# Patient Record
Sex: MALE | Race: OTHER | HISPANIC OR LATINO | URBAN - METROPOLITAN AREA
[De-identification: names, ages, dates, MRNs, and addresses within clinical notes are randomized per-mention and may not be internally consistent; named-entity substitution may affect disease eponyms.]

---

## 2023-12-30 ENCOUNTER — INPATIENT (INPATIENT)
Facility: HOSPITAL | Age: 78
LOS: 8 days | Discharge: HOME | DRG: 312 | End: 2024-01-08
Attending: INTERNAL MEDICINE | Admitting: INTERNAL MEDICINE
Payer: COMMERCIAL

## 2023-12-30 VITALS
SYSTOLIC BLOOD PRESSURE: 112 MMHG | TEMPERATURE: 99 F | DIASTOLIC BLOOD PRESSURE: 60 MMHG | RESPIRATION RATE: 20 BRPM | WEIGHT: 158.07 LBS | HEIGHT: 67 IN | HEART RATE: 67 BPM | OXYGEN SATURATION: 95 %

## 2023-12-30 DIAGNOSIS — E11.9 TYPE 2 DIABETES MELLITUS WITHOUT COMPLICATIONS: ICD-10-CM

## 2023-12-30 DIAGNOSIS — S22.39XA FRACTURE OF ONE RIB, UNSPECIFIED SIDE, INITIAL ENCOUNTER FOR CLOSED FRACTURE: ICD-10-CM

## 2023-12-30 DIAGNOSIS — I50.9 HEART FAILURE, UNSPECIFIED: ICD-10-CM

## 2023-12-30 DIAGNOSIS — Z29.9 ENCOUNTER FOR PROPHYLACTIC MEASURES, UNSPECIFIED: ICD-10-CM

## 2023-12-30 DIAGNOSIS — R55 SYNCOPE AND COLLAPSE: ICD-10-CM

## 2023-12-30 DIAGNOSIS — I21.4 NON-ST ELEVATION (NSTEMI) MYOCARDIAL INFARCTION: ICD-10-CM

## 2023-12-30 DIAGNOSIS — R79.89 OTHER SPECIFIED ABNORMAL FINDINGS OF BLOOD CHEMISTRY: ICD-10-CM

## 2023-12-30 DIAGNOSIS — R05.9 COUGH, UNSPECIFIED: ICD-10-CM

## 2023-12-30 DIAGNOSIS — J32.9 CHRONIC SINUSITIS, UNSPECIFIED: ICD-10-CM

## 2023-12-30 DIAGNOSIS — E78.5 HYPERLIPIDEMIA, UNSPECIFIED: ICD-10-CM

## 2023-12-30 DIAGNOSIS — I10 ESSENTIAL (PRIMARY) HYPERTENSION: ICD-10-CM

## 2023-12-30 LAB
ALBUMIN SERPL ELPH-MCNC: 3.3 G/DL — LOW (ref 3.5–5)
ALBUMIN SERPL ELPH-MCNC: 3.3 G/DL — LOW (ref 3.5–5)
ALP SERPL-CCNC: 48 U/L — SIGNIFICANT CHANGE UP (ref 40–120)
ALP SERPL-CCNC: 48 U/L — SIGNIFICANT CHANGE UP (ref 40–120)
ALT FLD-CCNC: 26 U/L DA — SIGNIFICANT CHANGE UP (ref 10–60)
ALT FLD-CCNC: 26 U/L DA — SIGNIFICANT CHANGE UP (ref 10–60)
ANION GAP SERPL CALC-SCNC: 6 MMOL/L — SIGNIFICANT CHANGE UP (ref 5–17)
ANION GAP SERPL CALC-SCNC: 6 MMOL/L — SIGNIFICANT CHANGE UP (ref 5–17)
APTT BLD: 53.4 SEC — HIGH (ref 24.5–35.6)
APTT BLD: 53.4 SEC — HIGH (ref 24.5–35.6)
AST SERPL-CCNC: 27 U/L — SIGNIFICANT CHANGE UP (ref 10–40)
AST SERPL-CCNC: 27 U/L — SIGNIFICANT CHANGE UP (ref 10–40)
BASOPHILS # BLD AUTO: 0.05 K/UL — SIGNIFICANT CHANGE UP (ref 0–0.2)
BASOPHILS # BLD AUTO: 0.05 K/UL — SIGNIFICANT CHANGE UP (ref 0–0.2)
BASOPHILS NFR BLD AUTO: 0.7 % — SIGNIFICANT CHANGE UP (ref 0–2)
BASOPHILS NFR BLD AUTO: 0.7 % — SIGNIFICANT CHANGE UP (ref 0–2)
BILIRUB SERPL-MCNC: 0.7 MG/DL — SIGNIFICANT CHANGE UP (ref 0.2–1.2)
BILIRUB SERPL-MCNC: 0.7 MG/DL — SIGNIFICANT CHANGE UP (ref 0.2–1.2)
BUN SERPL-MCNC: 19 MG/DL — HIGH (ref 7–18)
BUN SERPL-MCNC: 19 MG/DL — HIGH (ref 7–18)
CALCIUM SERPL-MCNC: 8.8 MG/DL — SIGNIFICANT CHANGE UP (ref 8.4–10.5)
CALCIUM SERPL-MCNC: 8.8 MG/DL — SIGNIFICANT CHANGE UP (ref 8.4–10.5)
CHLORIDE SERPL-SCNC: 100 MMOL/L — SIGNIFICANT CHANGE UP (ref 96–108)
CHLORIDE SERPL-SCNC: 100 MMOL/L — SIGNIFICANT CHANGE UP (ref 96–108)
CK SERPL-CCNC: 103 U/L — SIGNIFICANT CHANGE UP (ref 35–232)
CK SERPL-CCNC: 103 U/L — SIGNIFICANT CHANGE UP (ref 35–232)
CO2 SERPL-SCNC: 29 MMOL/L — SIGNIFICANT CHANGE UP (ref 22–31)
CO2 SERPL-SCNC: 29 MMOL/L — SIGNIFICANT CHANGE UP (ref 22–31)
CREAT SERPL-MCNC: 1.41 MG/DL — HIGH (ref 0.5–1.3)
CREAT SERPL-MCNC: 1.41 MG/DL — HIGH (ref 0.5–1.3)
EGFR: 52 ML/MIN/1.73M2 — LOW
EGFR: 52 ML/MIN/1.73M2 — LOW
EOSINOPHIL # BLD AUTO: 0.15 K/UL — SIGNIFICANT CHANGE UP (ref 0–0.5)
EOSINOPHIL # BLD AUTO: 0.15 K/UL — SIGNIFICANT CHANGE UP (ref 0–0.5)
EOSINOPHIL NFR BLD AUTO: 2 % — SIGNIFICANT CHANGE UP (ref 0–6)
EOSINOPHIL NFR BLD AUTO: 2 % — SIGNIFICANT CHANGE UP (ref 0–6)
GLUCOSE SERPL-MCNC: 120 MG/DL — HIGH (ref 70–99)
GLUCOSE SERPL-MCNC: 120 MG/DL — HIGH (ref 70–99)
HCT VFR BLD CALC: 46.2 % — SIGNIFICANT CHANGE UP (ref 39–50)
HCT VFR BLD CALC: 46.2 % — SIGNIFICANT CHANGE UP (ref 39–50)
HGB BLD-MCNC: 15.3 G/DL — SIGNIFICANT CHANGE UP (ref 13–17)
HGB BLD-MCNC: 15.3 G/DL — SIGNIFICANT CHANGE UP (ref 13–17)
IMM GRANULOCYTES NFR BLD AUTO: 0.4 % — SIGNIFICANT CHANGE UP (ref 0–0.9)
IMM GRANULOCYTES NFR BLD AUTO: 0.4 % — SIGNIFICANT CHANGE UP (ref 0–0.9)
INR BLD: 2.59 RATIO — HIGH (ref 0.85–1.18)
INR BLD: 2.59 RATIO — HIGH (ref 0.85–1.18)
LYMPHOCYTES # BLD AUTO: 2.16 K/UL — SIGNIFICANT CHANGE UP (ref 1–3.3)
LYMPHOCYTES # BLD AUTO: 2.16 K/UL — SIGNIFICANT CHANGE UP (ref 1–3.3)
LYMPHOCYTES # BLD AUTO: 28.7 % — SIGNIFICANT CHANGE UP (ref 13–44)
LYMPHOCYTES # BLD AUTO: 28.7 % — SIGNIFICANT CHANGE UP (ref 13–44)
MCHC RBC-ENTMCNC: 31.3 PG — SIGNIFICANT CHANGE UP (ref 27–34)
MCHC RBC-ENTMCNC: 31.3 PG — SIGNIFICANT CHANGE UP (ref 27–34)
MCHC RBC-ENTMCNC: 33.1 GM/DL — SIGNIFICANT CHANGE UP (ref 32–36)
MCHC RBC-ENTMCNC: 33.1 GM/DL — SIGNIFICANT CHANGE UP (ref 32–36)
MCV RBC AUTO: 94.5 FL — SIGNIFICANT CHANGE UP (ref 80–100)
MCV RBC AUTO: 94.5 FL — SIGNIFICANT CHANGE UP (ref 80–100)
MONOCYTES # BLD AUTO: 1.17 K/UL — HIGH (ref 0–0.9)
MONOCYTES # BLD AUTO: 1.17 K/UL — HIGH (ref 0–0.9)
MONOCYTES NFR BLD AUTO: 15.6 % — HIGH (ref 2–14)
MONOCYTES NFR BLD AUTO: 15.6 % — HIGH (ref 2–14)
NEUTROPHILS # BLD AUTO: 3.96 K/UL — SIGNIFICANT CHANGE UP (ref 1.8–7.4)
NEUTROPHILS # BLD AUTO: 3.96 K/UL — SIGNIFICANT CHANGE UP (ref 1.8–7.4)
NEUTROPHILS NFR BLD AUTO: 52.6 % — SIGNIFICANT CHANGE UP (ref 43–77)
NEUTROPHILS NFR BLD AUTO: 52.6 % — SIGNIFICANT CHANGE UP (ref 43–77)
NRBC # BLD: 0 /100 WBCS — SIGNIFICANT CHANGE UP (ref 0–0)
NRBC # BLD: 0 /100 WBCS — SIGNIFICANT CHANGE UP (ref 0–0)
NT-PROBNP SERPL-SCNC: 475 PG/ML — HIGH (ref 0–450)
NT-PROBNP SERPL-SCNC: 475 PG/ML — HIGH (ref 0–450)
PLATELET # BLD AUTO: 187 K/UL — SIGNIFICANT CHANGE UP (ref 150–400)
PLATELET # BLD AUTO: 187 K/UL — SIGNIFICANT CHANGE UP (ref 150–400)
POTASSIUM SERPL-MCNC: 4.1 MMOL/L — SIGNIFICANT CHANGE UP (ref 3.5–5.3)
POTASSIUM SERPL-MCNC: 4.1 MMOL/L — SIGNIFICANT CHANGE UP (ref 3.5–5.3)
POTASSIUM SERPL-SCNC: 4.1 MMOL/L — SIGNIFICANT CHANGE UP (ref 3.5–5.3)
POTASSIUM SERPL-SCNC: 4.1 MMOL/L — SIGNIFICANT CHANGE UP (ref 3.5–5.3)
PROT SERPL-MCNC: 7 G/DL — SIGNIFICANT CHANGE UP (ref 6–8.3)
PROT SERPL-MCNC: 7 G/DL — SIGNIFICANT CHANGE UP (ref 6–8.3)
PROTHROM AB SERPL-ACNC: 28.7 SEC — HIGH (ref 9.5–13)
PROTHROM AB SERPL-ACNC: 28.7 SEC — HIGH (ref 9.5–13)
RAPID RVP RESULT: SIGNIFICANT CHANGE UP
RAPID RVP RESULT: SIGNIFICANT CHANGE UP
RBC # BLD: 4.89 M/UL — SIGNIFICANT CHANGE UP (ref 4.2–5.8)
RBC # BLD: 4.89 M/UL — SIGNIFICANT CHANGE UP (ref 4.2–5.8)
RBC # FLD: 13.7 % — SIGNIFICANT CHANGE UP (ref 10.3–14.5)
RBC # FLD: 13.7 % — SIGNIFICANT CHANGE UP (ref 10.3–14.5)
SARS-COV-2 RNA SPEC QL NAA+PROBE: SIGNIFICANT CHANGE UP
SODIUM SERPL-SCNC: 135 MMOL/L — SIGNIFICANT CHANGE UP (ref 135–145)
SODIUM SERPL-SCNC: 135 MMOL/L — SIGNIFICANT CHANGE UP (ref 135–145)
TROPONIN I, HIGH SENSITIVITY RESULT: 368 NG/L — HIGH
TROPONIN I, HIGH SENSITIVITY RESULT: 368 NG/L — HIGH
TROPONIN I, HIGH SENSITIVITY RESULT: 406 NG/L — HIGH
TROPONIN I, HIGH SENSITIVITY RESULT: 406 NG/L — HIGH
WBC # BLD: 7.52 K/UL — SIGNIFICANT CHANGE UP (ref 3.8–10.5)
WBC # BLD: 7.52 K/UL — SIGNIFICANT CHANGE UP (ref 3.8–10.5)
WBC # FLD AUTO: 7.52 K/UL — SIGNIFICANT CHANGE UP (ref 3.8–10.5)
WBC # FLD AUTO: 7.52 K/UL — SIGNIFICANT CHANGE UP (ref 3.8–10.5)

## 2023-12-30 PROCEDURE — 70450 CT HEAD/BRAIN W/O DYE: CPT | Mod: 26

## 2023-12-30 PROCEDURE — 72125 CT NECK SPINE W/O DYE: CPT | Mod: 26

## 2023-12-30 PROCEDURE — 99285 EMERGENCY DEPT VISIT HI MDM: CPT

## 2023-12-30 PROCEDURE — 71250 CT THORAX DX C-: CPT | Mod: 26

## 2023-12-30 PROCEDURE — 71045 X-RAY EXAM CHEST 1 VIEW: CPT | Mod: 26

## 2023-12-30 RX ORDER — WARFARIN SODIUM 2.5 MG/1
6 TABLET ORAL DAILY
Refills: 0 | Status: COMPLETED | OUTPATIENT
Start: 2023-12-30 | End: 2024-01-02

## 2023-12-30 RX ORDER — IPRATROPIUM/ALBUTEROL SULFATE 18-103MCG
3 AEROSOL WITH ADAPTER (GRAM) INHALATION EVERY 6 HOURS
Refills: 0 | Status: DISCONTINUED | OUTPATIENT
Start: 2023-12-30 | End: 2024-01-03

## 2023-12-30 RX ORDER — DAPAGLIFLOZIN 10 MG/1
1 TABLET, FILM COATED ORAL
Refills: 0 | DISCHARGE

## 2023-12-30 RX ORDER — ACETAMINOPHEN 500 MG
650 TABLET ORAL EVERY 6 HOURS
Refills: 0 | Status: DISCONTINUED | OUTPATIENT
Start: 2023-12-30 | End: 2024-01-08

## 2023-12-30 RX ORDER — METOPROLOL TARTRATE 50 MG
12.5 TABLET ORAL DAILY
Refills: 0 | Status: DISCONTINUED | OUTPATIENT
Start: 2023-12-30 | End: 2024-01-01

## 2023-12-30 RX ORDER — OXYCODONE HYDROCHLORIDE 5 MG/1
5 TABLET ORAL EVERY 6 HOURS
Refills: 0 | Status: DISCONTINUED | OUTPATIENT
Start: 2023-12-30 | End: 2023-12-30

## 2023-12-30 RX ORDER — ATORVASTATIN CALCIUM 80 MG/1
20 TABLET, FILM COATED ORAL AT BEDTIME
Refills: 0 | Status: DISCONTINUED | OUTPATIENT
Start: 2023-12-30 | End: 2024-01-08

## 2023-12-30 RX ORDER — METOPROLOL TARTRATE 50 MG
1 TABLET ORAL
Refills: 0 | DISCHARGE

## 2023-12-30 RX ORDER — METFORMIN HYDROCHLORIDE 850 MG/1
1 TABLET ORAL
Refills: 0 | DISCHARGE

## 2023-12-30 RX ORDER — ATORVASTATIN CALCIUM 80 MG/1
1 TABLET, FILM COATED ORAL
Refills: 0 | DISCHARGE

## 2023-12-30 RX ORDER — SODIUM CHLORIDE 9 MG/ML
1000 INJECTION, SOLUTION INTRAVENOUS
Refills: 0 | Status: DISCONTINUED | OUTPATIENT
Start: 2023-12-30 | End: 2024-01-01

## 2023-12-30 RX ORDER — INSULIN LISPRO 100/ML
VIAL (ML) SUBCUTANEOUS
Refills: 0 | Status: DISCONTINUED | OUTPATIENT
Start: 2023-12-30 | End: 2024-01-08

## 2023-12-30 RX ORDER — FLUTICASONE PROPIONATE 50 MCG
1 SPRAY, SUSPENSION NASAL
Refills: 0 | Status: DISCONTINUED | OUTPATIENT
Start: 2023-12-30 | End: 2024-01-08

## 2023-12-30 RX ORDER — ONDANSETRON 8 MG/1
4 TABLET, FILM COATED ORAL EVERY 8 HOURS
Refills: 0 | Status: DISCONTINUED | OUTPATIENT
Start: 2023-12-30 | End: 2024-01-08

## 2023-12-30 RX ADMIN — ATORVASTATIN CALCIUM 20 MILLIGRAM(S): 80 TABLET, FILM COATED ORAL at 22:50

## 2023-12-30 RX ADMIN — Medication 650 MILLIGRAM(S): at 22:51

## 2023-12-30 RX ADMIN — Medication 650 MILLIGRAM(S): at 23:09

## 2023-12-30 NOTE — H&P ADULT - PROBLEM SELECTOR PLAN 7
C/w metoprolol. On Farxiga and metformin at home.   Hold oral antihypoglycemic.  Start sliding scale.   F/U A1C

## 2023-12-30 NOTE — ED PROVIDER NOTE - PHYSICAL EXAMINATION
Heart regular  abdomen soft nontender.  There are some rhonchorous breath sounds bilaterally.  No calf swelling no edema.  Right posterior lower rib tenderness to palpation.  No bruising appreciated no crepitus.  Awake alert oriented x 3 ambulatory steady gait.

## 2023-12-30 NOTE — H&P ADULT - ATTENDING COMMENTS
78-year-old male on Coumadin with pacemaker diabetes high blood pressure presents with 3 days of cough.  The cough is so severe that makes the patient dizzy.  Yesterday he was coughing so much that he fainted and woke up on the floor.  He is complaining of right posterior rib pain that he presumably sustained after injuring them on the floor.  He denies any chest pain or any shortness of breath.  No headache.  He is not sure whether he hit his head.Ángel reports that if there was any electric shock administered with the defibrillator that the patient's doctors would have been notified and there is been no report of any kind.    < from: CT Cervical Spine No Cont (12.30.23 @ 20:43) >    IMPRESSION:    CT HEAD: Moderate anterior periventricular white matter ischemia.    Mild   global atrophy.  Moderate mucosal thickening in the BILATERAL ethmoid   sinuses and secretions in the RIGHT sphenoid sinus.    CT cervical spine:   No vertebral fracture is recognized.  Mild to   moderate degenerative disc disease and spondylosis at C3-4 C6-7 with loss   of disc height and associated degenerative endplate changes. There is   narrowing of the RIGHT C6/7 neural foramen due to uncovertebral spurring.   Mild posterior osteophytic ridge/disccomplexes at C4-5 through C6-7   flatten the ventral thecal sac.Prominence of the aortic arch. See chest   CT for details.    < end of copied text >          < from: CT Chest No Cont (12.30.23 @ 20:43) >    IMPRESSION:    Evaluation of the thoracic organs/vasculature is limited without   intravenous contrast. Acute right-sided rib fractures. No obvious   pneumothorax.    Bibasilar atelectasis/scarring. Findings suggestive of mucus plugging at   the lung bases.      < end of copied text >          .    assessment   --- syncope pos 2nd to severe cough, r/o cns patho, troponinemia, r/o acs, right rib pain 2nd to fx, s/p fall, sinusitis, h/o htn, chf, ppm, dm, PAF     plan  --  adm to tele, aspirin, statin, robitussin, albuterol, flonase nasal spray, levaquin, 500mg daily, cont preadmit home meds, gi and dvt prophylaxis  cbc, bmp, mg, phos, lipid, tsh, ce q8 x3    orthostatic bp q shift    echo      cardio cons  neuro cons. 78-year-old male on Coumadin with pacemaker diabetes high blood pressure presents with 3 days of cough.  The cough is so severe that makes the patient dizzy.  Yesterday he was coughing so much that he fainted and woke up on the floor.  He is complaining of right posterior rib pain that he presumably sustained after injuring them on the floor.  He denies any chest pain or any shortness of breath.  No headache.  He is not sure whether he hit his head.Grandson reports that if there was any electric shock administered with the defibrillator that the patient's doctors would have been notified and there is been no report of any kind.    < from: CT Cervical Spine No Cont (12.30.23 @ 20:43) >    IMPRESSION:    CT HEAD: Moderate anterior periventricular white matter ischemia.    Mild   global atrophy.  Moderate mucosal thickening in the BILATERAL ethmoid   sinuses and secretions in the RIGHT sphenoid sinus.    CT cervical spine:   No vertebral fracture is recognized.  Mild to   moderate degenerative disc disease and spondylosis at C3-4 C6-7 with loss   of disc height and associated degenerative endplate changes. There is   narrowing of the RIGHT C6/7 neural foramen due to uncovertebral spurring.   Mild posterior osteophytic ridge/disccomplexes at C4-5 through C6-7   flatten the ventral thecal sac.Prominence of the aortic arch. See chest   CT for details.    < end of copied text >          < from: CT Chest No Cont (12.30.23 @ 20:43) >    IMPRESSION:    Evaluation of the thoracic organs/vasculature is limited without   intravenous contrast. Acute right-sided rib fractures. No obvious   pneumothorax.    Bibasilar atelectasis/scarring. Findings suggestive of mucus plugging at   the lung bases.      < end of copied text >          .    assessment   --- syncope pos 2nd to severe cough, r/o cns patho, troponinemia, r/o acs, right rib pain 2nd to fx, s/p fall, sinusitis, cindy, h/o htn, chf, ppm, dm, PAF     plan  --  adm to tele, aspirin, statin, robitussin, albuterol, flonase nasal spray, levaquin, 500mg daily, tylenol prn, lispro ss, hold outpt dm meds, cont preadmit home meds, gi and dvt prophylaxis  cbc, bmp, mg, phos, lipid, tsh, hgba1c, ce q8 x3    orthostatic bp q shift    echo      cardio cons  neuro cons.   phys tx eval    pulm cons

## 2023-12-30 NOTE — ED PROVIDER NOTE - OBJECTIVE STATEMENT
Gaby 78-year-old male on Coumadin with pacemaker diabetes high blood pressure presents with 3 days of cough.  The cough is so severe that makes the patient dizzy.  Yesterday he was coughing so much that he fainted and woke up on the floor.  He is complaining of right posterior rib pain that he presumably sustained after injuring them on the floor.  He denies any chest pain or any shortness of breath.  No headache.  He is not sure whether he hit his head.Ángel reports that if there was any electric shock administered with the defibrillator that the patient's doctors would have been notified and there is been no report of any kind.

## 2023-12-30 NOTE — ED PROVIDER NOTE - CLINICAL SUMMARY MEDICAL DECISION MAKING FREE TEXT BOX
Patient with syncope episode yesterday with fall on the floor.  Also 3 days of cough.  Labs reveal elevated troponin.  EKG unremarkable.  Will admit for cardiac workup.  CT pending to evaluate for intracranial injury and for rib injury.  Patient is overall well-appearing awake alert oriented x 3.

## 2023-12-30 NOTE — H&P ADULT - PROBLEM SELECTOR PLAN 4
No chest pain.   Trop [id number].   Follow until downtrends.   EKG - AV dual paced rhythm.   F/U echo.   Consulted cardio - Dr. Toure.

## 2023-12-30 NOTE — H&P ADULT - PROBLEM SELECTOR PLAN 5
Hold entresto, indpamide, in setting of BEVERLY.  F/U echo. C/w warfarin and metoprolol.   Daily INRs.

## 2023-12-30 NOTE — ED ADULT NURSE NOTE - OBJECTIVE STATEMENT
Patient presents to ED s/p syncopal episode last night , as per ems , pt was getting up from the chair and fell hitting lower back denies hitting head. Patient reports dizziness & dry cough x3days, denies SOB, chest pain, fever, NVD.

## 2023-12-30 NOTE — H&P ADULT - PROBLEM SELECTOR PLAN 3
CT chest: Evaluation of the thoracic organs/vasculature is limited without intravenous contrast. Acute right-sided rib fractures.   Tylenol - mild pain.   Oxycodone - moderate pain.   Lidocaine patch.

## 2023-12-30 NOTE — H&P ADULT - ASSESSMENT
Patient is a 77 y/o M with PMH of DM, HTN, HLD, PAF, CHF, PPM and defibrillator who presented after an episode of syncope. Patient also complains of severe cough for the past 4 days. Patient is being admitted for further management and work up of syncope.

## 2023-12-30 NOTE — ED PROVIDER NOTE - CARE PLAN
Principal Discharge DX:	NSTEMI (non-ST elevation myocardial infarction)  Secondary Diagnosis:	Syncope   1

## 2023-12-30 NOTE — H&P ADULT - HISTORY OF PRESENT ILLNESS
Patient is a 79 y/o M with PMH of DM, HTN, HLD, PAF, CHF, PPM and defibrillator who presented after an episode of syncope. Patient's grandson reports that patient has had severe cough for the past 4 days and every time he has a coughing 'fit' the patient feels lightheaded. He reports the patient has a lot of phlegm that he is bringing up while coughing. Patient denies any shortness of breath, fevers, or chills. Patient also denies any sick contacts. Patient's grandson reports that patient was coughing today while moving to his bed when he passed out. He reports that he felt lightheaded before he passed out and regained consciousness with in seconds. Patient denies hitting his head. Patient reports that once he got up he felt pain on his right chest. Patient denies any sick contacts. Patient denies any recent chest pain, palpitations, shortness of breath,, nausea, vomiting, abdominal pain, diarrhea, constipation, melena, hematochezia, dysuria, urinary frequency, or urgency. Patient denies any other complains at this time.

## 2023-12-30 NOTE — H&P ADULT - NSHPPHYSICALEXAM_GEN_ALL_CORE
PHYSICAL EXAM:  GENERAL: NAD, speaks in full sentences, no signs of respiratory distress  HEAD:  Atraumatic, Normocephalic  EYES: EOMI, PERRLA, conjunctiva and sclera clear  NECK: Supple  CHEST/LUNG: b/l end expiratory wheezing; No crackles; No accessory muscles used  HEART: Regular rate and rhythm; No murmurs;   ABDOMEN: Soft, Nontender, Nondistended; Bowel sounds present; No guarding  EXTREMITIES:  2+ Peripheral Pulses, No edema  PSYCH: AAOx3  NEUROLOGY: non-focal  SKIN: No rashes or lesions

## 2023-12-30 NOTE — H&P ADULT - PROBLEM SELECTOR PLAN 2
P/w cough and bilateral wheeze.   RVP negative.   CT HEAD: Moderate anterior periventricular white matter ischemia.    Mild global atrophy.  Moderate mucosal thickening in the BILATERAL ethmoid sinuses and secretions in the RIGHT sphenoid sinus.  CT chest: Evaluation of the thoracic organs/vasculature is limited without intravenous contrast. Acute right-sided rib fractures. No obvious pneumothorax. Bibasilar atelectasis/scarring. Findings suggestive of mucus plugging at the lung bases.   Start flonase.   Start levaquin.   Start mucinex.

## 2023-12-30 NOTE — ED ADULT NURSE REASSESSMENT NOTE - NS ED NURSE REASSESS COMMENT FT1
Patient resting comfortably in bed. Denies any pain. Denies dizziness, nausea vomiting. Will continue to monitor and reassess

## 2023-12-30 NOTE — ED ADULT NURSE NOTE - NSFALLRISKINTERV_ED_ALL_ED
Communicate fall risk and risk factors to all staff, patient, and family/Provide visual cue: yellow wristband, yellow gown, etc/Reinforce activity limits and safety measures with patient and family/Call bell, personal items and telephone in reach/Instruct patient to call for assistance before getting out of bed/chair/stretcher/Non-slip footwear applied when patient is off stretcher/[place] to call system/Physically safe environment - no spills, clutter or unnecessary equipment/Purposeful Proactive Rounding/Room/bathroom lighting operational, light cord in reach Communicate fall risk and risk factors to all staff, patient, and family/Provide visual cue: yellow wristband, yellow gown, etc/Reinforce activity limits and safety measures with patient and family/Call bell, personal items and telephone in reach/Instruct patient to call for assistance before getting out of bed/chair/stretcher/Non-slip footwear applied when patient is off stretcher/[place] to call system/Physically safe environment - no spills, clutter or unnecessary equipment/Purposeful Proactive Rounding/Room/bathroom lighting operational, light cord in reach

## 2023-12-30 NOTE — H&P ADULT - PROBLEM SELECTOR PLAN 1
P/w syncope with severe cough.   RVP negative.   CT HEAD: Moderate anterior periventricular white matter ischemia.    Mild global atrophy.  Moderate mucosal thickening in the BILATERAL ethmoid sinuses and secretions in the RIGHT sphenoid sinus.  Admit to tele.   F/U orthostatic vitals.   F/U echo.   Consulted cardiology - Dr. Toure.   Consulted neurology - Dr. Coffey.

## 2023-12-30 NOTE — H&P ADULT - PROBLEM SELECTOR PLAN 6
[facility] called because the order for the patients CT scan is not correct. Correct order pending but writer unable to fill in the information. Please advise. On Farxiga and metformin at home.   Hold oral antihypoglycemic.  Start sliding scale.   F/U A1C Hold entresto, indpamide, in setting of BEVERLY.  F/U echo.

## 2023-12-31 DIAGNOSIS — R05.9 COUGH, UNSPECIFIED: ICD-10-CM

## 2023-12-31 DIAGNOSIS — N17.9 ACUTE KIDNEY FAILURE, UNSPECIFIED: ICD-10-CM

## 2023-12-31 DIAGNOSIS — I48.91 UNSPECIFIED ATRIAL FIBRILLATION: ICD-10-CM

## 2023-12-31 LAB
A1C WITH ESTIMATED AVERAGE GLUCOSE RESULT: 6.2 % — HIGH (ref 4–5.6)
A1C WITH ESTIMATED AVERAGE GLUCOSE RESULT: 6.2 % — HIGH (ref 4–5.6)
ANION GAP SERPL CALC-SCNC: 6 MMOL/L — SIGNIFICANT CHANGE UP (ref 5–17)
ANION GAP SERPL CALC-SCNC: 6 MMOL/L — SIGNIFICANT CHANGE UP (ref 5–17)
BUN SERPL-MCNC: 25 MG/DL — HIGH (ref 7–18)
BUN SERPL-MCNC: 25 MG/DL — HIGH (ref 7–18)
CALCIUM SERPL-MCNC: 9.2 MG/DL — SIGNIFICANT CHANGE UP (ref 8.4–10.5)
CALCIUM SERPL-MCNC: 9.2 MG/DL — SIGNIFICANT CHANGE UP (ref 8.4–10.5)
CHLORIDE SERPL-SCNC: 99 MMOL/L — SIGNIFICANT CHANGE UP (ref 96–108)
CHLORIDE SERPL-SCNC: 99 MMOL/L — SIGNIFICANT CHANGE UP (ref 96–108)
CHOLEST SERPL-MCNC: 132 MG/DL — SIGNIFICANT CHANGE UP
CHOLEST SERPL-MCNC: 132 MG/DL — SIGNIFICANT CHANGE UP
CO2 SERPL-SCNC: 31 MMOL/L — SIGNIFICANT CHANGE UP (ref 22–31)
CO2 SERPL-SCNC: 31 MMOL/L — SIGNIFICANT CHANGE UP (ref 22–31)
CREAT SERPL-MCNC: 1.51 MG/DL — HIGH (ref 0.5–1.3)
CREAT SERPL-MCNC: 1.51 MG/DL — HIGH (ref 0.5–1.3)
EGFR: 48 ML/MIN/1.73M2 — LOW
EGFR: 48 ML/MIN/1.73M2 — LOW
ESTIMATED AVERAGE GLUCOSE: 131 MG/DL — HIGH (ref 68–114)
ESTIMATED AVERAGE GLUCOSE: 131 MG/DL — HIGH (ref 68–114)
GLUCOSE BLDC GLUCOMTR-MCNC: 101 MG/DL — HIGH (ref 70–99)
GLUCOSE BLDC GLUCOMTR-MCNC: 101 MG/DL — HIGH (ref 70–99)
GLUCOSE BLDC GLUCOMTR-MCNC: 112 MG/DL — HIGH (ref 70–99)
GLUCOSE BLDC GLUCOMTR-MCNC: 112 MG/DL — HIGH (ref 70–99)
GLUCOSE BLDC GLUCOMTR-MCNC: 77 MG/DL — SIGNIFICANT CHANGE UP (ref 70–99)
GLUCOSE BLDC GLUCOMTR-MCNC: 77 MG/DL — SIGNIFICANT CHANGE UP (ref 70–99)
GLUCOSE BLDC GLUCOMTR-MCNC: 79 MG/DL — SIGNIFICANT CHANGE UP (ref 70–99)
GLUCOSE BLDC GLUCOMTR-MCNC: 79 MG/DL — SIGNIFICANT CHANGE UP (ref 70–99)
GLUCOSE SERPL-MCNC: 94 MG/DL — SIGNIFICANT CHANGE UP (ref 70–99)
GLUCOSE SERPL-MCNC: 94 MG/DL — SIGNIFICANT CHANGE UP (ref 70–99)
HCT VFR BLD CALC: 45.9 % — SIGNIFICANT CHANGE UP (ref 39–50)
HCT VFR BLD CALC: 45.9 % — SIGNIFICANT CHANGE UP (ref 39–50)
HCV AB S/CO SERPL IA: 0.09 S/CO — SIGNIFICANT CHANGE UP (ref 0–0.99)
HCV AB S/CO SERPL IA: 0.09 S/CO — SIGNIFICANT CHANGE UP (ref 0–0.99)
HCV AB SERPL-IMP: SIGNIFICANT CHANGE UP
HCV AB SERPL-IMP: SIGNIFICANT CHANGE UP
HDLC SERPL-MCNC: 47 MG/DL — SIGNIFICANT CHANGE UP
HDLC SERPL-MCNC: 47 MG/DL — SIGNIFICANT CHANGE UP
HGB BLD-MCNC: 15 G/DL — SIGNIFICANT CHANGE UP (ref 13–17)
HGB BLD-MCNC: 15 G/DL — SIGNIFICANT CHANGE UP (ref 13–17)
INR BLD: 2.45 RATIO — HIGH (ref 0.85–1.18)
INR BLD: 2.45 RATIO — HIGH (ref 0.85–1.18)
LIPID PNL WITH DIRECT LDL SERPL: 59 MG/DL — SIGNIFICANT CHANGE UP
LIPID PNL WITH DIRECT LDL SERPL: 59 MG/DL — SIGNIFICANT CHANGE UP
MAGNESIUM SERPL-MCNC: 1.5 MG/DL — LOW (ref 1.6–2.6)
MAGNESIUM SERPL-MCNC: 1.5 MG/DL — LOW (ref 1.6–2.6)
MCHC RBC-ENTMCNC: 30.9 PG — SIGNIFICANT CHANGE UP (ref 27–34)
MCHC RBC-ENTMCNC: 30.9 PG — SIGNIFICANT CHANGE UP (ref 27–34)
MCHC RBC-ENTMCNC: 32.7 GM/DL — SIGNIFICANT CHANGE UP (ref 32–36)
MCHC RBC-ENTMCNC: 32.7 GM/DL — SIGNIFICANT CHANGE UP (ref 32–36)
MCV RBC AUTO: 94.6 FL — SIGNIFICANT CHANGE UP (ref 80–100)
MCV RBC AUTO: 94.6 FL — SIGNIFICANT CHANGE UP (ref 80–100)
NON HDL CHOLESTEROL: 85 MG/DL — SIGNIFICANT CHANGE UP
NON HDL CHOLESTEROL: 85 MG/DL — SIGNIFICANT CHANGE UP
NRBC # BLD: 0 /100 WBCS — SIGNIFICANT CHANGE UP (ref 0–0)
NRBC # BLD: 0 /100 WBCS — SIGNIFICANT CHANGE UP (ref 0–0)
PHOSPHATE SERPL-MCNC: 2.9 MG/DL — SIGNIFICANT CHANGE UP (ref 2.5–4.5)
PHOSPHATE SERPL-MCNC: 2.9 MG/DL — SIGNIFICANT CHANGE UP (ref 2.5–4.5)
PLATELET # BLD AUTO: 196 K/UL — SIGNIFICANT CHANGE UP (ref 150–400)
PLATELET # BLD AUTO: 196 K/UL — SIGNIFICANT CHANGE UP (ref 150–400)
POTASSIUM SERPL-MCNC: 3 MMOL/L — LOW (ref 3.5–5.3)
POTASSIUM SERPL-MCNC: 3 MMOL/L — LOW (ref 3.5–5.3)
POTASSIUM SERPL-SCNC: 3 MMOL/L — LOW (ref 3.5–5.3)
POTASSIUM SERPL-SCNC: 3 MMOL/L — LOW (ref 3.5–5.3)
PROTHROM AB SERPL-ACNC: 27.2 SEC — HIGH (ref 9.5–13)
PROTHROM AB SERPL-ACNC: 27.2 SEC — HIGH (ref 9.5–13)
RBC # BLD: 4.85 M/UL — SIGNIFICANT CHANGE UP (ref 4.2–5.8)
RBC # BLD: 4.85 M/UL — SIGNIFICANT CHANGE UP (ref 4.2–5.8)
RBC # FLD: 13.7 % — SIGNIFICANT CHANGE UP (ref 10.3–14.5)
RBC # FLD: 13.7 % — SIGNIFICANT CHANGE UP (ref 10.3–14.5)
SODIUM SERPL-SCNC: 136 MMOL/L — SIGNIFICANT CHANGE UP (ref 135–145)
SODIUM SERPL-SCNC: 136 MMOL/L — SIGNIFICANT CHANGE UP (ref 135–145)
TRIGL SERPL-MCNC: 130 MG/DL — SIGNIFICANT CHANGE UP
TRIGL SERPL-MCNC: 130 MG/DL — SIGNIFICANT CHANGE UP
TROPONIN I, HIGH SENSITIVITY RESULT: 377.7 NG/L — HIGH
TROPONIN I, HIGH SENSITIVITY RESULT: 377.7 NG/L — HIGH
TROPONIN I, HIGH SENSITIVITY RESULT: 407.7 NG/L — HIGH
TROPONIN I, HIGH SENSITIVITY RESULT: 407.7 NG/L — HIGH
WBC # BLD: 6.74 K/UL — SIGNIFICANT CHANGE UP (ref 3.8–10.5)
WBC # BLD: 6.74 K/UL — SIGNIFICANT CHANGE UP (ref 3.8–10.5)
WBC # FLD AUTO: 6.74 K/UL — SIGNIFICANT CHANGE UP (ref 3.8–10.5)
WBC # FLD AUTO: 6.74 K/UL — SIGNIFICANT CHANGE UP (ref 3.8–10.5)

## 2023-12-31 PROCEDURE — 99223 1ST HOSP IP/OBS HIGH 75: CPT

## 2023-12-31 RX ORDER — POTASSIUM CHLORIDE 20 MEQ
40 PACKET (EA) ORAL EVERY 4 HOURS
Refills: 0 | Status: COMPLETED | OUTPATIENT
Start: 2023-12-31 | End: 2023-12-31

## 2023-12-31 RX ORDER — MAGNESIUM SULFATE 500 MG/ML
1 VIAL (ML) INJECTION ONCE
Refills: 0 | Status: COMPLETED | OUTPATIENT
Start: 2023-12-31 | End: 2023-12-31

## 2023-12-31 RX ADMIN — ATORVASTATIN CALCIUM 20 MILLIGRAM(S): 80 TABLET, FILM COATED ORAL at 21:58

## 2023-12-31 RX ADMIN — Medication 100 GRAM(S): at 09:29

## 2023-12-31 RX ADMIN — Medication 3 MILLILITER(S): at 21:59

## 2023-12-31 RX ADMIN — Medication 40 MILLIEQUIVALENT(S): at 13:32

## 2023-12-31 RX ADMIN — Medication 40 MILLIEQUIVALENT(S): at 09:29

## 2023-12-31 RX ADMIN — Medication 12.5 MILLIGRAM(S): at 06:17

## 2023-12-31 RX ADMIN — Medication 1200 MILLIGRAM(S): at 17:45

## 2023-12-31 RX ADMIN — Medication 3 MILLILITER(S): at 10:48

## 2023-12-31 RX ADMIN — Medication 1 SPRAY(S): at 06:17

## 2023-12-31 RX ADMIN — Medication 3 MILLILITER(S): at 16:35

## 2023-12-31 RX ADMIN — Medication 1 SPRAY(S): at 17:45

## 2023-12-31 RX ADMIN — Medication 1200 MILLIGRAM(S): at 06:17

## 2023-12-31 RX ADMIN — WARFARIN SODIUM 6 MILLIGRAM(S): 2.5 TABLET ORAL at 21:58

## 2023-12-31 RX ADMIN — SODIUM CHLORIDE 70 MILLILITER(S): 9 INJECTION, SOLUTION INTRAVENOUS at 06:18

## 2023-12-31 NOTE — CONSULT NOTE ADULT - SUBJECTIVE AND OBJECTIVE BOX
***TEMPLATE ONLY***      Patient is a 78y old  Male who presents with a chief complaint of Syncope (31 Dec 2023 12:13)      HPI:  Patient is a 77 y/o M with PMH of DM, HTN, HLD, PAF, CHF, PPM and defibrillator who presented after an episode of syncope. Patient's grandson reports that patient has had severe cough for the past 4 days and every time he has a coughing 'fit' the patient feels lightheaded. He reports the patient has a lot of phlegm that he is bringing up while coughing. Patient denies any shortness of breath, fevers, or chills. Patient also denies any sick contacts. Patient's grandson reports that patient was coughing today while moving to his bed when he passed out. He reports that he felt lightheaded before he passed out and regained consciousness with in seconds. Patient denies hitting his head. Patient reports that once he got up he felt pain on his right chest. Patient denies any sick contacts. Patient denies any recent chest pain, palpitations, shortness of breath,, nausea, vomiting, abdominal pain, diarrhea, constipation, melena, hematochezia, dysuria, urinary frequency, or urgency. Patient denies any other complains at this time.   (30 Dec 2023 20:19)         Neurological Review of Systems:  No difficulty with language.  No vision loss or double vision.  No dizziness, vertigo or new hearing loss.  No difficulty with speech or swallowing.  No focal weakness.  No focal sensory changes.  No numbness or tingling in the bilateral lower extremities.  No difficulty with balance.  No difficulty with ambulation.        MEDICATIONS  (STANDING):  albuterol/ipratropium for Nebulization 3 milliLiter(s) Nebulizer every 6 hours  atorvastatin 20 milliGRAM(s) Oral at bedtime  fluticasone propionate 50 MICROgram(s)/spray Nasal Spray 1 Spray(s) Both Nostrils two times a day  guaiFENesin ER 1200 milliGRAM(s) Oral every 12 hours  insulin lispro (ADMELOG) corrective regimen sliding scale   SubCutaneous Before meals and at bedtime  lactated ringers. 1000 milliLiter(s) (70 mL/Hr) IV Continuous <Continuous>  levoFLOXacin  Tablet 500 milliGRAM(s) Oral every 24 hours  metoprolol tartrate 12.5 milliGRAM(s) Oral daily  warfarin 6 milliGRAM(s) Oral daily    MEDICATIONS  (PRN):  acetaminophen     Tablet .. 650 milliGRAM(s) Oral every 6 hours PRN Temp greater or equal to 38C (100.4F), Mild Pain (1 - 3)  ondansetron Injectable 4 milliGRAM(s) IV Push every 8 hours PRN Nausea and/or Vomiting  oxyCODONE    IR 5 milliGRAM(s) Oral every 6 hours PRN Moderate Pain (4 - 6)    Allergies    No Known Allergies    Intolerances      PAST MEDICAL & SURGICAL HISTORY:  Pacemaker      Diabetes mellitus      Hypertension      Hyperlipidemia        FAMILY HISTORY:    SOCIAL HISTORY: non smoker/ former smoker/ active smoker    Review of Systems:  Constitutional: No generalized weakness. No fevers or chills.                    Eyes, Ears, Mouth, Throat: No vision loss   Respiratory: No shortness of breath or cough.                                Cardiovascular: No chest pain or palpitations  Gastrointestinal: No nausea or vomiting.                                         Genitourinary: No urinary incontinence or burning on urination.  Musculoskeletal: No joint pain.                                                           Dermatologic: No rash.  Neurological: as per HPI                                                                      Psychiatric: No behavioral problems.  Endocrine: No known hypoglycemia.               Hematologic/Lymphatic: No easy bleeding.    O:  Vital Signs Last 24 Hrs  T(C): 36.9 (31 Dec 2023 15:41), Max: 37.1 (30 Dec 2023 23:51)  T(F): 98.4 (31 Dec 2023 15:41), Max: 98.7 (30 Dec 2023 23:51)  HR: 63 (31 Dec 2023 15:41) (61 - 67)  BP: 142/79 (31 Dec 2023 15:41) (102/58 - 145/70)  BP(mean): --  RR: 18 (31 Dec 2023 15:41) (18 - 18)  SpO2: 93% (31 Dec 2023 15:41) (90% - 95%)    Parameters below as of 31 Dec 2023 15:41  Patient On (Oxygen Delivery Method): room air        General Exam:   General appearance: No acute distress                 Cardiovascular: Pedal dorsalis pulses intact bilaterally    Mental Status: Orientated to self, date and place.  Attention intact.  No dysarthria, aphasia or neglect.  Knowledge intact.  Registration intact.  Short and long term memory grossly intact.      Cranial Nerves: CN I - not tested.  PERRL, EOMI, VFF, no nystagmus or diplopia.  No APD.  Fundi not visualized.  CN V1-3 intact to light touch and pinprick.  No facial asymmetry.  Hearing intact to finger rub bilaterally.  Tongue, uvula and palate midline.  Sternocleidomastoid and Trapezius intact bilaterally.    Motor:   Tone: normal.                  Strength intact throughout  No pronator drift bilaterally                      No dysmetria on finger-nose-finger or heel-shin-heel  No truncal ataxia.  No resting, postural or action tremor.  No myoclonus.    Sensation: intact to light touch, pinprick, vibration and proprioception    Deep Tendon Reflexes: 1+ bilateral biceps, triceps, brachioradialis, knee and ankle  Toes flexor bilaterally    Gait: normal and stable.  Rhomberg -jose.    Other:     LABS:                        15.0   6.74  )-----------( 196      ( 31 Dec 2023 05:45 )             45.9     12-31    136  |  99  |  25<H>  ----------------------------<  94  3.0<L>   |  31  |  1.51<H>    Ca    9.2      31 Dec 2023 05:45  Phos  2.9     12-31  Mg     1.5     12-31    TPro  7.0  /  Alb  3.3<L>  /  TBili  0.7  /  DBili  x   /  AST  27  /  ALT  26  /  AlkPhos  48  12-30    PT/INR - ( 31 Dec 2023 05:45 )   PT: 27.2 sec;   INR: 2.45 ratio         PTT - ( 30 Dec 2023 19:20 )  PTT:53.4 sec  Urinalysis Basic - ( 31 Dec 2023 05:45 )    Color: x / Appearance: x / SG: x / pH: x  Gluc: 94 mg/dL / Ketone: x  / Bili: x / Urobili: x   Blood: x / Protein: x / Nitrite: x   Leuk Esterase: x / RBC: x / WBC x   Sq Epi: x / Non Sq Epi: x / Bacteria: x          RADIOLOGY & ADDITIONAL STUDIES:    EKG:  tele:  TTE:  EEG: ***TEMPLATE ONLY***      Patient is a 78y old  Male who presents with a chief complaint of Syncope (31 Dec 2023 12:13)      HPI:  Patient is a 79 y/o M with PMH of DM, HTN, HLD, PAF, CHF, PPM and defibrillator who presented after an episode of syncope. Patient's grandson reports that patient has had severe cough for the past 4 days and every time he has a coughing 'fit' the patient feels lightheaded. He reports the patient has a lot of phlegm that he is bringing up while coughing. Patient denies any shortness of breath, fevers, or chills. Patient also denies any sick contacts. Patient's grandson reports that patient was coughing today while moving to his bed when he passed out. He reports that he felt lightheaded before he passed out and regained consciousness with in seconds. Patient denies hitting his head. Patient reports that once he got up he felt pain on his right chest. Patient denies any sick contacts. Patient denies any recent chest pain, palpitations, shortness of breath,, nausea, vomiting, abdominal pain, diarrhea, constipation, melena, hematochezia, dysuria, urinary frequency, or urgency. Patient denies any other complains at this time.   (30 Dec 2023 20:19)         Neurological Review of Systems:  No difficulty with language.  No vision loss or double vision.  No dizziness, vertigo or new hearing loss.  No difficulty with speech or swallowing.  No focal weakness.  No focal sensory changes.  No numbness or tingling in the bilateral lower extremities.  No difficulty with balance.  No difficulty with ambulation.        MEDICATIONS  (STANDING):  albuterol/ipratropium for Nebulization 3 milliLiter(s) Nebulizer every 6 hours  atorvastatin 20 milliGRAM(s) Oral at bedtime  fluticasone propionate 50 MICROgram(s)/spray Nasal Spray 1 Spray(s) Both Nostrils two times a day  guaiFENesin ER 1200 milliGRAM(s) Oral every 12 hours  insulin lispro (ADMELOG) corrective regimen sliding scale   SubCutaneous Before meals and at bedtime  lactated ringers. 1000 milliLiter(s) (70 mL/Hr) IV Continuous <Continuous>  levoFLOXacin  Tablet 500 milliGRAM(s) Oral every 24 hours  metoprolol tartrate 12.5 milliGRAM(s) Oral daily  warfarin 6 milliGRAM(s) Oral daily    MEDICATIONS  (PRN):  acetaminophen     Tablet .. 650 milliGRAM(s) Oral every 6 hours PRN Temp greater or equal to 38C (100.4F), Mild Pain (1 - 3)  ondansetron Injectable 4 milliGRAM(s) IV Push every 8 hours PRN Nausea and/or Vomiting  oxyCODONE    IR 5 milliGRAM(s) Oral every 6 hours PRN Moderate Pain (4 - 6)    Allergies    No Known Allergies    Intolerances      PAST MEDICAL & SURGICAL HISTORY:  Pacemaker      Diabetes mellitus      Hypertension      Hyperlipidemia        FAMILY HISTORY:    SOCIAL HISTORY: non smoker/ former smoker/ active smoker    Review of Systems:  Constitutional: No generalized weakness. No fevers or chills.                    Eyes, Ears, Mouth, Throat: No vision loss   Respiratory: No shortness of breath or cough.                                Cardiovascular: No chest pain or palpitations  Gastrointestinal: No nausea or vomiting.                                         Genitourinary: No urinary incontinence or burning on urination.  Musculoskeletal: No joint pain.                                                           Dermatologic: No rash.  Neurological: as per HPI                                                                      Psychiatric: No behavioral problems.  Endocrine: No known hypoglycemia.               Hematologic/Lymphatic: No easy bleeding.    O:  Vital Signs Last 24 Hrs  T(C): 36.9 (31 Dec 2023 15:41), Max: 37.1 (30 Dec 2023 23:51)  T(F): 98.4 (31 Dec 2023 15:41), Max: 98.7 (30 Dec 2023 23:51)  HR: 63 (31 Dec 2023 15:41) (61 - 67)  BP: 142/79 (31 Dec 2023 15:41) (102/58 - 145/70)  BP(mean): --  RR: 18 (31 Dec 2023 15:41) (18 - 18)  SpO2: 93% (31 Dec 2023 15:41) (90% - 95%)    Parameters below as of 31 Dec 2023 15:41  Patient On (Oxygen Delivery Method): room air        General Exam:   General appearance: No acute distress                 Cardiovascular: Pedal dorsalis pulses intact bilaterally    Mental Status: Orientated to self, date and place.  Attention intact.  No dysarthria, aphasia or neglect.  Knowledge intact.  Registration intact.  Short and long term memory grossly intact.      Cranial Nerves: CN I - not tested.  PERRL, EOMI, VFF, no nystagmus or diplopia.  No APD.  Fundi not visualized.  CN V1-3 intact to light touch and pinprick.  No facial asymmetry.  Hearing intact to finger rub bilaterally.  Tongue, uvula and palate midline.  Sternocleidomastoid and Trapezius intact bilaterally.    Motor:   Tone: normal.                  Strength intact throughout  No pronator drift bilaterally                      No dysmetria on finger-nose-finger or heel-shin-heel  No truncal ataxia.  No resting, postural or action tremor.  No myoclonus.    Sensation: intact to light touch, pinprick, vibration and proprioception    Deep Tendon Reflexes: 1+ bilateral biceps, triceps, brachioradialis, knee and ankle  Toes flexor bilaterally    Gait: normal and stable.  Rhomberg -jose.    Other:     LABS:                        15.0   6.74  )-----------( 196      ( 31 Dec 2023 05:45 )             45.9     12-31    136  |  99  |  25<H>  ----------------------------<  94  3.0<L>   |  31  |  1.51<H>    Ca    9.2      31 Dec 2023 05:45  Phos  2.9     12-31  Mg     1.5     12-31    TPro  7.0  /  Alb  3.3<L>  /  TBili  0.7  /  DBili  x   /  AST  27  /  ALT  26  /  AlkPhos  48  12-30    PT/INR - ( 31 Dec 2023 05:45 )   PT: 27.2 sec;   INR: 2.45 ratio         PTT - ( 30 Dec 2023 19:20 )  PTT:53.4 sec  Urinalysis Basic - ( 31 Dec 2023 05:45 )    Color: x / Appearance: x / SG: x / pH: x  Gluc: 94 mg/dL / Ketone: x  / Bili: x / Urobili: x   Blood: x / Protein: x / Nitrite: x   Leuk Esterase: x / RBC: x / WBC x   Sq Epi: x / Non Sq Epi: x / Bacteria: x          RADIOLOGY & ADDITIONAL STUDIES:    EKG:  tele:  TTE:  EEG:     Patient is a 78y old  Male who presents with a chief complaint of Syncope (31 Dec 2023 12:13)      HPI:  Patient is a 77 y/o M with PMH of DM, HTN, HLD, PAF, CHF, PPM and defibrillator who presented after an episode of syncope. Patient's grandson reports that patient has had severe cough for the past 4 days and every time he has a coughing 'fit' the patient feels lightheaded. He reports the patient has a lot of phlegm that he is bringing up while coughing. Patient denies any shortness of breath, fevers, or chills. Patient also denies any sick contacts. Patient's grandson reports that patient was coughing today while moving to his bed when he passed out. He reports that he felt lightheaded before he passed out and regained consciousness with in seconds. Patient denies hitting his head. Patient reports that once he got up he felt pain on his right chest. Patient denies any sick contacts. Patient denies any recent chest pain, palpitations, shortness of breath,, nausea, vomiting, abdominal pain, diarrhea, constipation, melena, hematochezia, dysuria, urinary frequency, or urgency. Patient denies any other complains at this time.   (30 Dec 2023 20:19)     Pt now fells back to baseline.    Neurological Review of Systems:  No difficulty with language.  No vision loss or double vision.  No vertigo or new hearing loss.  No difficulty with speech or swallowing.  No focal weakness.  No focal sensory changes.  No difficulty with balance.  No difficulty with ambulation.        MEDICATIONS  (STANDING):  albuterol/ipratropium for Nebulization 3 milliLiter(s) Nebulizer every 6 hours  atorvastatin 20 milliGRAM(s) Oral at bedtime  fluticasone propionate 50 MICROgram(s)/spray Nasal Spray 1 Spray(s) Both Nostrils two times a day  guaiFENesin ER 1200 milliGRAM(s) Oral every 12 hours  insulin lispro (ADMELOG) corrective regimen sliding scale   SubCutaneous Before meals and at bedtime  lactated ringers. 1000 milliLiter(s) (70 mL/Hr) IV Continuous <Continuous>  levoFLOXacin  Tablet 500 milliGRAM(s) Oral every 24 hours  metoprolol tartrate 12.5 milliGRAM(s) Oral daily  warfarin 6 milliGRAM(s) Oral daily    MEDICATIONS  (PRN):  acetaminophen     Tablet .. 650 milliGRAM(s) Oral every 6 hours PRN Temp greater or equal to 38C (100.4F), Mild Pain (1 - 3)  ondansetron Injectable 4 milliGRAM(s) IV Push every 8 hours PRN Nausea and/or Vomiting  oxyCODONE    IR 5 milliGRAM(s) Oral every 6 hours PRN Moderate Pain (4 - 6)    Allergies    No Known Allergies    Intolerances      PAST MEDICAL & SURGICAL HISTORY:  Pacemaker      Diabetes mellitus      Hypertension      Hyperlipidemia        FAMILY HISTORY: nc parents    SOCIAL HISTORY: non smoker  Review of Systems:  Constitutional: No fevers or chills.                    Eyes, Ears, Mouth, Throat: No vision loss   Respiratory: No cough.                                Cardiovascular: No chest pain  Gastrointestinal: No vomiting.                                         Genitourinary: No burning on urination.  Musculoskeletal: No joint pain.                                                           Dermatologic: No rash.  Neurological: as per HPI                                                                      Psychiatric: No behavioral problems.  Endocrine: No known hypoglycemia.               Hematologic/Lymphatic: No easy bleeding.    O:  Vital Signs Last 24 Hrs  T(C): 36.9 (31 Dec 2023 15:41), Max: 37.1 (30 Dec 2023 23:51)  T(F): 98.4 (31 Dec 2023 15:41), Max: 98.7 (30 Dec 2023 23:51)  HR: 63 (31 Dec 2023 15:41) (61 - 67)  BP: 142/79 (31 Dec 2023 15:41) (102/58 - 145/70)  BP(mean): --  RR: 18 (31 Dec 2023 15:41) (18 - 18)  SpO2: 93% (31 Dec 2023 15:41) (90% - 95%)    Parameters below as of 31 Dec 2023 15:41  Patient On (Oxygen Delivery Method): room air        General Exam:   General appearance: No acute distress                 Cardiovascular: Pedal dorsalis pulses intact bilaterally    Mental Status: Orientated to self, date and place.  Attention intact.  No dysarthria, aphasia or neglect.  Knowledge intact.  Registration intact.  Short and long term memory grossly intact.      Cranial Nerves: CN I - not tested.  PERRL, EOMI, VFF, no nystagmus or diplopia.  No APD.  Fundi not visualized.  CN V1-3 intact to light touch.  No facial asymmetry.  Hearing intact to finger rub bilaterally.  Tongue, uvula and palate midline.  Sternocleidomastoid and Trapezius intact bilaterally.    Motor:   Tone: normal.                  Strength intact throughout  No pronator drift bilaterally                      No dysmetria on finger-nose-finger or heel-shin-heel  No truncal ataxia.  No resting, postural or action tremor.  No myoclonus.    Sensation: intact to light touch    Deep Tendon Reflexes: 1+ bilateral biceps, triceps, brachioradialis, knee and ankle  Toes flexor bilaterally    Gait: normal and stable.      Other:     LABS:                        15.0   6.74  )-----------( 196      ( 31 Dec 2023 05:45 )             45.9     12-31    136  |  99  |  25<H>  ----------------------------<  94  3.0<L>   |  31  |  1.51<H>    Ca    9.2      31 Dec 2023 05:45  Phos  2.9     12-31  Mg     1.5     12-31    TPro  7.0  /  Alb  3.3<L>  /  TBili  0.7  /  DBili  x   /  AST  27  /  ALT  26  /  AlkPhos  48  12-30    PT/INR - ( 31 Dec 2023 05:45 )   PT: 27.2 sec;   INR: 2.45 ratio         PTT - ( 30 Dec 2023 19:20 )  PTT:53.4 sec  Urinalysis Basic - ( 31 Dec 2023 05:45 )    Color: x / Appearance: x / SG: x / pH: x  Gluc: 94 mg/dL / Ketone: x  / Bili: x / Urobili: x   Blood: x / Protein: x / Nitrite: x   Leuk Esterase: x / RBC: x / WBC x   Sq Epi: x / Non Sq Epi: x / Bacteria: x          RADIOLOGY & ADDITIONAL STUDIES:    EKG: < from: CT Head No Cont (12.30.23 @ 20:42) >  CT HEAD: Moderate anterior periventricular white matter ischemia.    Mild   global atrophy.  Moderate mucosal thickening in the BILATERAL ethmoid   sinuses and secretions in the RIGHT sphenoid sinus.    CT cervical spine:   No vertebral fracture is recognized.  Mild to   moderate degenerative disc disease and spondylosis at C3-4 C6-7 with loss   of disc height and associated degenerative endplate changes. There is   narrowing of the RIGHT C6/7 neural foramen due to uncovertebral spurring.   Mild posterior osteophytic ridge/disccomplexes at C4-5 through C6-7   flatten the ventral thecal sac.Prominence of the aortic arch. See chest   CT for details.    < end of copied text >    cards note appreciated

## 2023-12-31 NOTE — CONSULT NOTE ADULT - PROBLEM SELECTOR RECOMMENDATION 5
Accuchecks with reg insulin coverage  HBA1C Avoid nephrotoxins  monitor BMP  replace lytes  Renal eval

## 2023-12-31 NOTE — PROGRESS NOTE ADULT - SUBJECTIVE AND OBJECTIVE BOX
Patient is a 78y old  Male who presents with a chief complaint of   pt seen in icu [  ], reg med floor [   ], bed [  ], chair at bedside [   ], a+o x3 [  ], lethargic [  ],  nad [  ]    kong [  ], ngt [  ], peg [  ], et tube [  ], cent line [  ], picc line [  ]        Allergies    No Known Allergies        Vitals    T(F): 97.9 (12-31-23 @ 04:44), Max: 99.3 (12-30-23 @ 15:08)  HR: 67 (12-31-23 @ 04:44) (61 - 67)  BP: 102/58 (12-31-23 @ 04:44) (102/58 - 125/61)  RR: 18 (12-31-23 @ 04:44) (18 - 20)  SpO2: 90% (12-31-23 @ 04:44) (90% - 95%)  Wt(kg): --  CAPILLARY BLOOD GLUCOSE      POCT Blood Glucose.: 80 mg/dL (30 Dec 2023 16:33)      Labs                          15.0   6.74  )-----------( 196      ( 31 Dec 2023 05:45 )             45.9       12-30    135  |  100  |  19<H>  ----------------------------<  120<H>  4.1   |  29  |  1.41<H>    Ca    9.2      31 Dec 2023 05:45    TPro  7.0  /  Alb  3.3<L>  /  TBili  0.7  /  DBili  x   /  AST  27  /  ALT  26  /  AlkPhos  48  12-30      CARDIAC MARKERS ( 30 Dec 2023 17:50 )  x     / x     / 103 U/L / x     / x          Troponin I, High Sensitivity Result: 407.7 ng/L (12-31-23 @ 00:12)  Troponin I, High Sensitivity Result: 406.0 ng/L (12-30-23 @ 19:50)  Troponin I, High Sensitivity Result: 368.0 ng/L (12-30-23 @ 17:50)        Radiology Results      Meds    MEDICATIONS  (STANDING):  albuterol/ipratropium for Nebulization 3 milliLiter(s) Nebulizer every 6 hours  atorvastatin 20 milliGRAM(s) Oral at bedtime  fluticasone propionate 50 MICROgram(s)/spray Nasal Spray 1 Spray(s) Both Nostrils two times a day  guaiFENesin ER 1200 milliGRAM(s) Oral every 12 hours  insulin lispro (ADMELOG) corrective regimen sliding scale   SubCutaneous Before meals and at bedtime  lactated ringers. 1000 milliLiter(s) (70 mL/Hr) IV Continuous <Continuous>  levoFLOXacin  Tablet 500 milliGRAM(s) Oral every 24 hours  metoprolol tartrate 12.5 milliGRAM(s) Oral daily  warfarin 6 milliGRAM(s) Oral daily      MEDICATIONS  (PRN):  acetaminophen     Tablet .. 650 milliGRAM(s) Oral every 6 hours PRN Temp greater or equal to 38C (100.4F), Mild Pain (1 - 3)  ondansetron Injectable 4 milliGRAM(s) IV Push every 8 hours PRN Nausea and/or Vomiting  oxyCODONE    IR 5 milliGRAM(s) Oral every 6 hours PRN Moderate Pain (4 - 6)      Physical Exam    Neuro :  no focal deficits  Respiratory: CTA B/L  CV: RRR, S1S2, no murmurs,   Abdominal: Soft, NT, ND +BS,  Extremities: No edema, + peripheral pulses    ASSESSMENT    syncope pos 2nd to severe cough,   r/o cns patho,   troponinemia,   r/o acs,   right rib pain 2nd to fx,   s/p fall,   sinusitis,   h/o htn,   chf,   ppm,   dm,   PAF     Non-ST elevation myocardial infarction (NSTEMI)    Pacemaker    Diabetes mellitus    Hypertension    Hyperlipidemia        PLAN    adm to tele,   aspirin,   statin,   robitussin,   albuterol,   flonase nasal spray,   levaquin, 500mg daily,   cont preadmit home meds,   gi and dvt prophylaxis  cbc,   bmp,   mg,   phos,   lipid,   tsh,   ce q8 x3    orthostatic bp q shift    echo      cardio cons  neuro cons.     Patient is a 78y old  Male who presents with a chief complaint of cough, syncope    pt seen in ed tele [ x ], reg med floor [   ], bed [ x ], chair at bedside [   ], awake and responsive [ x ], lethargic [  ],  nad [ x ]      Allergies    No Known Allergies        Vitals    T(F): 97.9 (12-31-23 @ 04:44), Max: 99.3 (12-30-23 @ 15:08)  HR: 67 (12-31-23 @ 04:44) (61 - 67)  BP: 102/58 (12-31-23 @ 04:44) (102/58 - 125/61)  RR: 18 (12-31-23 @ 04:44) (18 - 20)  SpO2: 90% (12-31-23 @ 04:44) (90% - 95%)  Wt(kg): --  CAPILLARY BLOOD GLUCOSE      POCT Blood Glucose.: 80 mg/dL (30 Dec 2023 16:33)      Labs                          15.0   6.74  )-----------( 196      ( 31 Dec 2023 05:45 )             45.9       12-30    135  |  100  |  19<H>  ----------------------------<  120<H>  4.1   |  29  |  1.41<H>    Ca    9.2      31 Dec 2023 05:45    TPro  7.0  /  Alb  3.3<L>  /  TBili  0.7  /  DBili  x   /  AST  27  /  ALT  26  /  AlkPhos  48  12-30    Prothrombin Time and INR, Plasma in AM (12.31.23 @ 05:45)   Prothrombin Time, Plasma: 27.2 sec  INR: 2.45:    CARDIAC MARKERS ( 30 Dec 2023 17:50 )  x     / x     / 103 U/L / x     / x          Troponin I, High Sensitivity Result: 407.7 ng/L (12-31-23 @ 00:12)  Troponin I, High Sensitivity Result: 406.0 ng/L (12-30-23 @ 19:50)  Troponin I, High Sensitivity Result: 368.0 ng/L (12-30-23 @ 17:50)        Radiology Results      Meds    MEDICATIONS  (STANDING):  albuterol/ipratropium for Nebulization 3 milliLiter(s) Nebulizer every 6 hours  atorvastatin 20 milliGRAM(s) Oral at bedtime  fluticasone propionate 50 MICROgram(s)/spray Nasal Spray 1 Spray(s) Both Nostrils two times a day  guaiFENesin ER 1200 milliGRAM(s) Oral every 12 hours  insulin lispro (ADMELOG) corrective regimen sliding scale   SubCutaneous Before meals and at bedtime  lactated ringers. 1000 milliLiter(s) (70 mL/Hr) IV Continuous <Continuous>  levoFLOXacin  Tablet 500 milliGRAM(s) Oral every 24 hours  metoprolol tartrate 12.5 milliGRAM(s) Oral daily  warfarin 6 milliGRAM(s) Oral daily      MEDICATIONS  (PRN):  acetaminophen     Tablet .. 650 milliGRAM(s) Oral every 6 hours PRN Temp greater or equal to 38C (100.4F), Mild Pain (1 - 3)  ondansetron Injectable 4 milliGRAM(s) IV Push every 8 hours PRN Nausea and/or Vomiting  oxyCODONE    IR 5 milliGRAM(s) Oral every 6 hours PRN Moderate Pain (4 - 6)      Physical Exam    Neuro :  no focal deficits  Respiratory: CTA B/L  CV: RRR, S1S2, no murmurs,   Abdominal: Soft, NT, ND +BS,  Extremities: No edema, + peripheral pulses    ASSESSMENT    syncope pos 2nd to severe cough,   r/o cns patho,   troponinemia,   r/o acs,   right rib pain 2nd to fx,   s/p fall,   sinusitis,   cindy   h/o htn,   chf,   ppm,   dm,   PAF         PLAN    cont tele,   aspirin, statin,   neuro cons   othrostatic bp q shift   trop x3 positive noted above   cont coumadin   inr therapeutic noted above   entresto on hold 2nd to cindy    cardio cons  f/u echo   pulm cons   robitussin,   albuterol,   flonase nasal spray,   levaquin, 500mg daily,   gentle hydration   lispro ss,   f/u [id number]  phys tx eval  cont current meds

## 2023-12-31 NOTE — CONSULT NOTE ADULT - ASSESSMENT
+[facility] Syncope in patient with dizziness and +[facility] cw orthostatic syncope.  Will recommend IV fluids as per primary team.  Please call back with questions.    benny NP

## 2023-12-31 NOTE — CONSULT NOTE ADULT - ASSESSMENT
77 y/o M with PMH of DM, HTN, HLD, PAF, CHF, S/P defibrillator,S/P CABG and AVR  who presented after an episode of syncope,acute lt sided rib fx,sinusitis,BEVERLY.  1.Tele monitoring.  2.Interrogate AICD.  3.Echocardiogram.  4.on abx for sinusistis-interx with coumadin, monitor INR daily and adjust coumadin.  5.PAF,?mech AVR-coumadin.  6.Replace k+.  7.CHF-b blocker.Entresto on hold.  8.CAD-statin,lopressor.  9.?BEVERLY-gentle hydration.Entresto on hold. 79 y/o M with PMH of DM, HTN, HLD, PAF, CHF, S/P defibrillator,S/P CABG and AVR  who presented after an episode of syncope,acute lt sided rib fx,sinusitis,BEVERLY.  1.Tele monitoring.  2.Interrogate AICD.  3.Echocardiogram.  4.on abx for sinusistis-interx with coumadin, monitor INR daily and adjust coumadin.  5.PAF,?mech AVR-coumadin.  6.Replace k+.  7.CHF-b blocker.Entresto on hold.  8.CAD-statin,lopressor.  9.?BEVERLY-gentle hydration.Entresto on hold.

## 2023-12-31 NOTE — PATIENT PROFILE ADULT - NSPRESCRALCAMT_GEN_A_NUR
Progress Notes by Viry Cali at 09/04/18 09:50 AM     Author:  Viry Cali Service:  (none) Author Type:  Patient      Filed:  09/04/18 09:50 AM Encounter Date:  8/29/2018 Status:  Signed     :  Viry Cali (Patient )            Newt Fall, Please advise on patient having 2 orders and how to proceed with scheduling.[MG1.1M]       Revision History        User Key Date/Time User Provider Type Action    > MG1.1 09/04/18 09:50 AM Viry Cali Patient  Sign    M - Manual 1 or 2

## 2023-12-31 NOTE — CONSULT NOTE ADULT - SUBJECTIVE AND OBJECTIVE BOX
Date of Service  12-31-23 @ 12:14    CHIEF COMPLAINT:Patient is a 78y old  Male who presents with a chief complaint of Syncope (31 Dec 2023 09:57)      HPI:  Patient is a 77 y/o M with PMH of DM, HTN, HLD, PAF, CHF, S/P defibrillator,S/P CABG and AVR  who presented after an episode of syncope. Patient's grandson reports that patient has had severe cough for the past 4 days and every time he has a coughing 'fit' the patient feels lightheaded. He reports the patient has a lot of phlegm that he is bringing up while coughing. Patient denies any shortness of breath, fevers, or chills. Patient also denies any sick contacts. Patient's grandson reports that patient was coughing today while moving to his bed when he passed out. He reports that he felt lightheaded before he passed out and regained consciousness with in seconds. Patient denies hitting his head. Patient reports that once he got up he felt pain on his right chest. Patient denies any sick contacts. Patient denies any recent chest pain, palpitations, shortness of breath,, nausea, vomiting, abdominal pain, diarrhea, constipation, melena, hematochezia, dysuria, urinary frequency, or urgency. Patient denies any other complains at this time.   (30 Dec 2023 20:19)      PAST MEDICAL & SURGICAL HISTORY:  Pacemaker      Diabetes mellitus      Hypertension      Hyperlipidemia      s/p CABG and AVR        MEDICATIONS  (STANDING):  albuterol/ipratropium for Nebulization 3 milliLiter(s) Nebulizer every 6 hours  atorvastatin 20 milliGRAM(s) Oral at bedtime  fluticasone propionate 50 MICROgram(s)/spray Nasal Spray 1 Spray(s) Both Nostrils two times a day  guaiFENesin ER 1200 milliGRAM(s) Oral every 12 hours  insulin lispro (ADMELOG) corrective regimen sliding scale   SubCutaneous Before meals and at bedtime  lactated ringers. 1000 milliLiter(s) (70 mL/Hr) IV Continuous <Continuous>  levoFLOXacin  Tablet 500 milliGRAM(s) Oral every 24 hours  metoprolol tartrate 12.5 milliGRAM(s) Oral daily  potassium chloride   Powder 40 milliEquivalent(s) Oral every 4 hours  warfarin 6 milliGRAM(s) Oral daily    MEDICATIONS  (PRN):  acetaminophen     Tablet .. 650 milliGRAM(s) Oral every 6 hours PRN Temp greater or equal to 38C (100.4F), Mild Pain (1 - 3)  ondansetron Injectable 4 milliGRAM(s) IV Push every 8 hours PRN Nausea and/or Vomiting  oxyCODONE    IR 5 milliGRAM(s) Oral every 6 hours PRN Moderate Pain (4 - 6)      FAMILY HISTORY:No hx of CAD      SOCIAL HISTORY:    [ x] Non-smoker    [x ] Alcohol-denies    Allergies    No Known Allergies    Intolerances    	    REVIEW OF SYSTEMS:  CONSTITUTIONAL: No fever, weight loss, or fatigue  EYES: No eye pain, visual disturbances, or discharge  ENT:  No difficulty hearing, tinnitus, vertigo; No sinus or throat pain  NECK: No pain or stiffness  RESPIRATORY: No cough, wheezing, chills or hemoptysis; No Shortness of Breath  CARDIOVASCULAR: No chest pain, palpitations,+ passing out,+ dizziness, No leg swelling  GASTROINTESTINAL: No abdominal or epigastric pain. No nausea, vomiting, or hematemesis; No diarrhea or constipation. No melena or hematochezia.  GENITOURINARY: No dysuria, frequency, hematuria, or incontinence  NEUROLOGICAL: No headaches, memory loss, loss of strength, numbness, or tremors  SKIN: No itching, burning, rashes, or lesions   LYMPH Nodes: No enlarged glands  ENDOCRINE: No heat or cold intolerance; No hair loss  MUSCULOSKELETAL: No joint pain or swelling; No muscle, back, or extremity pain  PSYCHIATRIC: No depression, anxiety, mood swings, or difficulty sleeping  HEME/LYMPH: No easy bruising, or bleeding gums  ALLERGY AND IMMUNOLOGIC: No hives or eczema	      PHYSICAL EXAM:  T(C): 36.8 (12-31-23 @ 11:43), Max: 37.4 (12-30-23 @ 15:08)  HR: 61 (12-31-23 @ 11:43) (61 - 67)  BP: 145/70 (12-31-23 @ 11:43) (102/58 - 145/70)  RR: 18 (12-31-23 @ 11:43) (18 - 20)  SpO2: 95% (12-31-23 @ 11:43) (90% - 95%)  Wt(kg): --  I&O's Summary      Appearance: Normal	  HEENT:   Normal oral mucosa, PERRL, EOMI	  Lymphatic: No lymphadenopathy  Cardiovascular: Normal S1 S2, No JVD, No murmurs,+ click  Respiratory: Lungs clear to auscultation	  Psychiatry: A & O x 3, Mood & affect appropriate  Gastrointestinal:  Soft, Non-tender, + BS	  Skin: No rashes, No ecchymoses, No cyanosis	  Neurologic: Non-focal  Extremities: Normal range of motion, No clubbing, cyanosis or edema  Vascular: Peripheral pulses palpable 2+ bilaterally    ECG:  	av paced   	  	  LABS:	 	      CARDIAC MARKERS ( 30 Dec 2023 17:50 )  x     / x     / 103 U/L / x     / x          Troponin I, High Sensitivity Result: 377.7 ng/L (12-31-23 @ 05:45)  Troponin I, High Sensitivity Result: 407.7 ng/L (12-31-23 @ 00:12)  Troponin I, High Sensitivity Result: 406.0 ng/L (12-30-23 @ 19:50)  Troponin I, High Sensitivity Result: 368.0 ng/L (12-30-23 @ 17:50)                          15.0   6.74  )-----------( 196      ( 31 Dec 2023 05:45 )             45.9     12-31    136  |  99  |  25<H>  ----------------------------<  94  3.0<L>   |  31  |  1.51<H>    Ca    9.2      31 Dec 2023 05:45  Phos  2.9     12-31  Mg     1.5     12-31    TPro  7.0  /  Alb  3.3<L>  /  TBili  0.7  /  DBili  x   /  AST  27  /  ALT  26  /  AlkPhos  48  12-30    proBNP:   Lipid Profile: Cholesterol 132  LDL --  HDL 47    ldl calc 59  Ratio --      PT/INR - ( 31 Dec 2023 05:45 )   PT: 27.2 sec;   INR: 2.45 ratio         PTT - ( 30 Dec 2023 19:20 )  PTT:53.4 sec      < from: CT Chest No Cont (12.30.23 @ 20:43) >  ACC: [id number] EXAM:  CT CHEST   ORDERED BY: LOY MENDEZ     PROCEDURE DATE:  12/30/2023          INTERPRETATION:  CLINICAL INDICATION: R posterior rib pain fall syncope   yesterday    PROCEDURE: CT of the chest was performed without intravenous contrast.   Coronal and sagittal reconstruction images were obtained.    CONTRAST/COMPLICATIONS:  IV Contrast: NONE  Oral Contrast: NONE  Complications: None reported at time of study completion    COMPARISON: None.    FINDINGS: Evaluation ofthe thoracic organs/vasculature is limited   without intravenous contrast. Artifact from patient's arms and   respiratory motion degrades images.    LUNGS AND AIRWAYS: Patent central airways. Bronchial thickening with   areas of narrowing at the lungbases, which may be due to mucus plugging.   Linear opacities at the left > right lung base, suggesting   atelectasis/scarring.  PLEURA: No pleural effusion or pneumothorax.  HEART: Normal size. No pericardial effusion. Mitral annular and coronary   desiccation. Aortic valve replacement.  VESSELS: Atherosclerosis. Ectatic distal ascending aorta/ascending aortic   arch (4.0 cm). CABG.  MEDIASTINUM AND YARELI: Subcentimeter lymph nodes.  CHEST WALL AND LOWER NECK: Left-sided AICD/pacemaker. Median sternotomy.  UPPER ABDOMEN: Fatty atrophy of the visualized pancreas. Bilateral renal   cysts. Bilateral perinephric stranding. Nonobstructing 0.2 cm right renal   stone. Colon diverticulosis. Partially visualized visualized duodenal   diverticulum.  BONES: Acute fractures of the right sixth through ninth anterior ribs   (nondisplaced right sixth and seventh and minimally displaced right   eighth and ninth ribs). Corticated, slight deformities of additional   bilateral ribs, suggesting chronic. Degenerative changes of the spine.    IMPRESSION:    Evaluation of the thoracic organs/vasculature is limited without   intravenous contrast. Acute right-sided rib fractures. No obvious   pneumothorax.    Bibasilar atelectasis/scarring. Findings suggestive of mucus plugging at   the lung bases.    Additional findings as described.    --- End of Report ---            KIP PARKS MD; Attending Radiologist  This document has been electronically signed. Dec 30 2023  9:15PM    < end of copied text >  < from: CT Head No Cont (12.30.23 @ 20:42) >  ACC: [id number] EXAM:  CT CERVICAL SPINE   ORDERED BY: LOY MENDEZ     ACC: [id number] EXAM:  CT BRAIN   ORDERED BY: LOY MENDEZ     PROCEDURE DATE:  12/30/2023          INTERPRETATION:  CT head without IV contrast    CLINICALINFORMATION:  Fall   Intracranial hemorrhage.    TECHNIQUE: Contiguous axial 5 mm sections were obtained through the head.   Sagittal and coronal 2-D reformatted images were also obtained.   This   scan was performed using automatic exposure control (radiation dose   reduction software) to obtain a diagnostic image quality scan with   patient dose as low as reasonably achievable.    FINDINGS:   No previous examinations are available for review.    The brain demonstrates moderate anterior periventricular white matter   ischemia.   No acute cerebral cortical infarct is seen.  No intracranial   hemorrhage is found.  No mass effect is found in the brain.    The ventricles, sulci and basal cisterns show mild global atrophy.    The orbits are unremarkable.  The paranasal sinuses are significant for   moderate mucosal thickening in the BILATERAL ethmoid sinuses and   secretions in the RIGHT sphenoid sinus.  The nasal cavity appears intact.    The nasopharynx is symmetric.  The central skull base, petrous temporal   bones and calvarium remain intact.        CT cervical spine without IV contrast    CLINICAL INFORMATION: Fracture, trauma, neck pain.  Neck pain, spinal   stenosis, spondylosis. fall syncope    TECHNIQUE:  Contiguous axial 2.0 mm sections were obtained through the   cervical spine using a single helical acquisition.  Additional 2 mm   sagittal and coronal reformatted reconstructions of the spine were   obtained.  These additional reformatted images were used to evaluate the   spine for alignment, vertebral fractures and the integrity of the the   posterior elements.   This scan was performed using automatic exposure   control (radiation dose reduction software) to obtain a diagnostic image   quality scan with patient dose as low as reasonably achievable.    FINDINGS:   No prior similar studies are available for review    Cervical vertebral body heights are maintained. No vertebral fracture is   seen. No destructive bone lesion is found.  Alignment is significant for   straightening on a degenerative basis.  Facet joints appear intact and   aligned.    Cervical intervertebral disc spaces show mild to moderate degenerative   disc disease and spondylosis at C3-4 C6-7 with loss of disc height and   associated degenerative endplate changes. There is narrowing of the RIGHT   C6/7 neural foramen due to uncovertebral spurring. Mild posterior   osteophytic ridge/disc complexes at C4-5 through C6-7 flatten the ventral   thecal sac.    The skull base appears intact.  No neck mass is recognized.  Paraspinal   soft tissues appear intact. Visualized lymph nodes appear to be within   physiologic size limits.  Prominence of the aortic arch. See chest CT for   details.          IMPRESSION:    CT HEAD: Moderate anterior periventricular white matter ischemia.    Mild   global atrophy.  Moderate mucosal thickening in the BILATERAL ethmoid   sinuses and secretions in the RIGHT sphenoid sinus.    CT cervical spine:   No vertebral fracture is recognized.  Mild to   moderate degenerative disc disease and spondylosis at C3-4 C6-7 with loss   of disc height and associated degenerative endplate changes. There is   narrowing of the RIGHT C6/7 neural foramen due to uncovertebral spurring.   Mild posterior osteophytic ridge/disccomplexes at C4-5 through C6-7   flatten the ventral thecal sac.Prominence of the aortic arch. See chest   CT for details.    --- End of Report ---            MARK MAHONEY MD; Attending Radiologist  This document has been electronically signed. Dec30 2023  8:52PM       Date of Service  12-31-23 @ 12:14    CHIEF COMPLAINT:Patient is a 78y old  Male who presents with a chief complaint of Syncope (31 Dec 2023 09:57)      HPI:  Patient is a 77 y/o M with PMH of DM, HTN, HLD, PAF, CHF, S/P defibrillator,S/P CABG and AVR  who presented after an episode of syncope. Patient's grandson reports that patient has had severe cough for the past 4 days and every time he has a coughing 'fit' the patient feels lightheaded. He reports the patient has a lot of phlegm that he is bringing up while coughing. Patient denies any shortness of breath, fevers, or chills. Patient also denies any sick contacts. Patient's grandson reports that patient was coughing today while moving to his bed when he passed out. He reports that he felt lightheaded before he passed out and regained consciousness with in seconds. Patient denies hitting his head. Patient reports that once he got up he felt pain on his right chest. Patient denies any sick contacts. Patient denies any recent chest pain, palpitations, shortness of breath,, nausea, vomiting, abdominal pain, diarrhea, constipation, melena, hematochezia, dysuria, urinary frequency, or urgency. Patient denies any other complains at this time.   (30 Dec 2023 20:19)      PAST MEDICAL & SURGICAL HISTORY:  Pacemaker      Diabetes mellitus      Hypertension      Hyperlipidemia      s/p CABG and AVR        MEDICATIONS  (STANDING):  albuterol/ipratropium for Nebulization 3 milliLiter(s) Nebulizer every 6 hours  atorvastatin 20 milliGRAM(s) Oral at bedtime  fluticasone propionate 50 MICROgram(s)/spray Nasal Spray 1 Spray(s) Both Nostrils two times a day  guaiFENesin ER 1200 milliGRAM(s) Oral every 12 hours  insulin lispro (ADMELOG) corrective regimen sliding scale   SubCutaneous Before meals and at bedtime  lactated ringers. 1000 milliLiter(s) (70 mL/Hr) IV Continuous <Continuous>  levoFLOXacin  Tablet 500 milliGRAM(s) Oral every 24 hours  metoprolol tartrate 12.5 milliGRAM(s) Oral daily  potassium chloride   Powder 40 milliEquivalent(s) Oral every 4 hours  warfarin 6 milliGRAM(s) Oral daily    MEDICATIONS  (PRN):  acetaminophen     Tablet .. 650 milliGRAM(s) Oral every 6 hours PRN Temp greater or equal to 38C (100.4F), Mild Pain (1 - 3)  ondansetron Injectable 4 milliGRAM(s) IV Push every 8 hours PRN Nausea and/or Vomiting  oxyCODONE    IR 5 milliGRAM(s) Oral every 6 hours PRN Moderate Pain (4 - 6)      FAMILY HISTORY:No hx of CAD      SOCIAL HISTORY:    [ x] Non-smoker    [x ] Alcohol-denies    Allergies    No Known Allergies    Intolerances    	    REVIEW OF SYSTEMS:  CONSTITUTIONAL: No fever, weight loss, or fatigue  EYES: No eye pain, visual disturbances, or discharge  ENT:  No difficulty hearing, tinnitus, vertigo; No sinus or throat pain  NECK: No pain or stiffness  RESPIRATORY: No cough, wheezing, chills or hemoptysis; No Shortness of Breath  CARDIOVASCULAR: No chest pain, palpitations,+ passing out,+ dizziness, No leg swelling  GASTROINTESTINAL: No abdominal or epigastric pain. No nausea, vomiting, or hematemesis; No diarrhea or constipation. No melena or hematochezia.  GENITOURINARY: No dysuria, frequency, hematuria, or incontinence  NEUROLOGICAL: No headaches, memory loss, loss of strength, numbness, or tremors  SKIN: No itching, burning, rashes, or lesions   LYMPH Nodes: No enlarged glands  ENDOCRINE: No heat or cold intolerance; No hair loss  MUSCULOSKELETAL: No joint pain or swelling; No muscle, back, or extremity pain  PSYCHIATRIC: No depression, anxiety, mood swings, or difficulty sleeping  HEME/LYMPH: No easy bruising, or bleeding gums  ALLERGY AND IMMUNOLOGIC: No hives or eczema	      PHYSICAL EXAM:  T(C): 36.8 (12-31-23 @ 11:43), Max: 37.4 (12-30-23 @ 15:08)  HR: 61 (12-31-23 @ 11:43) (61 - 67)  BP: 145/70 (12-31-23 @ 11:43) (102/58 - 145/70)  RR: 18 (12-31-23 @ 11:43) (18 - 20)  SpO2: 95% (12-31-23 @ 11:43) (90% - 95%)  Wt(kg): --  I&O's Summary      Appearance: Normal	  HEENT:   Normal oral mucosa, PERRL, EOMI	  Lymphatic: No lymphadenopathy  Cardiovascular: Normal S1 S2, No JVD, No murmurs,+ click  Respiratory: Lungs clear to auscultation	  Psychiatry: A & O x 3, Mood & affect appropriate  Gastrointestinal:  Soft, Non-tender, + BS	  Skin: No rashes, No ecchymoses, No cyanosis	  Neurologic: Non-focal  Extremities: Normal range of motion, No clubbing, cyanosis or edema  Vascular: Peripheral pulses palpable 2+ bilaterally    ECG:  	av paced   	  	  LABS:	 	      CARDIAC MARKERS ( 30 Dec 2023 17:50 )  x     / x     / 103 U/L / x     / x          Troponin I, High Sensitivity Result: 377.7 ng/L (12-31-23 @ 05:45)  Troponin I, High Sensitivity Result: 407.7 ng/L (12-31-23 @ 00:12)  Troponin I, High Sensitivity Result: 406.0 ng/L (12-30-23 @ 19:50)  Troponin I, High Sensitivity Result: 368.0 ng/L (12-30-23 @ 17:50)                          15.0   6.74  )-----------( 196      ( 31 Dec 2023 05:45 )             45.9     12-31    136  |  99  |  25<H>  ----------------------------<  94  3.0<L>   |  31  |  1.51<H>    Ca    9.2      31 Dec 2023 05:45  Phos  2.9     12-31  Mg     1.5     12-31    TPro  7.0  /  Alb  3.3<L>  /  TBili  0.7  /  DBili  x   /  AST  27  /  ALT  26  /  AlkPhos  48  12-30    proBNP:   Lipid Profile: Cholesterol 132  LDL --  HDL 47    ldl calc 59  Ratio --      PT/INR - ( 31 Dec 2023 05:45 )   PT: 27.2 sec;   INR: 2.45 ratio         PTT - ( 30 Dec 2023 19:20 )  PTT:53.4 sec      < from: CT Chest No Cont (12.30.23 @ 20:43) >  ACC: [id number] EXAM:  CT CHEST   ORDERED BY: LOY MENDEZ     PROCEDURE DATE:  12/30/2023          INTERPRETATION:  CLINICAL INDICATION: R posterior rib pain fall syncope   yesterday    PROCEDURE: CT of the chest was performed without intravenous contrast.   Coronal and sagittal reconstruction images were obtained.    CONTRAST/COMPLICATIONS:  IV Contrast: NONE  Oral Contrast: NONE  Complications: None reported at time of study completion    COMPARISON: None.    FINDINGS: Evaluation ofthe thoracic organs/vasculature is limited   without intravenous contrast. Artifact from patient's arms and   respiratory motion degrades images.    LUNGS AND AIRWAYS: Patent central airways. Bronchial thickening with   areas of narrowing at the lungbases, which may be due to mucus plugging.   Linear opacities at the left > right lung base, suggesting   atelectasis/scarring.  PLEURA: No pleural effusion or pneumothorax.  HEART: Normal size. No pericardial effusion. Mitral annular and coronary   desiccation. Aortic valve replacement.  VESSELS: Atherosclerosis. Ectatic distal ascending aorta/ascending aortic   arch (4.0 cm). CABG.  MEDIASTINUM AND YARELI: Subcentimeter lymph nodes.  CHEST WALL AND LOWER NECK: Left-sided AICD/pacemaker. Median sternotomy.  UPPER ABDOMEN: Fatty atrophy of the visualized pancreas. Bilateral renal   cysts. Bilateral perinephric stranding. Nonobstructing 0.2 cm right renal   stone. Colon diverticulosis. Partially visualized visualized duodenal   diverticulum.  BONES: Acute fractures of the right sixth through ninth anterior ribs   (nondisplaced right sixth and seventh and minimally displaced right   eighth and ninth ribs). Corticated, slight deformities of additional   bilateral ribs, suggesting chronic. Degenerative changes of the spine.    IMPRESSION:    Evaluation of the thoracic organs/vasculature is limited without   intravenous contrast. Acute right-sided rib fractures. No obvious   pneumothorax.    Bibasilar atelectasis/scarring. Findings suggestive of mucus plugging at   the lung bases.    Additional findings as described.    --- End of Report ---            KIP PARKS MD; Attending Radiologist  This document has been electronically signed. Dec 30 2023  9:15PM    < end of copied text >  < from: CT Head No Cont (12.30.23 @ 20:42) >  ACC: [id number] EXAM:  CT CERVICAL SPINE   ORDERED BY: LOY MENDEZ     ACC: [id number] EXAM:  CT BRAIN   ORDERED BY: LOY MENDEZ     PROCEDURE DATE:  12/30/2023          INTERPRETATION:  CT head without IV contrast    CLINICALINFORMATION:  Fall   Intracranial hemorrhage.    TECHNIQUE: Contiguous axial 5 mm sections were obtained through the head.   Sagittal and coronal 2-D reformatted images were also obtained.   This   scan was performed using automatic exposure control (radiation dose   reduction software) to obtain a diagnostic image quality scan with   patient dose as low as reasonably achievable.    FINDINGS:   No previous examinations are available for review.    The brain demonstrates moderate anterior periventricular white matter   ischemia.   No acute cerebral cortical infarct is seen.  No intracranial   hemorrhage is found.  No mass effect is found in the brain.    The ventricles, sulci and basal cisterns show mild global atrophy.    The orbits are unremarkable.  The paranasal sinuses are significant for   moderate mucosal thickening in the BILATERAL ethmoid sinuses and   secretions in the RIGHT sphenoid sinus.  The nasal cavity appears intact.    The nasopharynx is symmetric.  The central skull base, petrous temporal   bones and calvarium remain intact.        CT cervical spine without IV contrast    CLINICAL INFORMATION: Fracture, trauma, neck pain.  Neck pain, spinal   stenosis, spondylosis. fall syncope    TECHNIQUE:  Contiguous axial 2.0 mm sections were obtained through the   cervical spine using a single helical acquisition.  Additional 2 mm   sagittal and coronal reformatted reconstructions of the spine were   obtained.  These additional reformatted images were used to evaluate the   spine for alignment, vertebral fractures and the integrity of the the   posterior elements.   This scan was performed using automatic exposure   control (radiation dose reduction software) to obtain a diagnostic image   quality scan with patient dose as low as reasonably achievable.    FINDINGS:   No prior similar studies are available for review    Cervical vertebral body heights are maintained. No vertebral fracture is   seen. No destructive bone lesion is found.  Alignment is significant for   straightening on a degenerative basis.  Facet joints appear intact and   aligned.    Cervical intervertebral disc spaces show mild to moderate degenerative   disc disease and spondylosis at C3-4 C6-7 with loss of disc height and   associated degenerative endplate changes. There is narrowing of the RIGHT   C6/7 neural foramen due to uncovertebral spurring. Mild posterior   osteophytic ridge/disc complexes at C4-5 through C6-7 flatten the ventral   thecal sac.    The skull base appears intact.  No neck mass is recognized.  Paraspinal   soft tissues appear intact. Visualized lymph nodes appear to be within   physiologic size limits.  Prominence of the aortic arch. See chest CT for   details.          IMPRESSION:    CT HEAD: Moderate anterior periventricular white matter ischemia.    Mild   global atrophy.  Moderate mucosal thickening in the BILATERAL ethmoid   sinuses and secretions in the RIGHT sphenoid sinus.    CT cervical spine:   No vertebral fracture is recognized.  Mild to   moderate degenerative disc disease and spondylosis at C3-4 C6-7 with loss   of disc height and associated degenerative endplate changes. There is   narrowing of the RIGHT C6/7 neural foramen due to uncovertebral spurring.   Mild posterior osteophytic ridge/disccomplexes at C4-5 through C6-7   flatten the ventral thecal sac.Prominence of the aortic arch. See chest   CT for details.    --- End of Report ---            MARK MAHONEY MD; Attending Radiologist  This document has been electronically signed. Dec30 2023  8:52PM

## 2023-12-31 NOTE — CONSULT NOTE ADULT - PROBLEM SELECTOR RECOMMENDATION 3
likely  underlying bronchospasm  R/o viral etiology  Bronchodilators  Pfts as OP  Robitussin 10cc po TID  CT Chest noted

## 2023-12-31 NOTE — CONSULT NOTE ADULT - PROBLEM SELECTOR RECOMMENDATION 9
pain management  incentive spirometry Likely Vasovagal  R/o ACS  tele monitoring  Cardio and neuro eval  Ct head noted cont meds  Echo

## 2023-12-31 NOTE — PATIENT PROFILE ADULT - FALL HARM RISK - HARM RISK INTERVENTIONS
Assistance OOB with selected safe patient handling equipment/Communicate Risk of Fall with Harm to all staff/Reinforce activity limits and safety measures with patient and family/Tailored Fall Risk Interventions/Visual Cue: Yellow wristband and red socks/Bed in lowest position, wheels locked, appropriate side rails in place/Call bell, personal items and telephone in reach/Instruct patient to call for assistance before getting out of bed or chair/Non-slip footwear when patient is out of bed/[place] to call system/Physically safe environment - no spills, clutter or unnecessary equipment/Purposeful Proactive Rounding/Room/bathroom lighting operational, light cord in reach Assistance OOB with selected safe patient handling equipment/Communicate Risk of Fall with Harm to all staff/Reinforce activity limits and safety measures with patient and family/Tailored Fall Risk Interventions/Visual Cue: Yellow wristband and red socks/Bed in lowest position, wheels locked, appropriate side rails in place/Call bell, personal items and telephone in reach/Instruct patient to call for assistance before getting out of bed or chair/Non-slip footwear when patient is out of bed/[place] to call system/Physically safe environment - no spills, clutter or unnecessary equipment/Purposeful Proactive Rounding/Room/bathroom lighting operational, light cord in reach

## 2023-12-31 NOTE — CONSULT NOTE ADULT - SUBJECTIVE AND OBJECTIVE BOX
PULMONARY CONSULT NOTE      LIBERTY ALCANTAR  MRN-[id number]    History of Present Illness:  History of Present Illness:   Patient is a 79 y/o M with PMH of DM, HTN, HLD, PAF, CHF, PPM and defibrillator who presented after an episode of syncope. Patient's grandson reports that patient has had severe cough for the past 4 days and every time he has a coughing 'fit' the patient feels lightheaded. He reports the patient has a lot of phlegm that he is bringing up while coughing. Patient denies any shortness of breath, fevers, or chills. Patient also denies any sick contacts. Patient's grandson reports that patient was coughing today while moving to his bed when he passed out. He reports that he felt lightheaded before he passed out and regained consciousness with in seconds. Patient denies hitting his head. Patient reports that once he got up he felt pain on his right chest. Patient denies any sick contacts. Patient denies any recent chest pain, palpitations, shortness of breath,, nausea, vomiting, abdominal pain, diarrhea, constipation, melena, hematochezia, dysuria, urinary frequency, or urgency. Patient denies any other complains at this time.        HISTORY OF PRESENT ILLNESS: As Above. Awake, alert, comfortable in bed in NAD    MEDICATIONS  (STANDING):  albuterol/ipratropium for Nebulization 3 milliLiter(s) Nebulizer every 6 hours  atorvastatin 20 milliGRAM(s) Oral at bedtime  fluticasone propionate 50 MICROgram(s)/spray Nasal Spray 1 Spray(s) Both Nostrils two times a day  guaiFENesin ER 1200 milliGRAM(s) Oral every 12 hours  insulin lispro (ADMELOG) corrective regimen sliding scale   SubCutaneous Before meals and at bedtime  lactated ringers. 1000 milliLiter(s) (70 mL/Hr) IV Continuous <Continuous>  levoFLOXacin  Tablet 500 milliGRAM(s) Oral every 24 hours  metoprolol tartrate 12.5 milliGRAM(s) Oral daily  potassium chloride   Powder 40 milliEquivalent(s) Oral every 4 hours  warfarin 6 milliGRAM(s) Oral daily      MEDICATIONS  (PRN):  acetaminophen     Tablet .. 650 milliGRAM(s) Oral every 6 hours PRN Temp greater or equal to 38C (100.4F), Mild Pain (1 - 3)  ondansetron Injectable 4 milliGRAM(s) IV Push every 8 hours PRN Nausea and/or Vomiting  oxyCODONE    IR 5 milliGRAM(s) Oral every 6 hours PRN Moderate Pain (4 - 6)      Allergies    No Known Allergies    Intolerances        PAST MEDICAL & SURGICAL HISTORY:  Pacemaker      Diabetes mellitus      Hypertension      Hyperlipidemia          FAMILY HISTORY:      SOCIAL HISTORY  Smoking History:     REVIEW OF SYSTEMS:    CONSTITUTIONAL:  No fevers, chills, sweats    HEENT:  Eyes:  No diplopia or blurred vision. ENT:  No earache, sore throat or runny nose.    CARDIOVASCULAR:  No pressure, squeezing, tightness, or heaviness about the chest; no palpitations.    RESPIRATORY:  Per HPI    GASTROINTESTINAL:  No abdominal pain, nausea, vomiting or diarrhea.    GENITOURINARY:  No dysuria, frequency or urgency.    NEUROLOGIC:  No paresthesias, fasciculations, seizures or weakness.    PSYCHIATRIC:  No disorder of thought or mood.    Vital Signs Last 24 Hrs  T(C): 36.8 (31 Dec 2023 07:51), Max: 37.4 (30 Dec 2023 15:08)  T(F): 98.2 (31 Dec 2023 07:51), Max: 99.3 (30 Dec 2023 15:08)  HR: 67 (31 Dec 2023 04:44) (61 - 67)  BP: 102/58 (31 Dec 2023 04:44) (102/58 - 125/61)  BP(mean): --  RR: 18 (31 Dec 2023 07:51) (18 - 20)  SpO2: 95% (31 Dec 2023 07:51) (90% - 95%)    Parameters below as of 31 Dec 2023 04:44  Patient On (Oxygen Delivery Method): room air      I&O's Detail      PHYSICAL EXAMINATION:    GENERAL: The patient is a well-developed, well-nourished _____in no apparent distress.     HEENT: Head is normocephalic and atraumatic. Extraocular muscles are intact. Mucous membranes are moist.     NECK: Supple.     LUNGS: Rhonchi B/L    HEART: Regular rate and rhythm without murmur.    ABDOMEN: Soft, nontender, and nondistended.  No hepatosplenomegaly is noted.    EXTREMITIES: Without any cyanosis, clubbing, rash, lesions or edema.    NEUROLOGIC: Grossly intact.      LABS:                        15.0   6.74  )-----------( 196      ( 31 Dec 2023 05:45 )             45.9     12-31    136  |  99  |  25<H>  ----------------------------<  94  3.0<L>   |  31  |  1.51<H>    Ca    9.2      31 Dec 2023 05:45  Phos  2.9     12-31  Mg     1.5     12-31    TPro  7.0  /  Alb  3.3<L>  /  TBili  0.7  /  DBili  x   /  AST  27  /  ALT  26  /  AlkPhos  48  12-30    PT/INR - ( 31 Dec 2023 05:45 )   PT: 27.2 sec;   INR: 2.45 ratio         PTT - ( 30 Dec 2023 19:20 )  PTT:53.4 sec  Urinalysis Basic - ( 31 Dec 2023 05:45 )    Color: x / Appearance: x / SG: x / pH: x  Gluc: 94 mg/dL / Ketone: x  / Bili: x / Urobili: x   Blood: x / Protein: x / Nitrite: x   Leuk Esterase: x / RBC: x / WBC x   Sq Epi: x / Non Sq Epi: x / Bacteria: x        CARDIAC MARKERS ( 30 Dec 2023 17:50 )  x     / x     / 103 U/L / x     / x                    MICROBIOLOGY:    RADIOLOGY & ADDITIONAL STUDIES:    CXR:  < from: CT Chest No Cont (12.30.23 @ 20:43) >  IMPRESSION:    Evaluation of the thoracic organs/vasculature is limited without   intravenous contrast. Acute right-sided rib fractures. No obvious   pneumothorax.    Bibasilar atelectasis/scarring. Findings suggestive of mucus plugging at   the lung bases.    Additional findings as described.    --- End of Report ---      < end of copied text >  < from: CT Cervical Spine No Cont (12.30.23 @ 20:43) >  IMPRESSION:    CT HEAD: Moderate anterior periventricular white matter ischemia.    Mild   global atrophy.  Moderate mucosal thickening in the BILATERAL ethmoid   sinuses and secretions in the RIGHT sphenoid sinus.    CT cervical spine:   No vertebral fracture is recognized.  Mild to   moderate degenerative disc disease and spondylosis at C3-4 C6-7 with loss   of disc height and associated degenerative endplate changes. There is   narrowing of the RIGHT C6/7 neural foramen due to uncovertebral spurring.   Mild posterior osteophytic ridge/disccomplexes at C4-5 through C6-7   flatten the ventral thecal sac.Prominence of the aortic arch. See chest   CT for details.      < end of copied text >    Ct scan chest;    ekg;    echo: PULMONARY CONSULT NOTE      LIBERTY ALCANTAR  MRN-[id number]    History of Present Illness:  History of Present Illness:   Patient is a 77 y/o M with PMH of DM, HTN, HLD, PAF, CHF, PPM and defibrillator who presented after an episode of syncope. Patient's grandson reports that patient has had severe cough for the past 4 days and every time he has a coughing 'fit' the patient feels lightheaded. He reports the patient has a lot of phlegm that he is bringing up while coughing. Patient denies any shortness of breath, fevers, or chills. Patient also denies any sick contacts. Patient's grandson reports that patient was coughing today while moving to his bed when he passed out. He reports that he felt lightheaded before he passed out and regained consciousness with in seconds. Patient denies hitting his head. Patient reports that once he got up he felt pain on his right chest. Patient denies any sick contacts. Patient denies any recent chest pain, palpitations, shortness of breath,, nausea, vomiting, abdominal pain, diarrhea, constipation, melena, hematochezia, dysuria, urinary frequency, or urgency. Patient denies any other complains at this time.        HISTORY OF PRESENT ILLNESS: As Above. Awake, alert, comfortable in bed in NAD    MEDICATIONS  (STANDING):  albuterol/ipratropium for Nebulization 3 milliLiter(s) Nebulizer every 6 hours  atorvastatin 20 milliGRAM(s) Oral at bedtime  fluticasone propionate 50 MICROgram(s)/spray Nasal Spray 1 Spray(s) Both Nostrils two times a day  guaiFENesin ER 1200 milliGRAM(s) Oral every 12 hours  insulin lispro (ADMELOG) corrective regimen sliding scale   SubCutaneous Before meals and at bedtime  lactated ringers. 1000 milliLiter(s) (70 mL/Hr) IV Continuous <Continuous>  levoFLOXacin  Tablet 500 milliGRAM(s) Oral every 24 hours  metoprolol tartrate 12.5 milliGRAM(s) Oral daily  potassium chloride   Powder 40 milliEquivalent(s) Oral every 4 hours  warfarin 6 milliGRAM(s) Oral daily      MEDICATIONS  (PRN):  acetaminophen     Tablet .. 650 milliGRAM(s) Oral every 6 hours PRN Temp greater or equal to 38C (100.4F), Mild Pain (1 - 3)  ondansetron Injectable 4 milliGRAM(s) IV Push every 8 hours PRN Nausea and/or Vomiting  oxyCODONE    IR 5 milliGRAM(s) Oral every 6 hours PRN Moderate Pain (4 - 6)      Allergies    No Known Allergies    Intolerances        PAST MEDICAL & SURGICAL HISTORY:  Pacemaker      Diabetes mellitus      Hypertension      Hyperlipidemia          FAMILY HISTORY:      SOCIAL HISTORY  Smoking History:     REVIEW OF SYSTEMS:    CONSTITUTIONAL:  No fevers, chills, sweats    HEENT:  Eyes:  No diplopia or blurred vision. ENT:  No earache, sore throat or runny nose.    CARDIOVASCULAR:  No pressure, squeezing, tightness, or heaviness about the chest; no palpitations.    RESPIRATORY:  Per HPI    GASTROINTESTINAL:  No abdominal pain, nausea, vomiting or diarrhea.    GENITOURINARY:  No dysuria, frequency or urgency.    NEUROLOGIC:  No paresthesias, fasciculations, seizures or weakness.    PSYCHIATRIC:  No disorder of thought or mood.    Vital Signs Last 24 Hrs  T(C): 36.8 (31 Dec 2023 07:51), Max: 37.4 (30 Dec 2023 15:08)  T(F): 98.2 (31 Dec 2023 07:51), Max: 99.3 (30 Dec 2023 15:08)  HR: 67 (31 Dec 2023 04:44) (61 - 67)  BP: 102/58 (31 Dec 2023 04:44) (102/58 - 125/61)  BP(mean): --  RR: 18 (31 Dec 2023 07:51) (18 - 20)  SpO2: 95% (31 Dec 2023 07:51) (90% - 95%)    Parameters below as of 31 Dec 2023 04:44  Patient On (Oxygen Delivery Method): room air      I&O's Detail      PHYSICAL EXAMINATION:    GENERAL: The patient is a well-developed, well-nourished _____in no apparent distress.     HEENT: Head is normocephalic and atraumatic. Extraocular muscles are intact. Mucous membranes are moist.     NECK: Supple.     LUNGS: Rhonchi B/L    HEART: Regular rate and rhythm without murmur.    ABDOMEN: Soft, nontender, and nondistended.  No hepatosplenomegaly is noted.    EXTREMITIES: Without any cyanosis, clubbing, rash, lesions or edema.    NEUROLOGIC: Grossly intact.      LABS:                        15.0   6.74  )-----------( 196      ( 31 Dec 2023 05:45 )             45.9     12-31    136  |  99  |  25<H>  ----------------------------<  94  3.0<L>   |  31  |  1.51<H>    Ca    9.2      31 Dec 2023 05:45  Phos  2.9     12-31  Mg     1.5     12-31    TPro  7.0  /  Alb  3.3<L>  /  TBili  0.7  /  DBili  x   /  AST  27  /  ALT  26  /  AlkPhos  48  12-30    PT/INR - ( 31 Dec 2023 05:45 )   PT: 27.2 sec;   INR: 2.45 ratio         PTT - ( 30 Dec 2023 19:20 )  PTT:53.4 sec  Urinalysis Basic - ( 31 Dec 2023 05:45 )    Color: x / Appearance: x / SG: x / pH: x  Gluc: 94 mg/dL / Ketone: x  / Bili: x / Urobili: x   Blood: x / Protein: x / Nitrite: x   Leuk Esterase: x / RBC: x / WBC x   Sq Epi: x / Non Sq Epi: x / Bacteria: x        CARDIAC MARKERS ( 30 Dec 2023 17:50 )  x     / x     / 103 U/L / x     / x                    MICROBIOLOGY:    RADIOLOGY & ADDITIONAL STUDIES:    CXR:  < from: CT Chest No Cont (12.30.23 @ 20:43) >  IMPRESSION:    Evaluation of the thoracic organs/vasculature is limited without   intravenous contrast. Acute right-sided rib fractures. No obvious   pneumothorax.    Bibasilar atelectasis/scarring. Findings suggestive of mucus plugging at   the lung bases.    Additional findings as described.    --- End of Report ---      < end of copied text >  < from: CT Cervical Spine No Cont (12.30.23 @ 20:43) >  IMPRESSION:    CT HEAD: Moderate anterior periventricular white matter ischemia.    Mild   global atrophy.  Moderate mucosal thickening in the BILATERAL ethmoid   sinuses and secretions in the RIGHT sphenoid sinus.    CT cervical spine:   No vertebral fracture is recognized.  Mild to   moderate degenerative disc disease and spondylosis at C3-4 C6-7 with loss   of disc height and associated degenerative endplate changes. There is   narrowing of the RIGHT C6/7 neural foramen due to uncovertebral spurring.   Mild posterior osteophytic ridge/disccomplexes at C4-5 through C6-7   flatten the ventral thecal sac.Prominence of the aortic arch. See chest   CT for details.      < end of copied text >    Ct scan chest;    ekg;    echo:

## 2024-01-01 LAB
ANION GAP SERPL CALC-SCNC: 7 MMOL/L — SIGNIFICANT CHANGE UP (ref 5–17)
ANION GAP SERPL CALC-SCNC: 7 MMOL/L — SIGNIFICANT CHANGE UP (ref 5–17)
APTT BLD: 116.3 SEC — HIGH (ref 24.5–35.6)
APTT BLD: 116.3 SEC — HIGH (ref 24.5–35.6)
APTT BLD: 141.9 SEC — CRITICAL HIGH (ref 24.5–35.6)
APTT BLD: 141.9 SEC — CRITICAL HIGH (ref 24.5–35.6)
APTT BLD: 43.4 SEC — HIGH (ref 24.5–35.6)
APTT BLD: 43.4 SEC — HIGH (ref 24.5–35.6)
BUN SERPL-MCNC: 23 MG/DL — HIGH (ref 7–18)
BUN SERPL-MCNC: 23 MG/DL — HIGH (ref 7–18)
CALCIUM SERPL-MCNC: 8.2 MG/DL — LOW (ref 8.4–10.5)
CALCIUM SERPL-MCNC: 8.2 MG/DL — LOW (ref 8.4–10.5)
CHLORIDE SERPL-SCNC: 101 MMOL/L — SIGNIFICANT CHANGE UP (ref 96–108)
CHLORIDE SERPL-SCNC: 101 MMOL/L — SIGNIFICANT CHANGE UP (ref 96–108)
CO2 SERPL-SCNC: 28 MMOL/L — SIGNIFICANT CHANGE UP (ref 22–31)
CO2 SERPL-SCNC: 28 MMOL/L — SIGNIFICANT CHANGE UP (ref 22–31)
CREAT SERPL-MCNC: 1.32 MG/DL — HIGH (ref 0.5–1.3)
CREAT SERPL-MCNC: 1.32 MG/DL — HIGH (ref 0.5–1.3)
EGFR: 56 ML/MIN/1.73M2 — LOW
EGFR: 56 ML/MIN/1.73M2 — LOW
GLUCOSE BLDC GLUCOMTR-MCNC: 107 MG/DL — HIGH (ref 70–99)
GLUCOSE BLDC GLUCOMTR-MCNC: 107 MG/DL — HIGH (ref 70–99)
GLUCOSE BLDC GLUCOMTR-MCNC: 138 MG/DL — HIGH (ref 70–99)
GLUCOSE BLDC GLUCOMTR-MCNC: 138 MG/DL — HIGH (ref 70–99)
GLUCOSE BLDC GLUCOMTR-MCNC: 183 MG/DL — HIGH (ref 70–99)
GLUCOSE BLDC GLUCOMTR-MCNC: 183 MG/DL — HIGH (ref 70–99)
GLUCOSE BLDC GLUCOMTR-MCNC: 86 MG/DL — SIGNIFICANT CHANGE UP (ref 70–99)
GLUCOSE BLDC GLUCOMTR-MCNC: 86 MG/DL — SIGNIFICANT CHANGE UP (ref 70–99)
GLUCOSE SERPL-MCNC: 90 MG/DL — SIGNIFICANT CHANGE UP (ref 70–99)
GLUCOSE SERPL-MCNC: 90 MG/DL — SIGNIFICANT CHANGE UP (ref 70–99)
HCT VFR BLD CALC: 42.4 % — SIGNIFICANT CHANGE UP (ref 39–50)
HCT VFR BLD CALC: 42.4 % — SIGNIFICANT CHANGE UP (ref 39–50)
HCT VFR BLD CALC: 45.3 % — SIGNIFICANT CHANGE UP (ref 39–50)
HCT VFR BLD CALC: 45.3 % — SIGNIFICANT CHANGE UP (ref 39–50)
HGB BLD-MCNC: 14.3 G/DL — SIGNIFICANT CHANGE UP (ref 13–17)
HGB BLD-MCNC: 14.3 G/DL — SIGNIFICANT CHANGE UP (ref 13–17)
HGB BLD-MCNC: 15.1 G/DL — SIGNIFICANT CHANGE UP (ref 13–17)
HGB BLD-MCNC: 15.1 G/DL — SIGNIFICANT CHANGE UP (ref 13–17)
INR BLD: 1.95 RATIO — HIGH (ref 0.85–1.18)
INR BLD: 1.95 RATIO — HIGH (ref 0.85–1.18)
MAGNESIUM SERPL-MCNC: 1.5 MG/DL — LOW (ref 1.6–2.6)
MAGNESIUM SERPL-MCNC: 1.5 MG/DL — LOW (ref 1.6–2.6)
MCHC RBC-ENTMCNC: 31.2 PG — SIGNIFICANT CHANGE UP (ref 27–34)
MCHC RBC-ENTMCNC: 31.2 PG — SIGNIFICANT CHANGE UP (ref 27–34)
MCHC RBC-ENTMCNC: 31.3 PG — SIGNIFICANT CHANGE UP (ref 27–34)
MCHC RBC-ENTMCNC: 31.3 PG — SIGNIFICANT CHANGE UP (ref 27–34)
MCHC RBC-ENTMCNC: 33.3 GM/DL — SIGNIFICANT CHANGE UP (ref 32–36)
MCHC RBC-ENTMCNC: 33.3 GM/DL — SIGNIFICANT CHANGE UP (ref 32–36)
MCHC RBC-ENTMCNC: 33.7 GM/DL — SIGNIFICANT CHANGE UP (ref 32–36)
MCHC RBC-ENTMCNC: 33.7 GM/DL — SIGNIFICANT CHANGE UP (ref 32–36)
MCV RBC AUTO: 92.6 FL — SIGNIFICANT CHANGE UP (ref 80–100)
MCV RBC AUTO: 92.6 FL — SIGNIFICANT CHANGE UP (ref 80–100)
MCV RBC AUTO: 94 FL — SIGNIFICANT CHANGE UP (ref 80–100)
MCV RBC AUTO: 94 FL — SIGNIFICANT CHANGE UP (ref 80–100)
NRBC # BLD: 0 /100 WBCS — SIGNIFICANT CHANGE UP (ref 0–0)
PHOSPHATE SERPL-MCNC: 2.8 MG/DL — SIGNIFICANT CHANGE UP (ref 2.5–4.5)
PHOSPHATE SERPL-MCNC: 2.8 MG/DL — SIGNIFICANT CHANGE UP (ref 2.5–4.5)
PLATELET # BLD AUTO: 196 K/UL — SIGNIFICANT CHANGE UP (ref 150–400)
PLATELET # BLD AUTO: 196 K/UL — SIGNIFICANT CHANGE UP (ref 150–400)
PLATELET # BLD AUTO: 198 K/UL — SIGNIFICANT CHANGE UP (ref 150–400)
PLATELET # BLD AUTO: 198 K/UL — SIGNIFICANT CHANGE UP (ref 150–400)
POTASSIUM SERPL-MCNC: 3.3 MMOL/L — LOW (ref 3.5–5.3)
POTASSIUM SERPL-MCNC: 3.3 MMOL/L — LOW (ref 3.5–5.3)
POTASSIUM SERPL-SCNC: 3.3 MMOL/L — LOW (ref 3.5–5.3)
POTASSIUM SERPL-SCNC: 3.3 MMOL/L — LOW (ref 3.5–5.3)
PROTHROM AB SERPL-ACNC: 21.8 SEC — HIGH (ref 9.5–13)
PROTHROM AB SERPL-ACNC: 21.8 SEC — HIGH (ref 9.5–13)
RBC # BLD: 4.58 M/UL — SIGNIFICANT CHANGE UP (ref 4.2–5.8)
RBC # BLD: 4.58 M/UL — SIGNIFICANT CHANGE UP (ref 4.2–5.8)
RBC # BLD: 4.82 M/UL — SIGNIFICANT CHANGE UP (ref 4.2–5.8)
RBC # BLD: 4.82 M/UL — SIGNIFICANT CHANGE UP (ref 4.2–5.8)
RBC # FLD: 13.6 % — SIGNIFICANT CHANGE UP (ref 10.3–14.5)
RBC # FLD: 13.6 % — SIGNIFICANT CHANGE UP (ref 10.3–14.5)
RBC # FLD: 13.7 % — SIGNIFICANT CHANGE UP (ref 10.3–14.5)
RBC # FLD: 13.7 % — SIGNIFICANT CHANGE UP (ref 10.3–14.5)
SODIUM SERPL-SCNC: 136 MMOL/L — SIGNIFICANT CHANGE UP (ref 135–145)
SODIUM SERPL-SCNC: 136 MMOL/L — SIGNIFICANT CHANGE UP (ref 135–145)
WBC # BLD: 6.88 K/UL — SIGNIFICANT CHANGE UP (ref 3.8–10.5)
WBC # BLD: 6.88 K/UL — SIGNIFICANT CHANGE UP (ref 3.8–10.5)
WBC # BLD: 7.79 K/UL — SIGNIFICANT CHANGE UP (ref 3.8–10.5)
WBC # BLD: 7.79 K/UL — SIGNIFICANT CHANGE UP (ref 3.8–10.5)
WBC # FLD AUTO: 6.88 K/UL — SIGNIFICANT CHANGE UP (ref 3.8–10.5)
WBC # FLD AUTO: 6.88 K/UL — SIGNIFICANT CHANGE UP (ref 3.8–10.5)
WBC # FLD AUTO: 7.79 K/UL — SIGNIFICANT CHANGE UP (ref 3.8–10.5)
WBC # FLD AUTO: 7.79 K/UL — SIGNIFICANT CHANGE UP (ref 3.8–10.5)

## 2024-01-01 RX ORDER — SACUBITRIL AND VALSARTAN 24; 26 MG/1; MG/1
1 TABLET, FILM COATED ORAL
Refills: 0 | Status: DISCONTINUED | OUTPATIENT
Start: 2024-01-01 | End: 2024-01-08

## 2024-01-01 RX ORDER — MAGNESIUM SULFATE 500 MG/ML
1 VIAL (ML) INJECTION ONCE
Refills: 0 | Status: COMPLETED | OUTPATIENT
Start: 2024-01-01 | End: 2024-01-01

## 2024-01-01 RX ORDER — MAGNESIUM OXIDE 400 MG ORAL TABLET 241.3 MG
400 TABLET ORAL
Refills: 0 | Status: DISCONTINUED | OUTPATIENT
Start: 2024-01-01 | End: 2024-01-01

## 2024-01-01 RX ORDER — HEPARIN SODIUM 5000 [USP'U]/ML
1000 INJECTION INTRAVENOUS; SUBCUTANEOUS
Qty: 25000 | Refills: 0 | Status: DISCONTINUED | OUTPATIENT
Start: 2024-01-01 | End: 2024-01-08

## 2024-01-01 RX ORDER — POTASSIUM CHLORIDE 20 MEQ
20 PACKET (EA) ORAL
Refills: 0 | Status: DISCONTINUED | OUTPATIENT
Start: 2024-01-01 | End: 2024-01-01

## 2024-01-01 RX ORDER — POTASSIUM CHLORIDE 20 MEQ
40 PACKET (EA) ORAL ONCE
Refills: 0 | Status: COMPLETED | OUTPATIENT
Start: 2024-01-01 | End: 2024-01-01

## 2024-01-01 RX ORDER — METOPROLOL TARTRATE 50 MG
12.5 TABLET ORAL
Refills: 0 | Status: DISCONTINUED | OUTPATIENT
Start: 2024-01-01 | End: 2024-01-08

## 2024-01-01 RX ADMIN — Medication 1 SPRAY(S): at 18:06

## 2024-01-01 RX ADMIN — Medication 1 SPRAY(S): at 05:29

## 2024-01-01 RX ADMIN — SACUBITRIL AND VALSARTAN 1 TABLET(S): 24; 26 TABLET, FILM COATED ORAL at 18:05

## 2024-01-01 RX ADMIN — Medication 100 GRAM(S): at 09:13

## 2024-01-01 RX ADMIN — Medication 12.5 MILLIGRAM(S): at 18:06

## 2024-01-01 RX ADMIN — Medication 3 MILLILITER(S): at 15:23

## 2024-01-01 RX ADMIN — Medication 3 MILLILITER(S): at 09:26

## 2024-01-01 RX ADMIN — HEPARIN SODIUM 800 UNIT(S)/HR: 5000 INJECTION INTRAVENOUS; SUBCUTANEOUS at 19:20

## 2024-01-01 RX ADMIN — Medication 3 MILLILITER(S): at 20:15

## 2024-01-01 RX ADMIN — HEPARIN SODIUM 0 UNIT(S)/HR: 5000 INJECTION INTRAVENOUS; SUBCUTANEOUS at 15:33

## 2024-01-01 RX ADMIN — HEPARIN SODIUM 800 UNIT(S)/HR: 5000 INJECTION INTRAVENOUS; SUBCUTANEOUS at 16:38

## 2024-01-01 RX ADMIN — HEPARIN SODIUM 1000 UNIT(S)/HR: 5000 INJECTION INTRAVENOUS; SUBCUTANEOUS at 09:12

## 2024-01-01 RX ADMIN — ATORVASTATIN CALCIUM 20 MILLIGRAM(S): 80 TABLET, FILM COATED ORAL at 21:31

## 2024-01-01 RX ADMIN — Medication 1: at 21:30

## 2024-01-01 RX ADMIN — Medication 1200 MILLIGRAM(S): at 05:29

## 2024-01-01 RX ADMIN — Medication 1200 MILLIGRAM(S): at 18:05

## 2024-01-01 RX ADMIN — Medication 40 MILLIEQUIVALENT(S): at 12:30

## 2024-01-01 RX ADMIN — WARFARIN SODIUM 6 MILLIGRAM(S): 2.5 TABLET ORAL at 21:31

## 2024-01-01 NOTE — PROGRESS NOTE ADULT - SUBJECTIVE AND OBJECTIVE BOX
Date of Service 01-01-24 @ 09:46    CHIEF COMPLAINT:Patient is a 78y old  Male who presents with a chief complaint of Syncope.Pt appears comfortable.    	  REVIEW OF SYSTEMS:  CONSTITUTIONAL: No fever, weight loss, or fatigue  EYES: No eye pain, visual disturbances, or discharge  ENT:  No difficulty hearing, tinnitus, vertigo; No sinus or throat pain  NECK: No pain or stiffness  RESPIRATORY: No cough, wheezing, chills or hemoptysis; No Shortness of Breath  CARDIOVASCULAR: No chest pain, palpitations, passing out, dizziness, or leg swelling  GASTROINTESTINAL: No abdominal or epigastric pain. No nausea, vomiting, or hematemesis; No diarrhea or constipation. No melena or hematochezia.  GENITOURINARY: No dysuria, frequency, hematuria, or incontinence  NEUROLOGICAL: No headaches, memory loss, loss of strength, numbness, or tremors  SKIN: No itching, burning, rashes, or lesions   LYMPH Nodes: No enlarged glands  ENDOCRINE: No heat or cold intolerance; No hair loss  MUSCULOSKELETAL: No joint pain or swelling; No muscle, back, or extremity pain  PSYCHIATRIC: No depression, anxiety, mood swings, or difficulty sleeping  HEME/LYMPH: No easy bruising, or bleeding gums  ALLERGY AND IMMUNOLOGIC: No hives or eczema	    PHYSICAL EXAM:  T(C): 36.4 (01-01-24 @ 07:58), Max: 37.4 (12-31-23 @ 23:37)  HR: 63 (01-01-24 @ 07:58) (57 - 63)  BP: 149/67 (01-01-24 @ 07:58) (109/64 - 149/67)  RR: 18 (01-01-24 @ 07:58) (18 - 18)  SpO2: 94% (01-01-24 @ 07:58) (90% - 95%)  Wt(kg): --  I&O's Summary      Appearance: Normal	  HEENT:   Normal oral mucosa, PERRL, EOMI	  Lymphatic: No lymphadenopathy  Cardiovascular: Normal S1 S2,+click  Respiratory: Lungs clear to auscultation	  Psychiatry: A & O x 3, Mood & affect appropriate  Gastrointestinal:  Soft, Non-tender, + BS	  Skin: No rashes, No ecchymoses, No cyanosis	  Neurologic: Non-focal  Extremities: Normal range of motion, No clubbing, cyanosis or edema  Vascular: Peripheral pulses palpable 2+ bilaterally    MEDICATIONS  (STANDING):  albuterol/ipratropium for Nebulization 3 milliLiter(s) Nebulizer every 6 hours  atorvastatin 20 milliGRAM(s) Oral at bedtime  fluticasone propionate 50 MICROgram(s)/spray Nasal Spray 1 Spray(s) Both Nostrils two times a day  guaiFENesin ER 1200 milliGRAM(s) Oral every 12 hours  heparin  Infusion. 1000 Unit(s)/Hr (10 mL/Hr) IV Continuous <Continuous>  insulin lispro (ADMELOG) corrective regimen sliding scale   SubCutaneous Before meals and at bedtime  lactated ringers. 1000 milliLiter(s) (70 mL/Hr) IV Continuous <Continuous>  levoFLOXacin  Tablet 500 milliGRAM(s) Oral every 24 hours  metoprolol tartrate 12.5 milliGRAM(s) Oral daily  potassium chloride   Powder 40 milliEquivalent(s) Oral once  sacubitril 24 mG/valsartan 26 mG 1 Tablet(s) Oral two times a day  warfarin 6 milliGRAM(s) Oral daily      TELEMETRY: 	paced    	  	  LABS:	 	    CARDIAC MARKERS:  CARDIAC MARKERS ( 30 Dec 2023 17:50 )  x     / x     / 103 U/L / x     / x          Troponin I, High Sensitivity Result: 377.7 ng/L (12-31 @ 05:45)  Troponin I, High Sensitivity Result: 407.7 ng/L (12-31 @ 00:12)  Troponin I, High Sensitivity Result: 406.0 ng/L (12-30 @ 19:50)  Troponin I, High Sensitivity Result: 368.0 ng/L (12-30 @ 17:50)                            14.3   6.88  )-----------( 198      ( 01 Jan 2024 05:02 )             42.4     01-01    136  |  101  |  23<H>  ----------------------------<  90  3.3<L>   |  28  |  1.32<H>    Ca    8.2<L>      01 Jan 2024 05:02  Phos  2.8     01-01  Mg     1.5     01-01    TPro  7.0  /  Alb  3.3<L>  /  TBili  0.7  /  DBili  x   /  AST  27  /  ALT  26  /  AlkPhos  48  12-30    proBNP:   Lipid Profile: Cholesterol 132  LDL --  HDL 47    Ldl calc 59  Ratio --    PT/INR - ( 01 Jan 2024 05:02 )   PT: 21.8 sec;   INR: 1.95 ratio         PTT - ( 01 Jan 2024 05:02 )  PTT:43.4 sec

## 2024-01-01 NOTE — PROGRESS NOTE ADULT - SUBJECTIVE AND OBJECTIVE BOX
Patient is a 78y old  Male who presents with a chief complaint of Syncope (31 Dec 2023 12:13)  in bed in [facility].    INTERVAL HPI/OVERNIGHT EVENTS:      VITAL SIGNS:  T(F): 97.5 (01-01-24 @ 07:58)  HR: 63 (01-01-24 @ 07:58)  BP: 149/67 (01-01-24 @ 07:58)  RR: 18 (01-01-24 @ 07:58)  SpO2: 94% (01-01-24 @ 07:58)  Wt(kg): --  I&O's Detail          REVIEW OF SYSTEMS:    CONSTITUTIONAL:  No fevers, chills, sweats    HEENT:  Eyes:  No diplopia or blurred vision. ENT:  No earache, sore throat or runny nose.    CARDIOVASCULAR:  No pressure, squeezing, tightness, or heaviness about the chest; no palpitations.    RESPIRATORY:  Per HPI    GASTROINTESTINAL:  No abdominal pain, nausea, vomiting or diarrhea.    GENITOURINARY:  No dysuria, frequency or urgency.    NEUROLOGIC:  No paresthesias, fasciculations, seizures or weakness.    PSYCHIATRIC:  No disorder of thought or mood.      PHYSICAL EXAM:    Constitutional: Well developed and nourished  Eyes:Perrla  ENMT: normal  Neck:supple  Respiratory: good air entry  Cardiovascular: S1 S2 regular  Gastrointestinal: Soft, Non tender  Extremities: No edema  Vascular:normal  Neurological:Awake, alert,Ox3  Musculoskeletal:Normal      MEDICATIONS  (STANDING):  albuterol/ipratropium for Nebulization 3 milliLiter(s) Nebulizer every 6 hours  atorvastatin 20 milliGRAM(s) Oral at bedtime  fluticasone propionate 50 MICROgram(s)/spray Nasal Spray 1 Spray(s) Both Nostrils two times a day  guaiFENesin ER 1200 milliGRAM(s) Oral every 12 hours  heparin  Infusion. 1000 Unit(s)/Hr (10 mL/Hr) IV Continuous <Continuous>  insulin lispro (ADMELOG) corrective regimen sliding scale   SubCutaneous Before meals and at bedtime  lactated ringers. 1000 milliLiter(s) (70 mL/Hr) IV Continuous <Continuous>  levoFLOXacin  Tablet 500 milliGRAM(s) Oral every 24 hours  metoprolol tartrate 12.5 milliGRAM(s) Oral daily  potassium chloride   Powder 40 milliEquivalent(s) Oral once  warfarin 6 milliGRAM(s) Oral daily    MEDICATIONS  (PRN):  acetaminophen     Tablet .. 650 milliGRAM(s) Oral every 6 hours PRN Temp greater or equal to 38C (100.4F), Mild Pain (1 - 3)  ondansetron Injectable 4 milliGRAM(s) IV Push every 8 hours PRN Nausea and/or Vomiting  oxyCODONE    IR 5 milliGRAM(s) Oral every 6 hours PRN Moderate Pain (4 - 6)      Allergies    No Known Allergies    Intolerances        LABS:                        14.3   6.88  )-----------( 198      ( 01 Jan 2024 05:02 )             42.4     01-01    136  |  101  |  23<H>  ----------------------------<  90  3.3<L>   |  28  |  1.32<H>    Ca    8.2<L>      01 Jan 2024 05:02  Phos  2.8     01-01  Mg     1.5     01-01    TPro  7.0  /  Alb  3.3<L>  /  TBili  0.7  /  DBili  x   /  AST  27  /  ALT  26  /  AlkPhos  48  12-30    PT/INR - ( 01 Jan 2024 05:02 )   PT: 21.8 sec;   INR: 1.95 ratio         PTT - ( 01 Jan 2024 05:02 )  PTT:43.4 sec  Urinalysis Basic - ( 01 Jan 2024 05:02 )    Color: x / Appearance: x / SG: x / pH: x  Gluc: 90 mg/dL / Ketone: x  / Bili: x / Urobili: x   Blood: x / Protein: x / Nitrite: x   Leuk Esterase: x / RBC: x / WBC x   Sq Epi: x / Non Sq Epi: x / Bacteria: x        CARDIAC MARKERS ( 30 Dec 2023 17:50 )  x     / x     / 103 U/L / x     / x          CAPILLARY BLOOD GLUCOSE      POCT Blood Glucose.: 86 mg/dL (01 Jan 2024 07:36)  POCT Blood Glucose.: 101 mg/dL (31 Dec 2023 21:56)  POCT Blood Glucose.: 79 mg/dL (31 Dec 2023 16:59)  POCT Blood Glucose.: 112 mg/dL (31 Dec 2023 12:41)        RADIOLOGY & ADDITIONAL TESTS:    CXR:    Ct scan chest:    ekg;    echo: Patient is a 78y old  Male who presents with a chief complaint of Syncope (31 Dec 2023 12:13)  in bed in [facility].    INTERVAL HPI/OVERNIGHT EVENTS:      VITAL SIGNS:  T(F): 97.5 (01-01-24 @ 07:58)  HR: 63 (01-01-24 @ 07:58)  BP: 149/67 (01-01-24 @ 07:58)  RR: 18 (01-01-24 @ 07:58)  SpO2: 94% (01-01-24 @ 07:58)  Wt(kg): --  I&O's Detail          REVIEW OF SYSTEMS:    CONSTITUTIONAL:  No fevers, chills, sweats    HEENT:  Eyes:  No diplopia or blurred vision. ENT:  No earache, sore throat or runny nose.    CARDIOVASCULAR:  No pressure, squeezing, tightness, or heaviness about the chest; no palpitations.    RESPIRATORY:  Per HPI    GASTROINTESTINAL:  No abdominal pain, nausea, vomiting or diarrhea.    GENITOURINARY:  No dysuria, frequency or urgency.    NEUROLOGIC:  No paresthesias, fasciculations, seizures or weakness.    PSYCHIATRIC:  No disorder of thought or mood.      PHYSICAL EXAM:    Constitutional: Well developed and nourished  Eyes:Perrla  ENMT: normal  Neck:supple  Respiratory: good air entry  Cardiovascular: S1 S2 regular  Gastrointestinal: Soft, Non tender  Extremities: No edema  Vascular:normal  Neurological:Awake, alert,Ox3  Musculoskeletal:Normal      MEDICATIONS  (STANDING):  albuterol/ipratropium for Nebulization 3 milliLiter(s) Nebulizer every 6 hours  atorvastatin 20 milliGRAM(s) Oral at bedtime  fluticasone propionate 50 MICROgram(s)/spray Nasal Spray 1 Spray(s) Both Nostrils two times a day  guaiFENesin ER 1200 milliGRAM(s) Oral every 12 hours  heparin  Infusion. 1000 Unit(s)/Hr (10 mL/Hr) IV Continuous <Continuous>  insulin lispro (ADMELOG) corrective regimen sliding scale   SubCutaneous Before meals and at bedtime  lactated ringers. 1000 milliLiter(s) (70 mL/Hr) IV Continuous <Continuous>  levoFLOXacin  Tablet 500 milliGRAM(s) Oral every 24 hours  metoprolol tartrate 12.5 milliGRAM(s) Oral daily  potassium chloride   Powder 40 milliEquivalent(s) Oral once  warfarin 6 milliGRAM(s) Oral daily    MEDICATIONS  (PRN):  acetaminophen     Tablet .. 650 milliGRAM(s) Oral every 6 hours PRN Temp greater or equal to 38C (100.4F), Mild Pain (1 - 3)  ondansetron Injectable 4 milliGRAM(s) IV Push every 8 hours PRN Nausea and/or Vomiting  oxyCODONE    IR 5 milliGRAM(s) Oral every 6 hours PRN Moderate Pain (4 - 6)      Allergies    No Known Allergies    Intolerances        LABS:                        14.3   6.88  )-----------( 198      ( 01 Jan 2024 05:02 )             42.4     01-01    136  |  101  |  23<H>  ----------------------------<  90  3.3<L>   |  28  |  1.32<H>    Ca    8.2<L>      01 Jan 2024 05:02  Phos  2.8     01-01  Mg     1.5     01-01    TPro  7.0  /  Alb  3.3<L>  /  TBili  0.7  /  DBili  x   /  AST  27  /  ALT  26  /  AlkPhos  48  12-30    PT/INR - ( 01 Jan 2024 05:02 )   PT: 21.8 sec;   INR: 1.95 ratio         PTT - ( 01 Jan 2024 05:02 )  PTT:43.4 sec  Urinalysis Basic - ( 01 Jan 2024 05:02 )    Color: x / Appearance: x / SG: x / pH: x  Gluc: 90 mg/dL / Ketone: x  / Bili: x / Urobili: x   Blood: x / Protein: x / Nitrite: x   Leuk Esterase: x / RBC: x / WBC x   Sq Epi: x / Non Sq Epi: x / Bacteria: x        CARDIAC MARKERS ( 30 Dec 2023 17:50 )  x     / x     / 103 U/L / x     / x          CAPILLARY BLOOD GLUCOSE      POCT Blood Glucose.: 86 mg/dL (01 Jan 2024 07:36)  POCT Blood Glucose.: 101 mg/dL (31 Dec 2023 21:56)  POCT Blood Glucose.: 79 mg/dL (31 Dec 2023 16:59)  POCT Blood Glucose.: 112 mg/dL (31 Dec 2023 12:41)        RADIOLOGY & ADDITIONAL TESTS:    CXR:    Ct scan chest:    ekg;    echo: Patient is a 78y old  Male who presents with a chief complaint of Syncope (31 Dec 2023 12:13)  Awake, alert, comfortable out of bed to chair in NAD    INTERVAL HPI/OVERNIGHT EVENTS:      VITAL SIGNS:  T(F): 97.5 (01-01-24 @ 07:58)  HR: 63 (01-01-24 @ 07:58)  BP: 149/67 (01-01-24 @ 07:58)  RR: 18 (01-01-24 @ 07:58)  SpO2: 94% (01-01-24 @ 07:58)  Wt(kg): --  I&O's Detail          REVIEW OF SYSTEMS:    CONSTITUTIONAL:  No fevers, chills, sweats    HEENT:  Eyes:  No diplopia or blurred vision. ENT:  No earache, sore throat or runny nose.    CARDIOVASCULAR:  No pressure, squeezing, tightness, or heaviness about the chest; no palpitations.    RESPIRATORY:  Per HPI    GASTROINTESTINAL:  No abdominal pain, nausea, vomiting or diarrhea.    GENITOURINARY:  No dysuria, frequency or urgency.    NEUROLOGIC:  No paresthesias, fasciculations, seizures or weakness.    PSYCHIATRIC:  No disorder of thought or mood.      PHYSICAL EXAM:    Constitutional: Well developed and nourished  Eyes:Perrla  ENMT: normal  Neck:supple  Respiratory: good air entry  Cardiovascular: S1 S2 regular  Gastrointestinal: Soft, Non tender  Extremities: No edema  Vascular:normal  Neurological:Awake, alert,Ox3  Musculoskeletal:Normal      MEDICATIONS  (STANDING):  albuterol/ipratropium for Nebulization 3 milliLiter(s) Nebulizer every 6 hours  atorvastatin 20 milliGRAM(s) Oral at bedtime  fluticasone propionate 50 MICROgram(s)/spray Nasal Spray 1 Spray(s) Both Nostrils two times a day  guaiFENesin ER 1200 milliGRAM(s) Oral every 12 hours  heparin  Infusion. 1000 Unit(s)/Hr (10 mL/Hr) IV Continuous <Continuous>  insulin lispro (ADMELOG) corrective regimen sliding scale   SubCutaneous Before meals and at bedtime  lactated ringers. 1000 milliLiter(s) (70 mL/Hr) IV Continuous <Continuous>  levoFLOXacin  Tablet 500 milliGRAM(s) Oral every 24 hours  metoprolol tartrate 12.5 milliGRAM(s) Oral daily  potassium chloride   Powder 40 milliEquivalent(s) Oral once  warfarin 6 milliGRAM(s) Oral daily    MEDICATIONS  (PRN):  acetaminophen     Tablet .. 650 milliGRAM(s) Oral every 6 hours PRN Temp greater or equal to 38C (100.4F), Mild Pain (1 - 3)  ondansetron Injectable 4 milliGRAM(s) IV Push every 8 hours PRN Nausea and/or Vomiting  oxyCODONE    IR 5 milliGRAM(s) Oral every 6 hours PRN Moderate Pain (4 - 6)      Allergies    No Known Allergies    Intolerances        LABS:                        14.3   6.88  )-----------( 198      ( 01 Jan 2024 05:02 )             42.4     01-01    136  |  101  |  23<H>  ----------------------------<  90  3.3<L>   |  28  |  1.32<H>    Ca    8.2<L>      01 Jan 2024 05:02  Phos  2.8     01-01  Mg     1.5     01-01    TPro  7.0  /  Alb  3.3<L>  /  TBili  0.7  /  DBili  x   /  AST  27  /  ALT  26  /  AlkPhos  48  12-30    PT/INR - ( 01 Jan 2024 05:02 )   PT: 21.8 sec;   INR: 1.95 ratio         PTT - ( 01 Jan 2024 05:02 )  PTT:43.4 sec  Urinalysis Basic - ( 01 Jan 2024 05:02 )    Color: x / Appearance: x / SG: x / pH: x  Gluc: 90 mg/dL / Ketone: x  / Bili: x / Urobili: x   Blood: x / Protein: x / Nitrite: x   Leuk Esterase: x / RBC: x / WBC x   Sq Epi: x / Non Sq Epi: x / Bacteria: x        CARDIAC MARKERS ( 30 Dec 2023 17:50 )  x     / x     / 103 U/L / x     / x          CAPILLARY BLOOD GLUCOSE      POCT Blood Glucose.: 86 mg/dL (01 Jan 2024 07:36)  POCT Blood Glucose.: 101 mg/dL (31 Dec 2023 21:56)  POCT Blood Glucose.: 79 mg/dL (31 Dec 2023 16:59)  POCT Blood Glucose.: 112 mg/dL (31 Dec 2023 12:41)        RADIOLOGY & ADDITIONAL TESTS:  < from: CT Chest No Cont (12.30.23 @ 20:43) >    IMPRESSION:    Evaluation of the thoracic organs/vasculature is limited without   intravenous contrast. Acute right-sided rib fractures. No obvious   pneumothorax.    Bibasilar atelectasis/scarring. Findings suggestive of mucus plugging at   the lung bases.    Additional findings as described.    < end of copied text >  < from: CT Cervical Spine No Cont (12.30.23 @ 20:43) >  IMPRESSION:    CT HEAD: Moderate anterior periventricular white matter ischemia.    Mild   global atrophy.  Moderate mucosal thickening in the BILATERAL ethmoid   sinuses and secretions in the RIGHT sphenoid sinus.    CT cervical spine:   No vertebral fracture is recognized.  Mild to   moderate degenerative disc disease and spondylosis at C3-4 C6-7 with loss   of disc height and associated degenerative endplate changes. There is   narrowing of the RIGHT C6/7 neural foramen due to uncovertebral spurring.   Mild posterior osteophytic ridge/disccomplexes at C4-5 through C6-7   flatten the ventral thecal sac.Prominence of the aortic arch. See chest   CT for details.      < end of copied text >    CXR:    Ct scan chest:    ekg;    echo:

## 2024-01-01 NOTE — PROGRESS NOTE ADULT - ASSESSMENT
77 y/o M with PMH of DM, HTN, HLD, PAF, CHF, S/P defibrillator,S/P CABG and AVR  who presented after an episode of syncope,acute lt sided rib fx,sinusitis,BEVERLY.  1.Tele monitoring.  2.Interrogate AICD.  3.Echocardiogram.  4.PAF,?mech AVR-coumadin and heparin derip..  5.Replace k+.  6.CHF-b blocker.Entresto to be resumed.  7.CAD-statin,lopressor.  8.?BEVERLY-resolving.  9.Neurology eval noted. 79 y/o M with PMH of DM, HTN, HLD, PAF, CHF, S/P defibrillator,S/P CABG and AVR  who presented after an episode of syncope,acute lt sided rib fx,sinusitis,BEVERLY.  1.Tele monitoring.  2.Interrogate AICD.  3.Echocardiogram.  4.PAF,?mech AVR-coumadin and heparin derip..  5.Replace k+.  6.CHF-b blocker.Entresto to be resumed.  7.CAD-statin,lopressor.  8.?BEVERLY-resolving.  9.Neurology eval noted.

## 2024-01-01 NOTE — DISCHARGE NOTE PROVIDER - CARE PROVIDER_API CALL
Usama Llanos  Pulmonary Disease  [address] [place] [id number]  Phone: [phone]  Fax: [phone]  Established Patient  Follow Up Time: 2 weeks   Usama Llanos  Pulmonary Disease  [address] [place] [id number]  Phone: [phone]  Fax: [phone]  Established Patient  Follow Up Time: 2 weeks

## 2024-01-01 NOTE — DISCHARGE NOTE PROVIDER - NSDCCPCAREPLAN_GEN_ALL_CORE_FT
PRINCIPAL DISCHARGE DIAGNOSIS  Diagnosis: NSTEMI (non-ST elevation myocardial infarction)  Assessment and Plan of Treatment:       SECONDARY DISCHARGE DIAGNOSES  Diagnosis: Diabetes mellitus  Assessment and Plan of Treatment:     Diagnosis: Syncope  Assessment and Plan of Treatment:      PRINCIPAL DISCHARGE DIAGNOSIS  Diagnosis: Syncope  Assessment and Plan of Treatment: You presented after a syncopal episode. You had a ct scan of your head which was negative for any acute pathologies. You were followed by cardiology and neurology. You were  also seen by a physcial therapist who reccommends that you have no skilled needs.   Syncope is also called fainting or passing out. Syncope is a sudden, temporary loss of consciousness, followed by a fall from a standing or sitting position.  Seek care immediately if:  You suddenly have double vision, difficulty speaking, numbness, and cannot move your arms or legs.  You have chest pain and trouble breathing.  You vomit blood or material that looks like coffee grounds.  You see blood in your bowel movement.  You have another syncope episode.  You have a headache, fast heartbeat, or feel too dizzy to stand up.  Take your medicine as directed.   Follow up with your doctor as directed  Manage syncope:  Keep a record of your syncope episodes. Sit or lie down when needed.   Prevent a syncope episode:  Move slowly and let yourself get used to one position before you move to another position. Do not strain if you are constipated.      SECONDARY DISCHARGE DIAGNOSES  Diagnosis: Sinusitis  Assessment and Plan of Treatment: You were found to have an acute sinus inflammation. You were treated with antibiotics. Sinusitis is inflammation or infection of your sinuses. Sinusitis is most often caused by a virus. Return to the emergency department if:  You have trouble breathing or wheezing that is getting worse.  You have a stiff neck, a fever, or a bad headache.  You are more sleepy than normal, or you notice changes in your ability to think, move, or talk.  You have swelling of your forehead or scalp.  Your symptoms may go away on their own. Acetaminophen decreases pain and fever. Decongestants help reduce swelling and drain mucus in the nose and sinuses. They may help you breathe easier. Antihistamines help dry mucus in the nose and relieve sneezing. Antibiotics help treat or prevent a bacterial infection. Take your medicine as directed.   Self-care:  Use a humidifier to increase air moisture in your home. This may make it easier for you to breathe and help decrease your cough.  Sleep with your head elevated. Place an extra pillow under your head before you go to sleep to help your sinuses drain.  Drink liquids as directed. Do not smoke, and avoid secondhand smoke. Nicotine and other chemicals in cigarettes and cigars can make your symptoms worse.  Prevent the spread of germs:  Wash your hands often with soap and water.  Follow up with your doctor    Diagnosis: Chronic CHF  Assessment and Plan of Treatment: Weigh yourself daily.  If you gain 3lbs in 3 days, or 5lbs in a week call your Health Care Provider.  Do not eat or drink foods containing more than 2000mg of salt (sodium) in your diet every day.  Call your Health Care Provider if you have any swelling or increased swelling in your feet, ankles, and/or stomach.  Take all of your medication as directed.  If you become dizzy call your Health Care Provider.      Diagnosis: Atrial fibrillation  Assessment and Plan of Treatment: Atrial fibrillation is the most common heart rhythm problem & has the risk of stroke & heart attack  It helps if you control your blood pressure, not drink more than 1-2 alcohol drinks per day, cut down on caffeine, getting treatment for over active thyroid gland, & getting exercise  Call your doctor if you feel your heart racing or beating unusually, chest tightness or pain, lightheaded, faint, shortness of breath especially with exercise  It is important to take your heart medication as prescribed  You may be on anticoagulation which is very important to take as directed - you may need blood work to monitor drug levels      Diagnosis: CAD (coronary artery disease)  Assessment and Plan of Treatment: Coronary artery disease is a condition where the arteries the supply the heart muscle gets clogged with fatty deposits & puts you at risk for a heart attack. Continue with medications as prescribed.   Call your doctor if you have any new pain, pressure, or discomfort in the center of your chest, pain, tingling or discomfort in arms, back, neck, jaw, or stomach, shortness of breath, nausea, vomiting, burping or heartburn, sweating, cold and clammy skin, racing or abnormal heartbeat for more than 10 minutes or if they keep coming & going.  Call 911 and do not tr to get to hospital by care  You can help yourself with lifestyle changes (quitting smoking if you smoke), eat lots of fruits & vegetables & low fat dairy products, not a lot of meat & fatty foods, walk or some form of physical activity most days of the week, lose weight if you are overweight  Take your cardiac medication as prescribed to lower cholesterol, to lower blood pressure, aspirin to prevent blood clots, and diabetes control  Make sure to keep appointments with doctor for cardiac follow up care      Diagnosis: Hypertension  Assessment and Plan of Treatment: You have a history of high blood pressure. High blood pressure is a condition that puts you at risk for heart attack, stroke and kidney disease. Please continue to take your medications as prescribed. You can also help control your blood pressure by maintaining a healthy weight, eating a diet low in fat and rich in fruits and vegetables, reduce the amount of salt in your diet. Also, reduce alcohol and try to include some form of physical activity daily for at least 30 mins. Follow up with your medical doctor to establish long term blood pressure treatment goals.  Notify your doctor if you have any of the following symptoms:   Dizziness, Lightheadedness, Blurry vision, Headache, Chest pain, Shortness of breath      Diagnosis: Hyperlipidemia  Assessment and Plan of Treatment: You have a history of hyperlipidemia, which is when you have too much cholesterol in your blood. High amounts of cholesterol in your blood can put you at higher risks for heart attck, strokes and other health problems. Follow up with PCP for treatment goals, continue medication as prescribed, have liver function testing every 3 months as anti lipid medications can cause liver irritation, eat low fat meals, avoid red meat, butter, fried foods and cheese. Get daily exercise.      Diagnosis: Diabetes mellitus  Assessment and Plan of Treatment: Continue to follow with your primary care MD or your endocrinologist. Discuss what the goal hemoglobin A1C level is for you.  Follow a heart healthy diabetic diet. If you check your fingerstick glucose at home, call your MD if it is greater than 250mg/dL on 2 occasions or less than 100mg/dL on 2 occasions. Know signs of low blood sugar, such as: dizziness, shakiness, sweating, confusion, hunger, nervousness- drink 4 ounces apple juice if occurs and call your doctor. Know early signs of high blood sugar, such as: frequent urination, increased thirst, blurry vision, fatigue, headache - call your doctor if this occurs.       PRINCIPAL DISCHARGE DIAGNOSIS  Diagnosis: Syncope  Assessment and Plan of Treatment: You presented after a syncopal episode. You had a ct scan of your head which was negative for any acute pathologies. You were followed by cardiology and neurology. You were  also seen by a physcial therapist who reccommends that you have no skilled needs.   Syncope is also called fainting or passing out. Syncope is a sudden, temporary loss of consciousness, followed by a fall from a standing or sitting position.  Seek care immediately if:  You suddenly have double vision, difficulty speaking, numbness, and cannot move your arms or legs.  You have chest pain and trouble breathing.  You vomit blood or material that looks like coffee grounds.  You see blood in your bowel movement.  You have another syncope episode.  You have a headache, fast heartbeat, or feel too dizzy to stand up.  Take your medicine as directed.   Follow up with your doctor as directed  Manage syncope:  Keep a record of your syncope episodes. Sit or lie down when needed.   Prevent a syncope episode:  Move slowly and let yourself get used to one position before you move to another position. Do not strain if you are constipated.      SECONDARY DISCHARGE DIAGNOSES  Diagnosis: Diabetes mellitus  Assessment and Plan of Treatment: Continue to follow with your primary care MD or your endocrinologist. Discuss what the goal hemoglobin A1C level is for you.  Follow a heart healthy diabetic diet. If you check your fingerstick glucose at home, call your MD if it is greater than 250mg/dL on 2 occasions or less than 100mg/dL on 2 occasions. Know signs of low blood sugar, such as: dizziness, shakiness, sweating, confusion, hunger, nervousness- drink 4 ounces apple juice if occurs and call your doctor. Know early signs of high blood sugar, such as: frequent urination, increased thirst, blurry vision, fatigue, headache - call your doctor if this occurs.      Diagnosis: Sinusitis  Assessment and Plan of Treatment: You were found to have an acute sinus inflammation. You were treated with antibiotics. Sinusitis is inflammation or infection of your sinuses. Sinusitis is most often caused by a virus. Return to the emergency department if:  You have trouble breathing or wheezing that is getting worse.  You have a stiff neck, a fever, or a bad headache.  You are more sleepy than normal, or you notice changes in your ability to think, move, or talk.  You have swelling of your forehead or scalp.  Your symptoms may go away on their own. Acetaminophen decreases pain and fever. Decongestants help reduce swelling and drain mucus in the nose and sinuses. They may help you breathe easier. Antihistamines help dry mucus in the nose and relieve sneezing. Antibiotics help treat or prevent a bacterial infection. Take your medicine as directed.   Self-care:  Use a humidifier to increase air moisture in your home. This may make it easier for you to breathe and help decrease your cough.  Sleep with your head elevated. Place an extra pillow under your head before you go to sleep to help your sinuses drain.  Drink liquids as directed. Do not smoke, and avoid secondhand smoke. Nicotine and other chemicals in cigarettes and cigars can make your symptoms worse.  Prevent the spread of germs:  Wash your hands often with soap and water.  Follow up with your doctor    Diagnosis: Atrial fibrillation  Assessment and Plan of Treatment: Atrial fibrillation is the most common heart rhythm problem & has the risk of stroke & heart attack  It helps if you control your blood pressure, not drink more than 1-2 alcohol drinks per day, cut down on caffeine, getting treatment for over active thyroid gland, & getting exercise  Call your doctor if you feel your heart racing or beating unusually, chest tightness or pain, lightheaded, faint, shortness of breath especially with exercise  It is important to take your heart medication as prescribed  You may be on anticoagulation which is very important to take as directed - you may need blood work to monitor drug levels      Diagnosis: Chronic CHF  Assessment and Plan of Treatment: Weigh yourself daily.  If you gain 3lbs in 3 days, or 5lbs in a week call your Health Care Provider.  Do not eat or drink foods containing more than 2000mg of salt (sodium) in your diet every day.  Call your Health Care Provider if you have any swelling or increased swelling in your feet, ankles, and/or stomach.  Take all of your medication as directed.  If you become dizzy call your Health Care Provider.  Please follow up with your Cardiologist on restarting your DIuretic ( Indapamide) and Kerendia.      Diagnosis: CAD (coronary artery disease)  Assessment and Plan of Treatment: Coronary artery disease is a condition where the arteries the supply the heart muscle gets clogged with fatty deposits & puts you at risk for a heart attack. Continue with medications as prescribed.   Call your doctor if you have any new pain, pressure, or discomfort in the center of your chest, pain, tingling or discomfort in arms, back, neck, jaw, or stomach, shortness of breath, nausea, vomiting, burping or heartburn, sweating, cold and clammy skin, racing or abnormal heartbeat for more than 10 minutes or if they keep coming & going.  Call 911 and do not tr to get to hospital by care  You can help yourself with lifestyle changes (quitting smoking if you smoke), eat lots of fruits & vegetables & low fat dairy products, not a lot of meat & fatty foods, walk or some form of physical activity most days of the week, lose weight if you are overweight  Take your cardiac medication as prescribed to lower cholesterol, to lower blood pressure, aspirin to prevent blood clots, and diabetes control  Make sure to keep appointments with doctor for cardiac follow up care      Diagnosis: Hypertension  Assessment and Plan of Treatment: You have a history of high blood pressure. High blood pressure is a condition that puts you at risk for heart attack, stroke and kidney disease. Please continue to take your medications as prescribed. You can also help control your blood pressure by maintaining a healthy weight, eating a diet low in fat and rich in fruits and vegetables, reduce the amount of salt in your diet. Also, reduce alcohol and try to include some form of physical activity daily for at least 30 mins. Follow up with your medical doctor to establish long term blood pressure treatment goals.  Notify your doctor if you have any of the following symptoms:   Dizziness, Lightheadedness, Blurry vision, Headache, Chest pain, Shortness of breath      Diagnosis: Hyperlipidemia  Assessment and Plan of Treatment: You have a history of hyperlipidemia, which is when you have too much cholesterol in your blood. High amounts of cholesterol in your blood can put you at higher risks for heart attck, strokes and other health problems. Follow up with PCP for treatment goals, continue medication as prescribed, have liver function testing every 3 months as anti lipid medications can cause liver irritation, eat low fat meals, avoid red meat, butter, fried foods and cheese. Get daily exercise.      Diagnosis: H/O mechanical aortic valve replacement  Assessment and Plan of Treatment: Please continue your coumadin for two days and follow up with your Cardiologist on further medical management.  Please continue taking your medication as prescribed. If you have any questions or concerns about your medication please direct them to your prescribing Healthcare Provider.

## 2024-01-01 NOTE — DISCHARGE NOTE PROVIDER - HOSPITAL COURSE
77 y/o M with PMH of DM, HTN, HLD, PAF, CHF, PPM and defibrillator who presented after an episode of syncope. Patient also complains of severe cough for the past 4 days. Patient is being admitted for further management and work up of syncope. RVP negative, CT HEAD: Moderate anterior periventricular white matter ischemia.    Mild global atrophy.  Moderate mucosal thickening in the BILATERAL ethmoid sinuses and secretions in the RIGHT sphenoid sinus. CT chest: Evaluation of the thoracic organs/vasculature is limited without intravenous contrast. Acute right-sided rib fractures. No obvious pneumothorax. Bibasilar atelectasis/scarring. Findings suggestive of mucus plugging at the lung bases. started on flonase, levaquin and mucinex. + troponins, downtrended, Cardiology consulted and ECHO:                                     afib, started on heparin gtt for Coumadin bridge, c/w metoprolol  Consulted neurology - Dr. Coffey, reccs:         INCOMPLETE 1/1/24   Given patient's clinical status and current hemodynamic stability decision was made to discharge. Pt is stable for discharge per attending and is advised to f/u with PCP as out-patient. Please refer to Pt's complete medical chart with documents for a full hospital course, for this is only a brief summary.         79 y/o M with PMH of DM, HTN, HLD, PAF, CHF, PPM and defibrillator who presented after an episode of syncope. Patient also complains of severe cough for the past 4 days. Patient is being admitted for further management and work up of syncope. RVP negative, CT HEAD: Moderate anterior periventricular white matter ischemia.    Mild global atrophy.  Moderate mucosal thickening in the BILATERAL ethmoid sinuses and secretions in the RIGHT sphenoid sinus. CT chest: Evaluation of the thoracic organs/vasculature is limited without intravenous contrast. Acute right-sided rib fractures. No obvious pneumothorax. Bibasilar atelectasis/scarring. Findings suggestive of mucus plugging at the lung bases. started on flonase, levaquin and mucinex. + troponins, downtrended, Cardiology consulted and ECHO:                                     afib, started on heparin gtt for Coumadin bridge, c/w metoprolol  Consulted neurology - Dr. Coffey, reccs:         INCOMPLETE 1/1/24   Given patient's clinical status and current hemodynamic stability decision was made to discharge. Pt is stable for discharge per attending and is advised to f/u with PCP as out-patient. Please refer to Pt's complete medical chart with documents for a full hospital course, for this is only a brief summary.         79 y/o M with PMH of DM, HTN, HLD, PAF, CHF, PPM and defibrillator who presented after an episode of syncope. Patient also complains of severe cough for the past 4 days. Patient is being admitted for further management and work up of syncope. RVP negative, CT HEAD: Moderate anterior periventricular white matter ischemia.    Mild global atrophy.  Moderate mucosal thickening in the BILATERAL ethmoid sinuses and secretions in the RIGHT sphenoid sinus. CT chest: Evaluation of the thoracic organs/vasculature is limited without intravenous contrast. Acute right-sided rib fractures. No obvious pneumothorax. Bibasilar atelectasis/scarring. Findings suggestive of mucus plugging at the lung bases. started on flonase, levaquin and mucinex. + troponins, downtrended, Cardiology consulted and ECHO G1DD, hypertrophy, Mechanical valve. Afib and mechanical valve, started on heparin gtt for Coumadin bridge, c/w metoprolol  Consulted neurology - Dr. Coffey, no further work up from neuro, continue to treated orthostatic + with IVF.   Stress test no ischemia.  CT chest acute right sided rib fractures, bibasilar atelectasis, possible mucus plugging. Patient no SOB or chest pain.   CT head resulting white matter ischemia    Given patient's clinical status and current hemodynamic stability decision was made to discharge. Pt is stable for discharge per attending and is advised to f/u with PCP as out-patient. Please refer to Pt's complete medical chart with documents for a full hospital course, for this is only a brief summary.         77 y/o M with PMH of DM, HTN, HLD, PAF, CHF, PPM and defibrillator who presented after an episode of syncope. Patient also complains of severe cough for the past 4 days. Patient is being admitted for further management and work up of syncope. RVP negative, CT HEAD: Moderate anterior periventricular white matter ischemia.    Mild global atrophy.  Moderate mucosal thickening in the BILATERAL ethmoid sinuses and secretions in the RIGHT sphenoid sinus. CT chest: Evaluation of the thoracic organs/vasculature is limited without intravenous contrast. Acute right-sided rib fractures. No obvious pneumothorax. Bibasilar atelectasis/scarring. Findings suggestive of mucus plugging at the lung bases. started on flonase, levaquin and mucinex. + troponins, downtrended, Cardiology consulted and ECHO G1DD, hypertrophy, Mechanical valve. Afib and mechanical valve, started on heparin gtt for Coumadin bridge, c/w metoprolol  Consulted neurology - Dr. Coffey, no further work up from neuro, continue to treated orthostatic + with IVF.   Stress test no ischemia.  CT chest acute right sided rib fractures, bibasilar atelectasis, possible mucus plugging. Patient no SOB or chest pain.   CT head resulting white matter ischemia    Given patient's clinical status and current hemodynamic stability decision was made to discharge. Pt is stable for discharge per attending and is advised to f/u with PCP as out-patient. Please refer to Pt's complete medical chart with documents for a full hospital course, for this is only a brief summary.

## 2024-01-01 NOTE — DISCHARGE NOTE PROVIDER - NSDCMRMEDTOKEN_GEN_ALL_CORE_FT
atorvastatin 20 mg oral tablet: 1 tab(s) orally once a day  Entresto 24 mg-26 mg oral tablet: 1 tab(s) orally 2 times a day  Farxiga 5 mg oral tablet: 1 tab(s) orally once a day  indapamide 2.5 mg oral tablet: 1 tab(s) orally once a day  Kerendia 10 mg oral tablet: 1 tab(s) orally once a day  MetFORMIN (Eqv-Fortamet) 1000 mg oral tablet, extended release: 1 tab(s) orally once a day  metoprolol succinate 25 mg oral capsule, extended release: 1 cap(s) orally once a day  warfarin 6 mg oral tablet: 1 tab(s) orally once a day   atorvastatin 20 mg oral tablet: 1 tab(s) orally once a day  Farxiga 5 mg oral tablet: 1 tab(s) orally once a day  MetFORMIN (Eqv-Fortamet) 1000 mg oral tablet, extended release: 1 tab(s) orally once a day  metoprolol succinate 25 mg oral capsule, extended release: 1 cap(s) orally once a day  sacubitril-valsartan 24 mg-26 mg oral tablet: 1 tab(s) orally 2 times a day  warfarin 7.5 mg oral tablet: 1 tab(s) orally once a day (at bedtime)

## 2024-01-01 NOTE — PROGRESS NOTE ADULT - SUBJECTIVE AND OBJECTIVE BOX
Patient is a 78y old  Male who presents with a chief complaint of Syncope (31 Dec 2023 12:13)    pt seen in ed tele [ x ], reg med floor [   ], bed [ x ], chair at bedside [   ], awake and responsive [ x ],   lethargic [  ],  nad [ x ]        Allergies    No Known Allergies        Vitals    T(F): 98.8 (01-01-24 @ 04:52), Max: 99.4 (12-31-23 @ 23:37)  HR: 57 (01-01-24 @ 04:52) (57 - 63)  BP: 109/64 (01-01-24 @ 04:52) (109/64 - 145/70)  RR: 18 (01-01-24 @ 04:52) (18 - 18)  SpO2: 92% (01-01-24 @ 04:52) (90% - 95%)  Wt(kg): --  CAPILLARY BLOOD GLUCOSE      POCT Blood Glucose.: 101 mg/dL (31 Dec 2023 21:56)      Labs                          15.0   6.74  )-----------( 196      ( 31 Dec 2023 05:45 )             45.9       12-31    136  |  99  |  25<H>  ----------------------------<  94  3.0<L>   |  31  |  1.51<H>    Ca    9.2      31 Dec 2023 05:45  Phos  2.9     12-31  Mg     1.5     12-31    TPro  7.0  /  Alb  3.3<L>  /  TBili  0.7  /  DBili  x   /  AST  27  /  ALT  26  /  AlkPhos  48  12-30      CARDIAC MARKERS ( 30 Dec 2023 17:50 )  x     / x     / 103 U/L / x     / x          Troponin I, High Sensitivity Result: 377.7 ng/L (12-31-23 @ 05:45)  Troponin I, High Sensitivity Result: 407.7 ng/L (12-31-23 @ 00:12)  Troponin I, High Sensitivity Result: 406.0 ng/L (12-30-23 @ 19:50)  Troponin I, High Sensitivity Result: 368.0 ng/L (12-30-23 @ 17:50)        Radiology Results      Meds    MEDICATIONS  (STANDING):  albuterol/ipratropium for Nebulization 3 milliLiter(s) Nebulizer every 6 hours  atorvastatin 20 milliGRAM(s) Oral at bedtime  fluticasone propionate 50 MICROgram(s)/spray Nasal Spray 1 Spray(s) Both Nostrils two times a day  guaiFENesin ER 1200 milliGRAM(s) Oral every 12 hours  insulin lispro (ADMELOG) corrective regimen sliding scale   SubCutaneous Before meals and at bedtime  lactated ringers. 1000 milliLiter(s) (70 mL/Hr) IV Continuous <Continuous>  levoFLOXacin  Tablet 500 milliGRAM(s) Oral every 24 hours  metoprolol tartrate 12.5 milliGRAM(s) Oral daily  warfarin 6 milliGRAM(s) Oral daily      MEDICATIONS  (PRN):  acetaminophen     Tablet .. 650 milliGRAM(s) Oral every 6 hours PRN Temp greater or equal to 38C (100.4F), Mild Pain (1 - 3)  ondansetron Injectable 4 milliGRAM(s) IV Push every 8 hours PRN Nausea and/or Vomiting  oxyCODONE    IR 5 milliGRAM(s) Oral every 6 hours PRN Moderate Pain (4 - 6)      Physical Exam    Neuro :  no focal deficits  Respiratory: CTA B/L  CV: RRR, S1S2, no murmurs,   Abdominal: Soft, NT, ND +BS,  Extremities: No edema, + peripheral pulses    ASSESSMENT    syncope pos 2nd to severe cough,   r/o cns patho,   troponinemia,   r/o acs,   right rib pain 2nd to fx,   s/p fall,   sinusitis,   cindy   h/o htn,   chf,   ppm,   dm,   PAF         PLAN    cont tele,   aspirin, statin,   neuro cons   othrostatic bp q shift   trop x3 positive noted above   cont coumadin   inr therapeutic noted above   entresto on hold 2nd to cindy    cardio cons  f/u echo   pulm cons   robitussin,   albuterol,   flonase nasal spray,   levaquin, 500mg daily,   gentle hydration   lispro ss,   f/u hgba1c  phys tx eval  cont current meds        Patient is a 78y old  Male who presents with a chief complaint of Syncope (31 Dec 2023 12:13)    pt seen in ed tele [ x ], reg med floor [   ], bed [ x ], chair at bedside [   ], awake and responsive [ x ],   lethargic [  ],  nad [ x ]        Allergies    No Known Allergies        Vitals    T(F): 98.8 (01-01-24 @ 04:52), Max: 99.4 (12-31-23 @ 23:37)  HR: 57 (01-01-24 @ 04:52) (57 - 63)  BP: 109/64 (01-01-24 @ 04:52) (109/64 - 145/70)  RR: 18 (01-01-24 @ 04:52) (18 - 18)  SpO2: 92% (01-01-24 @ 04:52) (90% - 95%)  Wt(kg): --  CAPILLARY BLOOD GLUCOSE      POCT Blood Glucose.: 101 mg/dL (31 Dec 2023 21:56)      Labs                          15.0   6.74  )-----------( 196      ( 31 Dec 2023 05:45 )             45.9       12-31    136  |  99  |  25<H>  ----------------------------<  94  3.0<L>   |  31  |  1.51<H>    Ca    9.2      31 Dec 2023 05:45  Phos  2.9     12-31  Mg     1.5     12-31    TPro  7.0  /  Alb  3.3<L>  /  TBili  0.7  /  DBili  x   /  AST  27  /  ALT  26  /  AlkPhos  48  12-30      CARDIAC MARKERS ( 30 Dec 2023 17:50 )  x     / x     / 103 U/L / x     / x          Troponin I, High Sensitivity Result: 377.7 ng/L (12-31-23 @ 05:45)  Troponin I, High Sensitivity Result: 407.7 ng/L (12-31-23 @ 00:12)  Troponin I, High Sensitivity Result: 406.0 ng/L (12-30-23 @ 19:50)  Troponin I, High Sensitivity Result: 368.0 ng/L (12-30-23 @ 17:50)        Radiology Results      Meds    MEDICATIONS  (STANDING):  albuterol/ipratropium for Nebulization 3 milliLiter(s) Nebulizer every 6 hours  atorvastatin 20 milliGRAM(s) Oral at bedtime  fluticasone propionate 50 MICROgram(s)/spray Nasal Spray 1 Spray(s) Both Nostrils two times a day  guaiFENesin ER 1200 milliGRAM(s) Oral every 12 hours  insulin lispro (ADMELOG) corrective regimen sliding scale   SubCutaneous Before meals and at bedtime  lactated ringers. 1000 milliLiter(s) (70 mL/Hr) IV Continuous <Continuous>  levoFLOXacin  Tablet 500 milliGRAM(s) Oral every 24 hours  metoprolol tartrate 12.5 milliGRAM(s) Oral daily  warfarin 6 milliGRAM(s) Oral daily      MEDICATIONS  (PRN):  acetaminophen     Tablet .. 650 milliGRAM(s) Oral every 6 hours PRN Temp greater or equal to 38C (100.4F), Mild Pain (1 - 3)  ondansetron Injectable 4 milliGRAM(s) IV Push every 8 hours PRN Nausea and/or Vomiting  oxyCODONE    IR 5 milliGRAM(s) Oral every 6 hours PRN Moderate Pain (4 - 6)      Physical Exam    Neuro :  no focal deficits  Respiratory: CTA B/L  CV: RRR, S1S2, no murmurs,   Abdominal: Soft, NT, ND +BS,  Extremities: No edema, + peripheral pulses    ASSESSMENT    syncope pos 2nd to severe cough,   r/o cns patho,   troponinemia,   r/o acs,   right rib pain 2nd to fx,   s/p fall,   sinusitis,   cindy   h/o htn,   chf,   ppm,   dm,   PAF         PLAN    cont tele,   aspirin, statin,   neuro cons   othrostatic bp q shift   trop x3 positive noted above   cont coumadin   inr therapeutic noted above   entresto on hold 2nd to cidny    cardio cons  f/u echo   pulm cons   robitussin,   albuterol,   flonase nasal spray,   levaquin, 500mg daily,   gentle hydration   lispro ss,   f/u hgba1c  phys tx eval  cont current meds        Patient is a 78y old  Male who presents with a chief complaint of Syncope (31 Dec 2023 12:13)    pt seen in ed tele [ x ], reg med floor [   ], bed [ x ], chair at bedside [   ], awake and responsive [ x ],   lethargic [  ],  nad [ x ]        Allergies    No Known Allergies        Vitals    T(F): 98.8 (01-01-24 @ 04:52), Max: 99.4 (12-31-23 @ 23:37)  HR: 57 (01-01-24 @ 04:52) (57 - 63)  BP: 109/64 (01-01-24 @ 04:52) (109/64 - 145/70)  RR: 18 (01-01-24 @ 04:52) (18 - 18)  SpO2: 92% (01-01-24 @ 04:52) (90% - 95%)  Wt(kg): --  CAPILLARY BLOOD GLUCOSE      POCT Blood Glucose.: 101 mg/dL (31 Dec 2023 21:56)      Labs                          15.0   6.74  )-----------( 196      ( 31 Dec 2023 05:45 )             45.9       12-31    136  |  99  |  25<H>  ----------------------------<  94  3.0<L>   |  31  |  1.51<H>    Ca    9.2      31 Dec 2023 05:45  Phos  2.9     12-31  Mg     1.5     12-31    TPro  7.0  /  Alb  3.3<L>  /  TBili  0.7  /  DBili  x   /  AST  27  /  ALT  26  /  AlkPhos  48  12-30    Prothrombin Time and INR, Plasma in AM (01.01.24 @ 05:02)   Prothrombin Time, Plasma: 21.8 sec  INR: 1.95:    CARDIAC MARKERS ( 30 Dec 2023 17:50 )  x     / x     / 103 U/L / x     / x          Troponin I, High Sensitivity Result: 377.7 ng/L (12-31-23 @ 05:45)  Troponin I, High Sensitivity Result: 407.7 ng/L (12-31-23 @ 00:12)  Troponin I, High Sensitivity Result: 406.0 ng/L (12-30-23 @ 19:50)  Troponin I, High Sensitivity Result: 368.0 ng/L (12-30-23 @ 17:50)        Radiology Results      Meds    MEDICATIONS  (STANDING):  albuterol/ipratropium for Nebulization 3 milliLiter(s) Nebulizer every 6 hours  atorvastatin 20 milliGRAM(s) Oral at bedtime  fluticasone propionate 50 MICROgram(s)/spray Nasal Spray 1 Spray(s) Both Nostrils two times a day  guaiFENesin ER 1200 milliGRAM(s) Oral every 12 hours  insulin lispro (ADMELOG) corrective regimen sliding scale   SubCutaneous Before meals and at bedtime  lactated ringers. 1000 milliLiter(s) (70 mL/Hr) IV Continuous <Continuous>  levoFLOXacin  Tablet 500 milliGRAM(s) Oral every 24 hours  metoprolol tartrate 12.5 milliGRAM(s) Oral daily  warfarin 6 milliGRAM(s) Oral daily      MEDICATIONS  (PRN):  acetaminophen     Tablet .. 650 milliGRAM(s) Oral every 6 hours PRN Temp greater or equal to 38C (100.4F), Mild Pain (1 - 3)  ondansetron Injectable 4 milliGRAM(s) IV Push every 8 hours PRN Nausea and/or Vomiting  oxyCODONE    IR 5 milliGRAM(s) Oral every 6 hours PRN Moderate Pain (4 - 6)      Physical Exam    Neuro :  no focal deficits  Respiratory: CTA B/L  CV: RRR, S1S2, no murmurs,   Abdominal: Soft, NT, ND +BS,  Extremities: No edema, + peripheral pulses    ASSESSMENT    syncope pos 2nd to severe cough,   r/o cns patho,   troponinemia,   r/o acs,   right rib pain 2nd to fx,   s/p fall,   sinusitis,   cindy   h/o htn,   chf,   ppm,   dm,   PAF   aortic valve replacement (? mechanical)      PLAN    cont tele,   aspirin, statin,   neuro cons noted   Syncope in patient with dizziness and +jose orthostatics cw orthostatic syncope.  orthostatic bp q shift   trop x3 positive noted above   cont coumadin   inr subtherapeutic noted above   goal inr 2.5 to 3.5   start hepp drip as per hospital normogram  entresto on hold 2nd to cindy    cardio f/u   Interrogate AICD.  statin,lopressor.  f/u echo   pulm f/u    robitussin,   albuterol,   flonase nasal spray,   levaquin, 500mg daily,   gentle hydration   lispro ss,   hgba1c 6.2 noted above  phys tx eval  cont current meds

## 2024-01-02 LAB
ANION GAP SERPL CALC-SCNC: 4 MMOL/L — LOW (ref 5–17)
ANION GAP SERPL CALC-SCNC: 4 MMOL/L — LOW (ref 5–17)
APTT BLD: 109.9 SEC — HIGH (ref 24.5–35.6)
APTT BLD: 109.9 SEC — HIGH (ref 24.5–35.6)
APTT BLD: 53.2 SEC — HIGH (ref 24.5–35.6)
APTT BLD: 53.2 SEC — HIGH (ref 24.5–35.6)
APTT BLD: 73.9 SEC — HIGH (ref 24.5–35.6)
APTT BLD: 73.9 SEC — HIGH (ref 24.5–35.6)
BUN SERPL-MCNC: 16 MG/DL — SIGNIFICANT CHANGE UP (ref 7–18)
BUN SERPL-MCNC: 16 MG/DL — SIGNIFICANT CHANGE UP (ref 7–18)
CALCIUM SERPL-MCNC: 6.8 MG/DL — LOW (ref 8.4–10.5)
CALCIUM SERPL-MCNC: 6.8 MG/DL — LOW (ref 8.4–10.5)
CHLORIDE SERPL-SCNC: 109 MMOL/L — HIGH (ref 96–108)
CHLORIDE SERPL-SCNC: 109 MMOL/L — HIGH (ref 96–108)
CO2 SERPL-SCNC: 26 MMOL/L — SIGNIFICANT CHANGE UP (ref 22–31)
CO2 SERPL-SCNC: 26 MMOL/L — SIGNIFICANT CHANGE UP (ref 22–31)
CREAT SERPL-MCNC: 1 MG/DL — SIGNIFICANT CHANGE UP (ref 0.5–1.3)
CREAT SERPL-MCNC: 1 MG/DL — SIGNIFICANT CHANGE UP (ref 0.5–1.3)
EGFR: 78 ML/MIN/1.73M2 — SIGNIFICANT CHANGE UP
EGFR: 78 ML/MIN/1.73M2 — SIGNIFICANT CHANGE UP
GLUCOSE BLDC GLUCOMTR-MCNC: 101 MG/DL — HIGH (ref 70–99)
GLUCOSE BLDC GLUCOMTR-MCNC: 101 MG/DL — HIGH (ref 70–99)
GLUCOSE BLDC GLUCOMTR-MCNC: 104 MG/DL — HIGH (ref 70–99)
GLUCOSE BLDC GLUCOMTR-MCNC: 104 MG/DL — HIGH (ref 70–99)
GLUCOSE BLDC GLUCOMTR-MCNC: 139 MG/DL — HIGH (ref 70–99)
GLUCOSE BLDC GLUCOMTR-MCNC: 139 MG/DL — HIGH (ref 70–99)
GLUCOSE BLDC GLUCOMTR-MCNC: 98 MG/DL — SIGNIFICANT CHANGE UP (ref 70–99)
GLUCOSE BLDC GLUCOMTR-MCNC: 98 MG/DL — SIGNIFICANT CHANGE UP (ref 70–99)
GLUCOSE SERPL-MCNC: 97 MG/DL — SIGNIFICANT CHANGE UP (ref 70–99)
GLUCOSE SERPL-MCNC: 97 MG/DL — SIGNIFICANT CHANGE UP (ref 70–99)
HCT VFR BLD CALC: 45.2 % — SIGNIFICANT CHANGE UP (ref 39–50)
HCT VFR BLD CALC: 45.2 % — SIGNIFICANT CHANGE UP (ref 39–50)
HGB BLD-MCNC: 14.9 G/DL — SIGNIFICANT CHANGE UP (ref 13–17)
HGB BLD-MCNC: 14.9 G/DL — SIGNIFICANT CHANGE UP (ref 13–17)
INR BLD: 2.04 RATIO — HIGH (ref 0.85–1.18)
INR BLD: 2.04 RATIO — HIGH (ref 0.85–1.18)
MCHC RBC-ENTMCNC: 31 PG — SIGNIFICANT CHANGE UP (ref 27–34)
MCHC RBC-ENTMCNC: 31 PG — SIGNIFICANT CHANGE UP (ref 27–34)
MCHC RBC-ENTMCNC: 33 GM/DL — SIGNIFICANT CHANGE UP (ref 32–36)
MCHC RBC-ENTMCNC: 33 GM/DL — SIGNIFICANT CHANGE UP (ref 32–36)
MCV RBC AUTO: 94 FL — SIGNIFICANT CHANGE UP (ref 80–100)
MCV RBC AUTO: 94 FL — SIGNIFICANT CHANGE UP (ref 80–100)
NRBC # BLD: 0 /100 WBCS — SIGNIFICANT CHANGE UP (ref 0–0)
NRBC # BLD: 0 /100 WBCS — SIGNIFICANT CHANGE UP (ref 0–0)
PLATELET # BLD AUTO: 191 K/UL — SIGNIFICANT CHANGE UP (ref 150–400)
PLATELET # BLD AUTO: 191 K/UL — SIGNIFICANT CHANGE UP (ref 150–400)
POTASSIUM SERPL-MCNC: 2.9 MMOL/L — CRITICAL LOW (ref 3.5–5.3)
POTASSIUM SERPL-MCNC: 2.9 MMOL/L — CRITICAL LOW (ref 3.5–5.3)
POTASSIUM SERPL-SCNC: 2.9 MMOL/L — CRITICAL LOW (ref 3.5–5.3)
POTASSIUM SERPL-SCNC: 2.9 MMOL/L — CRITICAL LOW (ref 3.5–5.3)
PROTHROM AB SERPL-ACNC: 22.8 SEC — HIGH (ref 9.5–13)
PROTHROM AB SERPL-ACNC: 22.8 SEC — HIGH (ref 9.5–13)
RAPID RVP RESULT: SIGNIFICANT CHANGE UP
RAPID RVP RESULT: SIGNIFICANT CHANGE UP
RBC # BLD: 4.81 M/UL — SIGNIFICANT CHANGE UP (ref 4.2–5.8)
RBC # BLD: 4.81 M/UL — SIGNIFICANT CHANGE UP (ref 4.2–5.8)
RBC # FLD: 13.7 % — SIGNIFICANT CHANGE UP (ref 10.3–14.5)
RBC # FLD: 13.7 % — SIGNIFICANT CHANGE UP (ref 10.3–14.5)
SARS-COV-2 RNA SPEC QL NAA+PROBE: SIGNIFICANT CHANGE UP
SARS-COV-2 RNA SPEC QL NAA+PROBE: SIGNIFICANT CHANGE UP
SODIUM SERPL-SCNC: 139 MMOL/L — SIGNIFICANT CHANGE UP (ref 135–145)
SODIUM SERPL-SCNC: 139 MMOL/L — SIGNIFICANT CHANGE UP (ref 135–145)
WBC # BLD: 7.89 K/UL — SIGNIFICANT CHANGE UP (ref 3.8–10.5)
WBC # BLD: 7.89 K/UL — SIGNIFICANT CHANGE UP (ref 3.8–10.5)
WBC # FLD AUTO: 7.89 K/UL — SIGNIFICANT CHANGE UP (ref 3.8–10.5)
WBC # FLD AUTO: 7.89 K/UL — SIGNIFICANT CHANGE UP (ref 3.8–10.5)

## 2024-01-02 PROCEDURE — 93289 INTERROG DEVICE EVAL HEART: CPT | Mod: 26

## 2024-01-02 RX ORDER — POTASSIUM CHLORIDE 20 MEQ
40 PACKET (EA) ORAL EVERY 4 HOURS
Refills: 0 | Status: COMPLETED | OUTPATIENT
Start: 2024-01-02 | End: 2024-01-03

## 2024-01-02 RX ORDER — POTASSIUM CHLORIDE 20 MEQ
10 PACKET (EA) ORAL
Refills: 0 | Status: COMPLETED | OUTPATIENT
Start: 2024-01-02 | End: 2024-01-02

## 2024-01-02 RX ADMIN — SACUBITRIL AND VALSARTAN 1 TABLET(S): 24; 26 TABLET, FILM COATED ORAL at 17:37

## 2024-01-02 RX ADMIN — Medication 100 MILLIEQUIVALENT(S): at 14:50

## 2024-01-02 RX ADMIN — HEPARIN SODIUM 600 UNIT(S)/HR: 5000 INJECTION INTRAVENOUS; SUBCUTANEOUS at 20:59

## 2024-01-02 RX ADMIN — Medication 1 SPRAY(S): at 05:50

## 2024-01-02 RX ADMIN — Medication 12.5 MILLIGRAM(S): at 17:37

## 2024-01-02 RX ADMIN — Medication 40 MILLIEQUIVALENT(S): at 22:00

## 2024-01-02 RX ADMIN — Medication 100 MILLIEQUIVALENT(S): at 15:54

## 2024-01-02 RX ADMIN — Medication 40 MILLIEQUIVALENT(S): at 17:36

## 2024-01-02 RX ADMIN — HEPARIN SODIUM 600 UNIT(S)/HR: 5000 INJECTION INTRAVENOUS; SUBCUTANEOUS at 07:34

## 2024-01-02 RX ADMIN — SACUBITRIL AND VALSARTAN 1 TABLET(S): 24; 26 TABLET, FILM COATED ORAL at 05:50

## 2024-01-02 RX ADMIN — Medication 1200 MILLIGRAM(S): at 05:50

## 2024-01-02 RX ADMIN — Medication 3 MILLILITER(S): at 09:46

## 2024-01-02 RX ADMIN — Medication 12.5 MILLIGRAM(S): at 05:50

## 2024-01-02 RX ADMIN — WARFARIN SODIUM 6 MILLIGRAM(S): 2.5 TABLET ORAL at 21:59

## 2024-01-02 RX ADMIN — Medication 1 SPRAY(S): at 17:37

## 2024-01-02 RX ADMIN — Medication 3 MILLILITER(S): at 03:46

## 2024-01-02 RX ADMIN — HEPARIN SODIUM 600 UNIT(S)/HR: 5000 INJECTION INTRAVENOUS; SUBCUTANEOUS at 00:02

## 2024-01-02 RX ADMIN — ATORVASTATIN CALCIUM 20 MILLIGRAM(S): 80 TABLET, FILM COATED ORAL at 21:59

## 2024-01-02 RX ADMIN — Medication 1200 MILLIGRAM(S): at 17:36

## 2024-01-02 RX ADMIN — Medication 3 MILLILITER(S): at 20:09

## 2024-01-02 RX ADMIN — HEPARIN SODIUM 600 UNIT(S)/HR: 5000 INJECTION INTRAVENOUS; SUBCUTANEOUS at 07:00

## 2024-01-02 RX ADMIN — HEPARIN SODIUM 400 UNIT(S)/HR: 5000 INJECTION INTRAVENOUS; SUBCUTANEOUS at 19:31

## 2024-01-02 RX ADMIN — Medication 100 MILLIEQUIVALENT(S): at 17:39

## 2024-01-02 RX ADMIN — HEPARIN SODIUM 400 UNIT(S)/HR: 5000 INJECTION INTRAVENOUS; SUBCUTANEOUS at 13:54

## 2024-01-02 NOTE — PROGRESS NOTE ADULT - SUBJECTIVE AND OBJECTIVE BOX
Patient is a 78y old  Male who presents with a chief complaint of Syncope (02 Jan 2024 08:38)  Awake, alert, comfortable in bed in NAD    INTERVAL HPI/OVERNIGHT EVENTS:      VITAL SIGNS:  T(F): 98.4 (01-02-24 @ 07:35)  HR: 60 (01-02-24 @ 07:35)  BP: 113/58 (01-02-24 @ 07:35)  RR: 18 (01-02-24 @ 07:35)  SpO2: 96% (01-02-24 @ 07:35)  Wt(kg): --  I&O's Detail    01 Jan 2024 07:01  -  02 Jan 2024 07:00  --------------------------------------------------------  IN:    Heparin Infusion: 84 mL  Total IN: 84 mL    OUT:  Total OUT: 0 mL    Total NET: 84 mL              REVIEW OF SYSTEMS:    CONSTITUTIONAL:  No fevers, chills, sweats    HEENT:  Eyes:  No diplopia or blurred vision. ENT:  No earache, sore throat or runny nose.    CARDIOVASCULAR:  No pressure, squeezing, tightness, or heaviness about the chest; no palpitations.    RESPIRATORY:  Per HPI    GASTROINTESTINAL:  No abdominal pain, nausea, vomiting or diarrhea.    GENITOURINARY:  No dysuria, frequency or urgency.    NEUROLOGIC:  No paresthesias, fasciculations, seizures or weakness.    PSYCHIATRIC:  No disorder of thought or mood.      PHYSICAL EXAM:    Constitutional: Well developed and nourished  Eyes:Perrla  ENMT: normal  Neck:supple  Respiratory: Rhonchi posteriorly  Cardiovascular: S1 S2 regular  Gastrointestinal: Soft, Non tender  Extremities: No edema  Vascular:normal  Neurological:Awake, alert,Ox3  Musculoskeletal:Normal      MEDICATIONS  (STANDING):  albuterol/ipratropium for Nebulization 3 milliLiter(s) Nebulizer every 6 hours  atorvastatin 20 milliGRAM(s) Oral at bedtime  fluticasone propionate 50 MICROgram(s)/spray Nasal Spray 1 Spray(s) Both Nostrils two times a day  guaiFENesin ER 1200 milliGRAM(s) Oral every 12 hours  heparin  Infusion. 1000 Unit(s)/Hr (10 mL/Hr) IV Continuous <Continuous>  insulin lispro (ADMELOG) corrective regimen sliding scale   SubCutaneous Before meals and at bedtime  levoFLOXacin  Tablet 500 milliGRAM(s) Oral every 24 hours  metoprolol tartrate 12.5 milliGRAM(s) Oral two times a day  sacubitril 24 mG/valsartan 26 mG 1 Tablet(s) Oral two times a day  warfarin 6 milliGRAM(s) Oral daily    MEDICATIONS  (PRN):  acetaminophen     Tablet .. 650 milliGRAM(s) Oral every 6 hours PRN Temp greater or equal to 38C (100.4F), Mild Pain (1 - 3)  ondansetron Injectable 4 milliGRAM(s) IV Push every 8 hours PRN Nausea and/or Vomiting  oxyCODONE    IR 5 milliGRAM(s) Oral every 6 hours PRN Moderate Pain (4 - 6)      Allergies    No Known Allergies    Intolerances        LABS:                        14.9   7.89  )-----------( 191      ( 02 Jan 2024 05:25 )             45.2     01-01    136  |  101  |  23<H>  ----------------------------<  90  3.3<L>   |  28  |  1.32<H>    Ca    8.2<L>      01 Jan 2024 05:02  Phos  2.8     01-01  Mg     1.5     01-01      PT/INR - ( 02 Jan 2024 05:25 )   PT: 22.8 sec;   INR: 2.04 ratio         PTT - ( 02 Jan 2024 05:25 )  PTT:73.9 sec  Urinalysis Basic - ( 01 Jan 2024 05:02 )    Color: x / Appearance: x / SG: x / pH: x  Gluc: 90 mg/dL / Ketone: x  / Bili: x / Urobili: x   Blood: x / Protein: x / Nitrite: x   Leuk Esterase: x / RBC: x / WBC x   Sq Epi: x / Non Sq Epi: x / Bacteria: x            CAPILLARY BLOOD GLUCOSE      POCT Blood Glucose.: 101 mg/dL (02 Jan 2024 07:58)  POCT Blood Glucose.: 183 mg/dL (01 Jan 2024 21:09)  POCT Blood Glucose.: 138 mg/dL (01 Jan 2024 16:50)  POCT Blood Glucose.: 107 mg/dL (01 Jan 2024 11:34)        RADIOLOGY & ADDITIONAL TESTS:  < from: CT Chest No Cont (12.30.23 @ 20:43) >  IMPRESSION:    Evaluation of the thoracic organs/vasculature is limited without   intravenous contrast. Acute right-sided rib fractures. No obvious   pneumothorax.    Bibasilar atelectasis/scarring. Findings suggestive of mucus plugging at   the lung bases.    Additional findings as described.      < end of copied text >    CXR:    Ct scan chest:    ekg;    echo:

## 2024-01-02 NOTE — PROGRESS NOTE ADULT - ASSESSMENT
Patient is a 79 y/o M with PMH of DM, HTN, HLD, PAF, CHF, PPM and defibrillator who presented after an episode of syncope. Patient also complains of severe cough for the past 4 days. Patient is being admitted for further management and work up of syncope.   evaluated by cardiology, pending Echo results. continues on heparin gtt and coumadin for mechanical valve. f/u repeat RVP. pt eval no needs. spoke with daughter regarding plan of care.  Patient is a 77 y/o M with PMH of DM, HTN, HLD, PAF, CHF, PPM and defibrillator who presented after an episode of syncope. Patient also complains of severe cough for the past 4 days. Patient is being admitted for further management and work up of syncope.   evaluated by cardiology, pending Echo results. continues on heparin gtt and coumadin for mechanical valve. f/u repeat RVP. pt eval no needs. spoke with daughter regarding plan of care.

## 2024-01-02 NOTE — PROGRESS NOTE ADULT - PROBLEM SELECTOR PLAN 5
C/w warfarin and metoprolol.   Daily INRs. C/w metoprolol.   on warfarin at home   on heparin gtt/warfarin for mechanical valve   Daily INRs.

## 2024-01-02 NOTE — PROGRESS NOTE ADULT - PROBLEM SELECTOR PLAN 3
CT chest: Evaluation of the thoracic organs/vasculature is limited without intravenous contrast. Acute right-sided rib fractures.   Tylenol - mild pain.   Oxycodone - moderate pain.   Lidocaine patch. CT chest: Evaluation of the thoracic organs/vasculature is limited without intravenous contrast. Acute right-sided rib fractures.   Tylenol - mild pain.   Oxycodone - moderate pain.   Lidocaine patch.  incentive spirometer

## 2024-01-02 NOTE — PROGRESS NOTE ADULT - PROBLEM SELECTOR PLAN 2
P/w cough and bilateral wheeze.   RVP negative x 2  CT HEAD: Moderate anterior periventricular white matter ischemia.    Mild global atrophy.  Moderate mucosal thickening in the BILATERAL ethmoid sinuses and secretions in the RIGHT sphenoid sinus.  CT chest: Evaluation of the thoracic organs/vasculature is limited without intravenous contrast. Acute right-sided rib fractures. No obvious pneumothorax. Bibasilar atelectasis/scarring. Findings suggestive of mucus plugging at the lung bases.   Start flonase.   Start levaquin.   Start mucinex. P/w cough and bilateral wheeze.   RVP negative x 2  CT HEAD: Moderate anterior periventricular white matter ischemia.    Mild global atrophy.  Moderate mucosal thickening in the BILATERAL ethmoid sinuses and secretions in the RIGHT sphenoid sinus.  CT chest: Evaluation of the thoracic organs/vasculature is limited without intravenous contrast. Acute right-sided rib fractures. No obvious pneumothorax. Bibasilar atelectasis/scarring. Findings suggestive of mucus plugging at the lung bases.   C/w: Flonase, Levaquin, Mucinex.

## 2024-01-02 NOTE — PROGRESS NOTE ADULT - PROBLEM SELECTOR PLAN 6
Hold entresto, indpamide, in setting of BEVERLY.  F/U echo. Hold Entresto, indapamide, in setting of BEVERLY.  F/U echo.

## 2024-01-02 NOTE — PROGRESS NOTE ADULT - PROBLEM SELECTOR PLAN 4
No chest pain.   Trop 368>406.   Follow until downtrends.   EKG - AV dual paced rhythm.   F/U echo.   Consulted cardio - Dr. Toure. No chest pain.   Trop 368>406>377   Follow until downtrends.   EKG - AV dual paced rhythm.   F/U echo.   c/w Heparin gtt/coumadin   Cards [place] following

## 2024-01-02 NOTE — PHYSICAL THERAPY INITIAL EVALUATION ADULT - GENERAL OBSERVATIONS, REHAB EVAL
Consult received,EMR, radiology and labs reviewed. Patient received OOB in a chair, English and Citizen of [place] speaking- on heparin drip.  Patient agreed to EVALUATION from Physical Therapist. Consult received,EMR, radiology and labs reviewed. Patient received OOB in a chair, English and [place] speaking- on heparin drip.  Patient agreed to EVALUATION from Physical Therapist.

## 2024-01-02 NOTE — PROGRESS NOTE ADULT - PROBLEM SELECTOR PLAN 1
P/w syncope with severe cough.   RVP negative x 2   CT HEAD: Moderate anterior periventricular white matter ischemia.    Mild global atrophy.  Moderate mucosal thickening in the BILATERAL ethmoid sinuses and secretions in the RIGHT sphenoid sinus.  +orthostatic   c/w tele    F/U echo.   Consulted cardiology - Dr. Toure.   Consulted neurology - Dr. Coffey.-no reccs

## 2024-01-02 NOTE — PROGRESS NOTE ADULT - SUBJECTIVE AND OBJECTIVE BOX
Date of Service 01-02-24 @ 08:38    CHIEF COMPLAINT:Patient is a 78y old  Male who presents with a chief complaint of Syncope.Pt appears comfortable.    	  REVIEW OF SYSTEMS:  CONSTITUTIONAL: No fever, weight loss, or fatigue  EYES: No eye pain, visual disturbances, or discharge  ENT:  No difficulty hearing, tinnitus, vertigo; No sinus or throat pain  NECK: No pain or stiffness  RESPIRATORY: No cough, wheezing, chills or hemoptysis; No Shortness of Breath  CARDIOVASCULAR: No chest pain, palpitations, passing out, dizziness, or leg swelling  GASTROINTESTINAL: No abdominal or epigastric pain. No nausea, vomiting, or hematemesis; No diarrhea or constipation. No melena or hematochezia.  GENITOURINARY: No dysuria, frequency, hematuria, or incontinence  NEUROLOGICAL: No headaches, memory loss, loss of strength, numbness, or tremors  SKIN: No itching, burning, rashes, or lesions   LYMPH Nodes: No enlarged glands  ENDOCRINE: No heat or cold intolerance; No hair loss  MUSCULOSKELETAL: No joint pain or swelling; No muscle, back, or extremity pain  PSYCHIATRIC: No depression, anxiety, mood swings, or difficulty sleeping  HEME/LYMPH: No easy bruising, or bleeding gums  ALLERGY AND IMMUNOLOGIC: No hives or eczema	      PHYSICAL EXAM:  T(C): 36.9 (01-02-24 @ 07:35), Max: 36.9 (01-01-24 @ 11:23)  HR: 60 (01-02-24 @ 07:35) (59 - 66)  BP: 113/58 (01-02-24 @ 07:35) (113/58 - 160/61)  RR: 18 (01-02-24 @ 07:35) (17 - 18)  SpO2: 96% (01-02-24 @ 07:35) (92% - 96%)  Wt(kg): --  I&O's Summary    01 Jan 2024 07:01  -  02 Jan 2024 07:00  --------------------------------------------------------  IN: 84 mL / OUT: 0 mL / NET: 84 mL        Appearance: Normal	  HEENT:   Normal oral mucosa, PERRL, EOMI	  Lymphatic: No lymphadenopathy  Cardiovascular: Normal S1 S2, +click  Respiratory: Lungs clear to auscultation	  Psychiatry: A & O x 3, Mood & affect appropriate  Gastrointestinal:  Soft, Non-tender, + BS	  Skin: No rashes, No ecchymoses, No cyanosis	  Neurologic: Non-focal  Extremities: Normal range of motion, No clubbing, cyanosis or edema  Vascular: Peripheral pulses palpable 2+ bilaterally    MEDICATIONS  (STANDING):  albuterol/ipratropium for Nebulization 3 milliLiter(s) Nebulizer every 6 hours  atorvastatin 20 milliGRAM(s) Oral at bedtime  fluticasone propionate 50 MICROgram(s)/spray Nasal Spray 1 Spray(s) Both Nostrils two times a day  guaiFENesin ER 1200 milliGRAM(s) Oral every 12 hours  heparin  Infusion. 1000 Unit(s)/Hr (10 mL/Hr) IV Continuous <Continuous>  insulin lispro (ADMELOG) corrective regimen sliding scale   SubCutaneous Before meals and at bedtime  levoFLOXacin  Tablet 500 milliGRAM(s) Oral every 24 hours  metoprolol tartrate 12.5 milliGRAM(s) Oral two times a day  sacubitril 24 mG/valsartan 26 mG 1 Tablet(s) Oral two times a day  warfarin 6 milliGRAM(s) Oral daily      TELEMETRY: 	paced    	  	  LABS:	 	      Troponin I, High Sensitivity Result: 377.7 ng/L (12-31 @ 05:45)  Troponin I, High Sensitivity Result: 407.7 ng/L (12-31 @ 00:12)  Troponin I, High Sensitivity Result: 406.0 ng/L (12-30 @ 19:50)  Troponin I, High Sensitivity Result: 368.0 ng/L (12-30 @ 17:50)                            14.9   7.89  )-----------( 191      ( 02 Jan 2024 05:25 )             45.2     01-01    136  |  101  |  23<H>  ----------------------------<  90  3.3<L>   |  28  |  1.32<H>    Ca    8.2<L>      01 Jan 2024 05:02  Phos  2.8     01-01  Mg     1.5     01-01      proBNP:   Lipid Profile: Cholesterol 132  LDL --  HDL 47    Ldl calc 59    	    PT/INR - ( 02 Jan 2024 05:25 )   PT: 22.8 sec;   INR: 2.04 ratio         PTT - ( 02 Jan 2024 05:25 )  PTT:73.9 sec

## 2024-01-02 NOTE — PROGRESS NOTE ADULT - ASSESSMENT
77 y/o M with PMH of DM, HTN, HLD, PAF, CHF, S/P defibrillator,S/P CABG and AVR  who presented after an episode of syncope,acute lt sided rib fx,sinusitis,BEVERLY.  1.Tele monitoring.  2.Interrogate AICD.  3.Echocardiogram.  4.PAF,?mech AVR-coumadin and heparin drip..  5.Replace k+.  6.CHF-b blocker.Entresto.  7.CAD-statin,lopressor.  8.?BEVERLY-resolving.

## 2024-01-02 NOTE — PROGRESS NOTE ADULT - SUBJECTIVE AND OBJECTIVE BOX
Patient is a 78y old  Male who presents with a chief complaint of Syncope (01 Jan 2024 09:45)    pt seen in ed tele [ x ], reg med floor [   ], bed [ x ], chair at bedside [   ], awake and responsive [ x ],   lethargic [  ],  nad [ x ]        Allergies    No Known Allergies        Vitals    T(F): 98.1 (01-02-24 @ 04:40), Max: 98.4 (01-01-24 @ 11:23)  HR: 60 (01-02-24 @ 04:40) (59 - 66)  BP: 137/64 (01-02-24 @ 04:40) (116/75 - 160/61)  RR: 18 (01-02-24 @ 04:40) (17 - 18)  SpO2: 94% (01-02-24 @ 04:40) (92% - 94%)  Wt(kg): --  CAPILLARY BLOOD GLUCOSE      POCT Blood Glucose.: 183 mg/dL (01 Jan 2024 21:09)      Labs                          14.9   7.89  )-----------( 191      ( 02 Jan 2024 05:25 )             45.2       01-01    136  |  101  |  23<H>  ----------------------------<  90  3.3<L>   |  28  |  1.32<H>    Ca    8.2<L>      01 Jan 2024 05:02  Phos  2.8     01-01  Mg     1.5     01-01            Troponin I, High Sensitivity Result: 377.7 ng/L (12-31-23 @ 05:45)  Troponin I, High Sensitivity Result: 407.7 ng/L (12-31-23 @ 00:12)  Troponin I, High Sensitivity Result: 406.0 ng/L (12-30-23 @ 19:50)  Troponin I, High Sensitivity Result: 368.0 ng/L (12-30-23 @ 17:50)        Radiology Results      Meds    MEDICATIONS  (STANDING):  albuterol/ipratropium for Nebulization 3 milliLiter(s) Nebulizer every 6 hours  atorvastatin 20 milliGRAM(s) Oral at bedtime  fluticasone propionate 50 MICROgram(s)/spray Nasal Spray 1 Spray(s) Both Nostrils two times a day  guaiFENesin ER 1200 milliGRAM(s) Oral every 12 hours  heparin  Infusion. 1000 Unit(s)/Hr (10 mL/Hr) IV Continuous <Continuous>  insulin lispro (ADMELOG) corrective regimen sliding scale   SubCutaneous Before meals and at bedtime  levoFLOXacin  Tablet 500 milliGRAM(s) Oral every 24 hours  metoprolol tartrate 12.5 milliGRAM(s) Oral two times a day  sacubitril 24 mG/valsartan 26 mG 1 Tablet(s) Oral two times a day  warfarin 6 milliGRAM(s) Oral daily      MEDICATIONS  (PRN):  acetaminophen     Tablet .. 650 milliGRAM(s) Oral every 6 hours PRN Temp greater or equal to 38C (100.4F), Mild Pain (1 - 3)  ondansetron Injectable 4 milliGRAM(s) IV Push every 8 hours PRN Nausea and/or Vomiting  oxyCODONE    IR 5 milliGRAM(s) Oral every 6 hours PRN Moderate Pain (4 - 6)      Physical Exam    Neuro :  no focal deficits  Respiratory: CTA B/L  CV: RRR, S1S2, no murmurs,   Abdominal: Soft, NT, ND +BS,  Extremities: No edema, + peripheral pulses    ASSESSMENT    syncope pos 2nd to severe cough,   r/o cns patho,   troponinemia,   r/o acs,   right rib pain 2nd to fx,   s/p fall,   sinusitis,   cindy   h/o htn,   chf,   ppm,   dm,   PAF   aortic valve replacement (? mechanical)      PLAN    cont tele,   aspirin, statin,   neuro cons noted   Syncope in patient with dizziness and +jose orthostatics cw orthostatic syncope.  orthostatic bp q shift   trop x3 positive noted above   cont coumadin   inr subtherapeutic noted above   goal inr 2.5 to 3.5   start hepp drip as per hospital normogram  entresto on hold 2nd to cindy    cardio f/u   Interrogate AICD.  statin,lopressor.  f/u echo   pulm f/u    robitussin,   albuterol,   flonase nasal spray,   levaquin, 500mg daily,   gentle hydration   lispro ss,   hgba1c 6.2 noted above  phys tx eval  cont current meds        Patient is a 78y old  Male who presents with a chief complaint of Syncope (01 Jan 2024 09:45)    pt seen in ed tele [ x ], reg med floor [   ], bed [ x ], chair at bedside [   ], a+ o x3 [ x ],   lethargic [  ],  nad [ x ]        Allergies    No Known Allergies        Vitals    T(F): 98.1 (01-02-24 @ 04:40), Max: 98.4 (01-01-24 @ 11:23)  HR: 60 (01-02-24 @ 04:40) (59 - 66)  BP: 137/64 (01-02-24 @ 04:40) (116/75 - 160/61)  RR: 18 (01-02-24 @ 04:40) (17 - 18)  SpO2: 94% (01-02-24 @ 04:40) (92% - 94%)  Wt(kg): --  CAPILLARY BLOOD GLUCOSE      POCT Blood Glucose.: 183 mg/dL (01 Jan 2024 21:09)      Labs                          14.9   7.89  )-----------( 191      ( 02 Jan 2024 05:25 )             45.2       01-01    136  |  101  |  23<H>  ----------------------------<  90  3.3<L>   |  28  |  1.32<H>    Ca    8.2<L>      01 Jan 2024 05:02  Phos  2.8     01-01  Mg     1.5     01-01      Prothrombin Time and INR, Plasma in AM (01.02.24 @ 05:25)   Prothrombin Time, Plasma: 22.8 sec  INR: 2.04:    Activated Partial Thromboplastin Time (01.02.24 @ 05:25)   Activated Partial Thromboplastin Time: 73.9:         Troponin I, High Sensitivity Result: 377.7 ng/L (12-31-23 @ 05:45)  Troponin I, High Sensitivity Result: 407.7 ng/L (12-31-23 @ 00:12)  Troponin I, High Sensitivity Result: 406.0 ng/L (12-30-23 @ 19:50)  Troponin I, High Sensitivity Result: 368.0 ng/L (12-30-23 @ 17:50)        Radiology Results      Meds    MEDICATIONS  (STANDING):  albuterol/ipratropium for Nebulization 3 milliLiter(s) Nebulizer every 6 hours  atorvastatin 20 milliGRAM(s) Oral at bedtime  fluticasone propionate 50 MICROgram(s)/spray Nasal Spray 1 Spray(s) Both Nostrils two times a day  guaiFENesin ER 1200 milliGRAM(s) Oral every 12 hours  heparin  Infusion. 1000 Unit(s)/Hr (10 mL/Hr) IV Continuous <Continuous>  insulin lispro (ADMELOG) corrective regimen sliding scale   SubCutaneous Before meals and at bedtime  levoFLOXacin  Tablet 500 milliGRAM(s) Oral every 24 hours  metoprolol tartrate 12.5 milliGRAM(s) Oral two times a day  sacubitril 24 mG/valsartan 26 mG 1 Tablet(s) Oral two times a day  warfarin 6 milliGRAM(s) Oral daily      MEDICATIONS  (PRN):  acetaminophen     Tablet .. 650 milliGRAM(s) Oral every 6 hours PRN Temp greater or equal to 38C (100.4F), Mild Pain (1 - 3)  ondansetron Injectable 4 milliGRAM(s) IV Push every 8 hours PRN Nausea and/or Vomiting  oxyCODONE    IR 5 milliGRAM(s) Oral every 6 hours PRN Moderate Pain (4 - 6)      Physical Exam    Neuro :  no focal deficits  Respiratory: CTA B/L  CV: RRR, S1S2, no murmurs,   Abdominal: Soft, NT, ND +BS,  Extremities: No edema, + peripheral pulses    ASSESSMENT    syncope pos 2nd to severe cough,   cns patho r/o,   troponinemia,   r/o acs,   right rib pain 2nd to fx,   s/p fall,   sinusitis,   cindy   h/o htn,   chf,   ppm,   dm,   PAF   aortic valve replacement (? mechanical)      PLAN    cont tele,   aspirin, statin,   neuro cons noted   Syncope in patient with dizziness and +jose orthostatics cw orthostatic syncope.  orthostatic bp q shift   trop x 4 positive noted above   cont coumadin   inr subtherapeutic noted above   goal inr 2.5 to 3.5   start hep drip as per hospital normogram  cardio f/u   resumed Entresto   Interrogate AICD.  cont statin, lopressor.  f/u echo   pulm f/u    robitussin,   albuterol,   flonase nasal spray,   levaquin, 500mg daily, to complete 7 days   f/u bmp   lispro ss,   hgba1c 6.2 noted    phys tx eval  cont current meds

## 2024-01-02 NOTE — PROGRESS NOTE ADULT - SUBJECTIVE AND OBJECTIVE BOX
NP Note discussed with  Primary Attending    Patient is a 78y old  Male who presents with a chief complaint of Syncope (02 Jan 2024 08:38)      INTERVAL HPI/OVERNIGHT EVENTS: no new complaints    MEDICATIONS  (STANDING):  albuterol/ipratropium for Nebulization 3 milliLiter(s) Nebulizer every 6 hours  atorvastatin 20 milliGRAM(s) Oral at bedtime  fluticasone propionate 50 MICROgram(s)/spray Nasal Spray 1 Spray(s) Both Nostrils two times a day  guaiFENesin ER 1200 milliGRAM(s) Oral every 12 hours  heparin  Infusion. 1000 Unit(s)/Hr (10 mL/Hr) IV Continuous <Continuous>  insulin lispro (ADMELOG) corrective regimen sliding scale   SubCutaneous Before meals and at bedtime  levoFLOXacin  Tablet 500 milliGRAM(s) Oral every 24 hours  metoprolol tartrate 12.5 milliGRAM(s) Oral two times a day  sacubitril 24 mG/valsartan 26 mG 1 Tablet(s) Oral two times a day  warfarin 6 milliGRAM(s) Oral daily    MEDICATIONS  (PRN):  acetaminophen     Tablet .. 650 milliGRAM(s) Oral every 6 hours PRN Temp greater or equal to 38C (100.4F), Mild Pain (1 - 3)  ondansetron Injectable 4 milliGRAM(s) IV Push every 8 hours PRN Nausea and/or Vomiting  oxyCODONE    IR 5 milliGRAM(s) Oral every 6 hours PRN Moderate Pain (4 - 6)      __________________________________________________  REVIEW OF SYSTEMS:    CONSTITUTIONAL: No fever,   EYES: no acute visual disturbances  NECK: No pain or stiffness  RESPIRATORY: No cough; No shortness of breath  CARDIOVASCULAR: No chest pain, no palpitations  GASTROINTESTINAL: No pain. No nausea or vomiting; No diarrhea   NEUROLOGICAL: No headache or numbness, no tremors  MUSCULOSKELETAL: No joint pain, no muscle pain  GENITOURINARY: no dysuria, no frequency, no hesitancy  PSYCHIATRY: no depression , no anxiety  ALL OTHER  ROS negative        Vital Signs Last 24 Hrs  T(C): 36.9 (02 Jan 2024 07:35), Max: 36.9 (01 Jan 2024 11:23)  T(F): 98.4 (02 Jan 2024 07:35), Max: 98.4 (01 Jan 2024 11:23)  HR: 60 (02 Jan 2024 07:35) (59 - 66)  BP: 113/58 (02 Jan 2024 07:35) (113/58 - 160/61)  BP(mean): 87 (02 Jan 2024 04:40) (87 - 88)  RR: 18 (02 Jan 2024 07:35) (17 - 18)  SpO2: 96% (02 Jan 2024 07:35) (92% - 96%)    Parameters below as of 02 Jan 2024 07:35  Patient On (Oxygen Delivery Method): room air        ________________________________________________  PHYSICAL EXAM:  GENERAL: NAD  HEENT: Normocephalic;  conjunctivae and sclerae clear; moist mucous membranes;   NECK : supple  CHEST/LUNG: Clear to auscultation bilaterally with good air entry   HEART: S1 S2  regular; no murmurs, gallops or rubs  ABDOMEN: Soft, Nontender, Nondistended; Bowel sounds present  EXTREMITIES: no cyanosis; no edema; no calf tenderness  SKIN: warm and dry; no rash  NERVOUS SYSTEM:  Awake and alert; Oriented  to place, person and time ; no new deficits    _________________________________________________  LABS:                        14.9   7.89  )-----------( 191      ( 02 Jan 2024 05:25 )             45.2     01-01    136  |  101  |  23<H>  ----------------------------<  90  3.3<L>   |  28  |  1.32<H>    Ca    8.2<L>      01 Jan 2024 05:02  Phos  2.8     01-01  Mg     1.5     01-01      PT/INR - ( 02 Jan 2024 05:25 )   PT: 22.8 sec;   INR: 2.04 ratio         PTT - ( 02 Jan 2024 05:25 )  PTT:73.9 sec  Urinalysis Basic - ( 01 Jan 2024 05:02 )    Color: x / Appearance: x / SG: x / pH: x  Gluc: 90 mg/dL / Ketone: x  / Bili: x / Urobili: x   Blood: x / Protein: x / Nitrite: x   Leuk Esterase: x / RBC: x / WBC x   Sq Epi: x / Non Sq Epi: x / Bacteria: x      CAPILLARY BLOOD GLUCOSE      POCT Blood Glucose.: 101 mg/dL (02 Jan 2024 07:58)  POCT Blood Glucose.: 183 mg/dL (01 Jan 2024 21:09)  POCT Blood Glucose.: 138 mg/dL (01 Jan 2024 16:50)  POCT Blood Glucose.: 107 mg/dL (01 Jan 2024 11:34)        RADIOLOGY & ADDITIONAL TESTS:    Imaging  Reviewed:  YES/NO    Consultant(s) Notes Reviewed:   YES/ No      Plan of care was discussed with patient and /or primary care giver; all questions and concerns were addressed  NP Note discussed with  Primary Attending    Patient is a 78y old  Male who presents with a chief complaint of Syncope (02 Jan 2024 08:38)      INTERVAL HPI/OVERNIGHT EVENTS: no new complaints    MEDICATIONS  (STANDING):  albuterol/ipratropium for Nebulization 3 milliLiter(s) Nebulizer every 6 hours  atorvastatin 20 milliGRAM(s) Oral at bedtime  fluticasone propionate 50 MICROgram(s)/spray Nasal Spray 1 Spray(s) Both Nostrils two times a day  guaiFENesin ER 1200 milliGRAM(s) Oral every 12 hours  heparin  Infusion. 1000 Unit(s)/Hr (10 mL/Hr) IV Continuous <Continuous>  insulin lispro (ADMELOG) corrective regimen sliding scale   SubCutaneous Before meals and at bedtime  levoFLOXacin  Tablet 500 milliGRAM(s) Oral every 24 hours  metoprolol tartrate 12.5 milliGRAM(s) Oral two times a day  sacubitril 24 mG/valsartan 26 mG 1 Tablet(s) Oral two times a day  warfarin 6 milliGRAM(s) Oral daily    MEDICATIONS  (PRN):  acetaminophen     Tablet .. 650 milliGRAM(s) Oral every 6 hours PRN Temp greater or equal to 38C (100.4F), Mild Pain (1 - 3)  ondansetron Injectable 4 milliGRAM(s) IV Push every 8 hours PRN Nausea and/or Vomiting  oxyCODONE    IR 5 milliGRAM(s) Oral every 6 hours PRN Moderate Pain (4 - 6)      __________________________________________________  REVIEW OF SYSTEMS:    CONSTITUTIONAL: No fever,   EYES: no acute visual disturbances  NECK: No pain or stiffness  RESPIRATORY: +cough; No shortness of breath  CARDIOVASCULAR: No chest pain, no palpitations  GASTROINTESTINAL: No pain. No nausea or vomiting; No diarrhea   NEUROLOGICAL: No headache or numbness, no tremors  MUSCULOSKELETAL: No joint pain, no muscle pain  GENITOURINARY: no dysuria, no frequency, no hesitancy  PSYCHIATRY: no depression , no anxiety  ALL OTHER  ROS negative        Vital Signs Last 24 Hrs  T(C): 36.9 (02 Jan 2024 07:35), Max: 36.9 (01 Jan 2024 11:23)  T(F): 98.4 (02 Jan 2024 07:35), Max: 98.4 (01 Jan 2024 11:23)  HR: 60 (02 Jan 2024 07:35) (59 - 66)  BP: 113/58 (02 Jan 2024 07:35) (113/58 - 160/61)  BP(mean): 87 (02 Jan 2024 04:40) (87 - 88)  RR: 18 (02 Jan 2024 07:35) (17 - 18)  SpO2: 96% (02 Jan 2024 07:35) (92% - 96%)    Parameters below as of 02 Jan 2024 07:35  Patient On (Oxygen Delivery Method): room air        ________________________________________________  PHYSICAL EXAM:  GENERAL: NAD  HEENT: Normocephalic;  conjunctivae and sclerae clear; moist mucous membranes;   NECK : supple  CHEST/LUNG: Clear to auscultation bilaterally with good air entry   HEART: S1 S2  regular; no murmurs, gallops or rubs  ABDOMEN: Soft, Nontender, Nondistended; Bowel sounds present  EXTREMITIES: no cyanosis; no edema; no calf tenderness  SKIN: warm and dry; no rash  NERVOUS SYSTEM:  Awake and alert; Oriented  to place, person and time ; no new deficits    _________________________________________________  LABS:                        14.9   7.89  )-----------( 191      ( 02 Jan 2024 05:25 )             45.2     01-01    136  |  101  |  23<H>  ----------------------------<  90  3.3<L>   |  28  |  1.32<H>    Ca    8.2<L>      01 Jan 2024 05:02  Phos  2.8     01-01  Mg     1.5     01-01      PT/INR - ( 02 Jan 2024 05:25 )   PT: 22.8 sec;   INR: 2.04 ratio         PTT - ( 02 Jan 2024 05:25 )  PTT:73.9 sec  Urinalysis Basic - ( 01 Jan 2024 05:02 )    Color: x / Appearance: x / SG: x / pH: x  Gluc: 90 mg/dL / Ketone: x  / Bili: x / Urobili: x   Blood: x / Protein: x / Nitrite: x   Leuk Esterase: x / RBC: x / WBC x   Sq Epi: x / Non Sq Epi: x / Bacteria: x      CAPILLARY BLOOD GLUCOSE      POCT Blood Glucose.: 101 mg/dL (02 Jan 2024 07:58)  POCT Blood Glucose.: 183 mg/dL (01 Jan 2024 21:09)  POCT Blood Glucose.: 138 mg/dL (01 Jan 2024 16:50)  POCT Blood Glucose.: 107 mg/dL (01 Jan 2024 11:34)        RADIOLOGY & ADDITIONAL TESTS:  ACC: [id number] EXAM:  CT CHEST   ORDERED BY: LOY MENDEZ     PROCEDURE DATE:  12/30/2023          INTERPRETATION:  CLINICAL INDICATION: R posterior rib pain fall syncope   yesterday    PROCEDURE: CT of the chest was performed without intravenous contrast.   Coronal and sagittal reconstruction images were obtained.    CONTRAST/COMPLICATIONS:  IV Contrast: NONE  Oral Contrast: NONE  Complications: None reported at time of study completion    COMPARISON: None.    FINDINGS: Evaluation ofthe thoracic organs/vasculature is limited   without intravenous contrast. Artifact from patient's arms and   respiratory motion degrades images.    LUNGS AND AIRWAYS: Patent central airways. Bronchial thickening with   areas of narrowing at the lungbases, which may be due to mucus plugging.   Linear opacities at the left > right lung base, suggesting   atelectasis/scarring.  PLEURA: No pleural effusion or pneumothorax.  HEART: Normal size. No pericardial effusion. Mitral annular and coronary   desiccation. Aortic valve replacement.  VESSELS: Atherosclerosis. Ectatic distal ascending aorta/ascending aortic   arch (4.0 cm). CABG.  MEDIASTINUM AND YARELI: Subcentimeter lymph nodes.  CHEST WALL AND LOWER NECK: Left-sided AICD/pacemaker. Median sternotomy.  UPPER ABDOMEN: Fatty atrophy of the visualized pancreas. Bilateral renal   cysts. Bilateral perinephric stranding. Nonobstructing 0.2 cm right renal   stone. Colon diverticulosis. Partially visualized visualized duodenal   diverticulum.  BONES: Acute fractures of the right sixth through ninth anterior ribs   (nondisplaced right sixth and seventh and minimally displaced right   eighth and ninth ribs). Corticated, slight deformities of additional   bilateral ribs, suggesting chronic. Degenerative changes of the spine.    IMPRESSION:    Evaluation of the thoracic organs/vasculature is limited without   intravenous contrast. Acute right-sided rib fractures. No obvious   pneumothorax.    Bibasilar atelectasis/scarring. Findings suggestive of mucus plugging at   the lung bases.    Additional findings as described.    --- End of Report ---    KIP PARKS MD; Attending Radiologist  This document has been electronically signed. Dec 30 2023  9:15PM  Imaging  Reviewed:  YES  Consultant(s) Notes Reviewed:   YES       Plan of care was discussed with daughter all questions and concerns were addressed  NP Note discussed with  Primary Attending    Patient is a 78y old  Male who presents with a chief complaint of Syncope (02 Jan 2024 08:38)      INTERVAL HPI/OVERNIGHT EVENTS: no new complaints    MEDICATIONS  (STANDING):  albuterol/ipratropium for Nebulization 3 milliLiter(s) Nebulizer every 6 hours  atorvastatin 20 milliGRAM(s) Oral at bedtime  fluticasone propionate 50 MICROgram(s)/spray Nasal Spray 1 Spray(s) Both Nostrils two times a day  guaiFENesin ER 1200 milliGRAM(s) Oral every 12 hours  heparin  Infusion. 1000 Unit(s)/Hr (10 mL/Hr) IV Continuous <Continuous>  insulin lispro (ADMELOG) corrective regimen sliding scale   SubCutaneous Before meals and at bedtime  levoFLOXacin  Tablet 500 milliGRAM(s) Oral every 24 hours  metoprolol tartrate 12.5 milliGRAM(s) Oral two times a day  sacubitril 24 mG/valsartan 26 mG 1 Tablet(s) Oral two times a day  warfarin 6 milliGRAM(s) Oral daily    MEDICATIONS  (PRN):  acetaminophen     Tablet .. 650 milliGRAM(s) Oral every 6 hours PRN Temp greater or equal to 38C (100.4F), Mild Pain (1 - 3)  ondansetron Injectable 4 milliGRAM(s) IV Push every 8 hours PRN Nausea and/or Vomiting  oxyCODONE    IR 5 milliGRAM(s) Oral every 6 hours PRN Moderate Pain (4 - 6)      __________________________________________________  REVIEW OF SYSTEMS:    CONSTITUTIONAL: No fever,   EYES: no acute visual disturbances  NECK: No pain or stiffness  RESPIRATORY: +cough; No shortness of breath  CARDIOVASCULAR: No chest pain, no palpitations  GASTROINTESTINAL: No pain. No nausea or vomiting; No diarrhea   NEUROLOGICAL: No headache or numbness, no tremors  MUSCULOSKELETAL: No joint pain, no muscle pain  GENITOURINARY: no dysuria, no frequency, no hesitancy  PSYCHIATRY: no depression , no anxiety  ALL OTHER  ROS negative        Vital Signs Last 24 Hrs  T(C): 36.9 (02 Jan 2024 07:35), Max: 36.9 (01 Jan 2024 11:23)  T(F): 98.4 (02 Jan 2024 07:35), Max: 98.4 (01 Jan 2024 11:23)  HR: 60 (02 Jan 2024 07:35) (59 - 66)  BP: 113/58 (02 Jan 2024 07:35) (113/58 - 160/61)  BP(mean): 87 (02 Jan 2024 04:40) (87 - 88)  RR: 18 (02 Jan 2024 07:35) (17 - 18)  SpO2: 96% (02 Jan 2024 07:35) (92% - 96%)    Parameters below as of 02 Jan 2024 07:35  Patient On (Oxygen Delivery Method): room air        ________________________________________________  PHYSICAL EXAM:  GENERAL: NAD  HEENT: Normocephalic;  conjunctivae and sclerae clear; moist mucous membranes;   NECK : supple  CHEST/LUNG: Clear to auscultation bilaterally with good air entry   HEART: S1 S2  regular; no murmurs, gallops or rubs  ABDOMEN: Soft, Nontender, Nondistended; Bowel sounds present  EXTREMITIES: no cyanosis; no edema; no calf tenderness  SKIN: warm and dry; no rash  NERVOUS SYSTEM:  Awake and alert; Oriented  to place, person and time ; no new deficits    _________________________________________________  LABS:                        14.9   7.89  )-----------( 191      ( 02 Jan 2024 05:25 )             45.2     01-01    136  |  101  |  23<H>  ----------------------------<  90  3.3<L>   |  28  |  1.32<H>    Ca    8.2<L>      01 Jan 2024 05:02  Phos  2.8     01-01  Mg     1.5     01-01      PT/INR - ( 02 Jan 2024 05:25 )   PT: 22.8 sec;   INR: 2.04 ratio         PTT - ( 02 Jan 2024 05:25 )  PTT:73.9 sec  Urinalysis Basic - ( 01 Jan 2024 05:02 )    Color: x / Appearance: x / SG: x / pH: x  Gluc: 90 mg/dL / Ketone: x  / Bili: x / Urobili: x   Blood: x / Protein: x / Nitrite: x   Leuk Esterase: x / RBC: x / WBC x   Sq Epi: x / Non Sq Epi: x / Bacteria: x      CAPILLARY BLOOD GLUCOSE      POCT Blood Glucose.: 101 mg/dL (02 Jan 2024 07:58)  POCT Blood Glucose.: 183 mg/dL (01 Jan 2024 21:09)  POCT Blood Glucose.: 138 mg/dL (01 Jan 2024 16:50)  POCT Blood Glucose.: 107 mg/dL (01 Jan 2024 11:34)        RADIOLOGY & ADDITIONAL TESTS:  ACC: [id number] EXAM:  CT CHEST   ORDERED BY: LOY MENDEZ     PROCEDURE DATE:  12/30/2023          INTERPRETATION:  CLINICAL INDICATION: R posterior rib pain fall syncope   yesterday    PROCEDURE: CT of the chest was performed without intravenous contrast.   Coronal and sagittal reconstruction images were obtained.    CONTRAST/COMPLICATIONS:  IV Contrast: NONE  Oral Contrast: NONE  Complications: None reported at time of study completion    COMPARISON: None.    FINDINGS: Evaluation ofthe thoracic organs/vasculature is limited   without intravenous contrast. Artifact from patient's arms and   respiratory motion degrades images.    LUNGS AND AIRWAYS: Patent central airways. Bronchial thickening with   areas of narrowing at the lungbases, which may be due to mucus plugging.   Linear opacities at the left > right lung base, suggesting   atelectasis/scarring.  PLEURA: No pleural effusion or pneumothorax.  HEART: Normal size. No pericardial effusion. Mitral annular and coronary   desiccation. Aortic valve replacement.  VESSELS: Atherosclerosis. Ectatic distal ascending aorta/ascending aortic   arch (4.0 cm). CABG.  MEDIASTINUM AND AYRELI: Subcentimeter lymph nodes.  CHEST WALL AND LOWER NECK: Left-sided AICD/pacemaker. Median sternotomy.  UPPER ABDOMEN: Fatty atrophy of the visualized pancreas. Bilateral renal   cysts. Bilateral perinephric stranding. Nonobstructing 0.2 cm right renal   stone. Colon diverticulosis. Partially visualized visualized duodenal   diverticulum.  BONES: Acute fractures of the right sixth through ninth anterior ribs   (nondisplaced right sixth and seventh and minimally displaced right   eighth and ninth ribs). Corticated, slight deformities of additional   bilateral ribs, suggesting chronic. Degenerative changes of the spine.    IMPRESSION:    Evaluation of the thoracic organs/vasculature is limited without   intravenous contrast. Acute right-sided rib fractures. No obvious   pneumothorax.    Bibasilar atelectasis/scarring. Findings suggestive of mucus plugging at   the lung bases.    Additional findings as described.    --- End of Report ---    KIP PARKS MD; Attending Radiologist  This document has been electronically signed. Dec 30 2023  9:15PM  Imaging  Reviewed:  YES  Consultant(s) Notes Reviewed:   YES       Plan of care was discussed with daughter all questions and concerns were addressed

## 2024-01-02 NOTE — CHART NOTE - NSCHARTNOTEFT_GEN_A_CORE
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[id number])  SN#  [id number]  Implant date: 3/10/2022	     Estimated battery longevity:  11 years    Ventricular Tachy  VF        220 bpm    ATP                        41J, 41J, 41J x6  VT       185 bpm    Scan   [id number]        41J,41J,41J x4    VT-1    150bpm     Monitor Only    Atrial Tachy  ATR Mode switch      170bpm   VDIR    Richard/CRT    Mode: DDDR    Lower rate/UTR: 60/110bpm    Leads:    RA    Sensing: AGC  0.25 mV    Impedance:  690 ohms    Threshold: 0.5V@0.4ms    Intrinsic Amplitude:  4.1 mV     Pacing output / Pulse width:  AUTO 2.4V @0.4 ms     RV    Sensing: AGC 0.6 mV    Impedance:  525 ohms    Threshold:  0.5 V @ 0.4ms     Intrinsic Amplitude:  N/R    Pacing output / Pulse width:  AUTO 2.0V @0.4 ms    LV    Sensing: AGC 1.0 mV    Impedance:  539 ohms    Threshold:  1.1 V @ 0.4ms     Intrinsic Amplitude:  N/R    Pacing output / Pulse width:  AUTO 2.1V @0.4 ms    Shock Impedance  67ohms    Counters (Since April 26, 2022)  A Paced  49%  RV Paced  4%  LVa Paced   97%  LVb Paced 0  AT/AF   < 1%      Episodes: Multiple episodes of NSVT and PMT at rate of 110bmp.  No events on the day of admission.    Changes made: None    Conclusion: Normal device function. 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[employer])  SN#  [id number]  Implant date: 3/10/2022	     Estimated battery longevity:  11 years    Ventricular Tachy  VF        220 bpm    ATP                        41J, 41J, 41J x6  VT       185 bpm    Scan   [id number]        41J,41J,41J x4    VT-1    150bpm     Monitor Only    Atrial Tachy  ATR Mode switch      170bpm   VDIR    Richard/CRT    Mode: DDDR    Lower rate/UTR: 60/110bpm    Leads:    RA    Sensing: AGC  0.25 mV    Impedance:  690 ohms    Threshold: 0.5V@0.4ms    Intrinsic Amplitude:  4.1 mV     Pacing output / Pulse width:  AUTO 2.4V @0.4 ms     RV    Sensing: AGC 0.6 mV    Impedance:  525 ohms    Threshold:  0.5 V @ 0.4ms     Intrinsic Amplitude:  N/R    Pacing output / Pulse width:  AUTO 2.0V @0.4 ms    LV    Sensing: AGC 1.0 mV    Impedance:  539 ohms    Threshold:  1.1 V @ 0.4ms     Intrinsic Amplitude:  N/R    Pacing output / Pulse width:  AUTO 2.1V @0.4 ms    Shock Impedance  67ohms    Counters (Since April 26, 2022)  A Paced  49%  RV Paced  4%  LVa Paced   97%  LVb Paced 0  AT/AF   < 1%      Episodes: Multiple episodes of NSVT and PMT at rate of 110bmp.  No events on the day of admission.    Changes made: None    Conclusion: Normal device function.

## 2024-01-03 DIAGNOSIS — I25.10 ATHEROSCLEROTIC HEART DISEASE OF NATIVE CORONARY ARTERY WITHOUT ANGINA PECTORIS: ICD-10-CM

## 2024-01-03 LAB
ANION GAP SERPL CALC-SCNC: 5 MMOL/L — SIGNIFICANT CHANGE UP (ref 5–17)
ANION GAP SERPL CALC-SCNC: 5 MMOL/L — SIGNIFICANT CHANGE UP (ref 5–17)
APTT BLD: 125.8 SEC — CRITICAL HIGH (ref 24.5–35.6)
APTT BLD: 125.8 SEC — CRITICAL HIGH (ref 24.5–35.6)
APTT BLD: 48.5 SEC — HIGH (ref 24.5–35.6)
APTT BLD: 48.5 SEC — HIGH (ref 24.5–35.6)
APTT BLD: 53.6 SEC — HIGH (ref 24.5–35.6)
APTT BLD: 53.6 SEC — HIGH (ref 24.5–35.6)
APTT BLD: 92.5 SEC — HIGH (ref 24.5–35.6)
APTT BLD: 92.5 SEC — HIGH (ref 24.5–35.6)
BUN SERPL-MCNC: 19 MG/DL — HIGH (ref 7–18)
BUN SERPL-MCNC: 19 MG/DL — HIGH (ref 7–18)
CALCIUM SERPL-MCNC: 8.8 MG/DL — SIGNIFICANT CHANGE UP (ref 8.4–10.5)
CALCIUM SERPL-MCNC: 8.8 MG/DL — SIGNIFICANT CHANGE UP (ref 8.4–10.5)
CHLORIDE SERPL-SCNC: 108 MMOL/L — SIGNIFICANT CHANGE UP (ref 96–108)
CHLORIDE SERPL-SCNC: 108 MMOL/L — SIGNIFICANT CHANGE UP (ref 96–108)
CO2 SERPL-SCNC: 23 MMOL/L — SIGNIFICANT CHANGE UP (ref 22–31)
CO2 SERPL-SCNC: 23 MMOL/L — SIGNIFICANT CHANGE UP (ref 22–31)
CREAT SERPL-MCNC: 1.1 MG/DL — SIGNIFICANT CHANGE UP (ref 0.5–1.3)
CREAT SERPL-MCNC: 1.1 MG/DL — SIGNIFICANT CHANGE UP (ref 0.5–1.3)
EGFR: 70 ML/MIN/1.73M2 — SIGNIFICANT CHANGE UP
EGFR: 70 ML/MIN/1.73M2 — SIGNIFICANT CHANGE UP
GLUCOSE BLDC GLUCOMTR-MCNC: 103 MG/DL — HIGH (ref 70–99)
GLUCOSE BLDC GLUCOMTR-MCNC: 103 MG/DL — HIGH (ref 70–99)
GLUCOSE BLDC GLUCOMTR-MCNC: 104 MG/DL — HIGH (ref 70–99)
GLUCOSE BLDC GLUCOMTR-MCNC: 104 MG/DL — HIGH (ref 70–99)
GLUCOSE BLDC GLUCOMTR-MCNC: 89 MG/DL — SIGNIFICANT CHANGE UP (ref 70–99)
GLUCOSE BLDC GLUCOMTR-MCNC: 89 MG/DL — SIGNIFICANT CHANGE UP (ref 70–99)
GLUCOSE BLDC GLUCOMTR-MCNC: 99 MG/DL — SIGNIFICANT CHANGE UP (ref 70–99)
GLUCOSE BLDC GLUCOMTR-MCNC: 99 MG/DL — SIGNIFICANT CHANGE UP (ref 70–99)
GLUCOSE SERPL-MCNC: 91 MG/DL — SIGNIFICANT CHANGE UP (ref 70–99)
GLUCOSE SERPL-MCNC: 91 MG/DL — SIGNIFICANT CHANGE UP (ref 70–99)
HCT VFR BLD CALC: 42.4 % — SIGNIFICANT CHANGE UP (ref 39–50)
HCT VFR BLD CALC: 42.4 % — SIGNIFICANT CHANGE UP (ref 39–50)
HGB BLD-MCNC: 14.4 G/DL — SIGNIFICANT CHANGE UP (ref 13–17)
HGB BLD-MCNC: 14.4 G/DL — SIGNIFICANT CHANGE UP (ref 13–17)
INR BLD: 1.9 RATIO — HIGH (ref 0.85–1.18)
INR BLD: 1.9 RATIO — HIGH (ref 0.85–1.18)
MCHC RBC-ENTMCNC: 31.2 PG — SIGNIFICANT CHANGE UP (ref 27–34)
MCHC RBC-ENTMCNC: 31.2 PG — SIGNIFICANT CHANGE UP (ref 27–34)
MCHC RBC-ENTMCNC: 34 GM/DL — SIGNIFICANT CHANGE UP (ref 32–36)
MCHC RBC-ENTMCNC: 34 GM/DL — SIGNIFICANT CHANGE UP (ref 32–36)
MCV RBC AUTO: 92 FL — SIGNIFICANT CHANGE UP (ref 80–100)
MCV RBC AUTO: 92 FL — SIGNIFICANT CHANGE UP (ref 80–100)
NRBC # BLD: 0 /100 WBCS — SIGNIFICANT CHANGE UP (ref 0–0)
NRBC # BLD: 0 /100 WBCS — SIGNIFICANT CHANGE UP (ref 0–0)
PLATELET # BLD AUTO: 204 K/UL — SIGNIFICANT CHANGE UP (ref 150–400)
PLATELET # BLD AUTO: 204 K/UL — SIGNIFICANT CHANGE UP (ref 150–400)
POTASSIUM SERPL-MCNC: 4.5 MMOL/L — SIGNIFICANT CHANGE UP (ref 3.5–5.3)
POTASSIUM SERPL-MCNC: 4.5 MMOL/L — SIGNIFICANT CHANGE UP (ref 3.5–5.3)
POTASSIUM SERPL-SCNC: 4.5 MMOL/L — SIGNIFICANT CHANGE UP (ref 3.5–5.3)
POTASSIUM SERPL-SCNC: 4.5 MMOL/L — SIGNIFICANT CHANGE UP (ref 3.5–5.3)
PROTHROM AB SERPL-ACNC: 21.3 SEC — HIGH (ref 9.5–13)
PROTHROM AB SERPL-ACNC: 21.3 SEC — HIGH (ref 9.5–13)
RBC # BLD: 4.61 M/UL — SIGNIFICANT CHANGE UP (ref 4.2–5.8)
RBC # BLD: 4.61 M/UL — SIGNIFICANT CHANGE UP (ref 4.2–5.8)
RBC # FLD: 13.9 % — SIGNIFICANT CHANGE UP (ref 10.3–14.5)
RBC # FLD: 13.9 % — SIGNIFICANT CHANGE UP (ref 10.3–14.5)
SODIUM SERPL-SCNC: 136 MMOL/L — SIGNIFICANT CHANGE UP (ref 135–145)
SODIUM SERPL-SCNC: 136 MMOL/L — SIGNIFICANT CHANGE UP (ref 135–145)
WBC # BLD: 7.64 K/UL — SIGNIFICANT CHANGE UP (ref 3.8–10.5)
WBC # BLD: 7.64 K/UL — SIGNIFICANT CHANGE UP (ref 3.8–10.5)
WBC # FLD AUTO: 7.64 K/UL — SIGNIFICANT CHANGE UP (ref 3.8–10.5)
WBC # FLD AUTO: 7.64 K/UL — SIGNIFICANT CHANGE UP (ref 3.8–10.5)

## 2024-01-03 RX ORDER — WARFARIN SODIUM 2.5 MG/1
7 TABLET ORAL AT BEDTIME
Refills: 0 | Status: DISCONTINUED | OUTPATIENT
Start: 2024-01-03 | End: 2024-01-05

## 2024-01-03 RX ORDER — WARFARIN SODIUM 2.5 MG/1
5 TABLET ORAL AT BEDTIME
Refills: 0 | Status: DISCONTINUED | OUTPATIENT
Start: 2024-01-03 | End: 2024-01-03

## 2024-01-03 RX ORDER — POLYETHYLENE GLYCOL 3350 17 G/17G
17 POWDER, FOR SOLUTION ORAL DAILY
Refills: 0 | Status: DISCONTINUED | OUTPATIENT
Start: 2024-01-03 | End: 2024-01-08

## 2024-01-03 RX ORDER — SENNA PLUS 8.6 MG/1
2 TABLET ORAL AT BEDTIME
Refills: 0 | Status: DISCONTINUED | OUTPATIENT
Start: 2024-01-03 | End: 2024-01-08

## 2024-01-03 RX ORDER — IPRATROPIUM/ALBUTEROL SULFATE 18-103MCG
3 AEROSOL WITH ADAPTER (GRAM) INHALATION EVERY 6 HOURS
Refills: 0 | Status: DISCONTINUED | OUTPATIENT
Start: 2024-01-03 | End: 2024-01-08

## 2024-01-03 RX ADMIN — HEPARIN SODIUM 800 UNIT(S)/HR: 5000 INJECTION INTRAVENOUS; SUBCUTANEOUS at 03:21

## 2024-01-03 RX ADMIN — Medication 3 MILLILITER(S): at 08:35

## 2024-01-03 RX ADMIN — WARFARIN SODIUM 7 MILLIGRAM(S): 2.5 TABLET ORAL at 21:45

## 2024-01-03 RX ADMIN — ATORVASTATIN CALCIUM 20 MILLIGRAM(S): 80 TABLET, FILM COATED ORAL at 21:45

## 2024-01-03 RX ADMIN — Medication 3 MILLILITER(S): at 03:00

## 2024-01-03 RX ADMIN — Medication 40 MILLIEQUIVALENT(S): at 02:34

## 2024-01-03 RX ADMIN — HEPARIN SODIUM 1000 UNIT(S)/HR: 5000 INJECTION INTRAVENOUS; SUBCUTANEOUS at 09:05

## 2024-01-03 RX ADMIN — HEPARIN SODIUM 600 UNIT(S)/HR: 5000 INJECTION INTRAVENOUS; SUBCUTANEOUS at 00:45

## 2024-01-03 RX ADMIN — Medication 1 SPRAY(S): at 06:19

## 2024-01-03 RX ADMIN — Medication 1 SPRAY(S): at 17:39

## 2024-01-03 RX ADMIN — Medication 1200 MILLIGRAM(S): at 17:37

## 2024-01-03 RX ADMIN — SACUBITRIL AND VALSARTAN 1 TABLET(S): 24; 26 TABLET, FILM COATED ORAL at 17:37

## 2024-01-03 RX ADMIN — HEPARIN SODIUM 800 UNIT(S)/HR: 5000 INJECTION INTRAVENOUS; SUBCUTANEOUS at 19:19

## 2024-01-03 RX ADMIN — HEPARIN SODIUM 800 UNIT(S)/HR: 5000 INJECTION INTRAVENOUS; SUBCUTANEOUS at 07:13

## 2024-01-03 RX ADMIN — Medication 12.5 MILLIGRAM(S): at 17:37

## 2024-01-03 RX ADMIN — Medication 1200 MILLIGRAM(S): at 06:47

## 2024-01-03 RX ADMIN — HEPARIN SODIUM 800 UNIT(S)/HR: 5000 INJECTION INTRAVENOUS; SUBCUTANEOUS at 21:49

## 2024-01-03 RX ADMIN — HEPARIN SODIUM 800 UNIT(S)/HR: 5000 INJECTION INTRAVENOUS; SUBCUTANEOUS at 15:26

## 2024-01-03 RX ADMIN — Medication 12.5 MILLIGRAM(S): at 06:19

## 2024-01-03 RX ADMIN — SENNA PLUS 2 TABLET(S): 8.6 TABLET ORAL at 21:45

## 2024-01-03 RX ADMIN — SACUBITRIL AND VALSARTAN 1 TABLET(S): 24; 26 TABLET, FILM COATED ORAL at 06:19

## 2024-01-03 NOTE — PROGRESS NOTE ADULT - SUBJECTIVE AND OBJECTIVE BOX
Patient is a 78y old  Male who presents with a chief complaint of syncope (03 Jan 2024 11:18)      INTERVAL HPI/OVERNIGHT EVENTS: no overnight events    I&O's Summary    02 Jan 2024 07:01  -  03 Jan 2024 07:00  --------------------------------------------------------  IN: 360 mL / OUT: 0 mL / NET: 360 mL      Vital Signs Last 24 Hrs  T(C): 36.7 (03 Jan 2024 15:35), Max: 37.2 (02 Jan 2024 23:55)  T(F): 98.1 (03 Jan 2024 15:35), Max: 99 (02 Jan 2024 23:55)  HR: 65 (03 Jan 2024 15:35) (62 - 67)  BP: 107/56 (03 Jan 2024 15:35) (103/63 - 129/70)  BP(mean): --  RR: 18 (03 Jan 2024 15:35) (16 - 18)  SpO2: 96% (03 Jan 2024 15:35) (93% - 97%)    Parameters below as of 03 Jan 2024 15:35  Patient On (Oxygen Delivery Method): room air      PAST MEDICAL & SURGICAL HISTORY:  Pacemaker      Diabetes mellitus      Hypertension      Hyperlipidemia          SOCIAL HISTORY  Alcohol:  Tobacco:  Illicit substance use:      FAMILY HISTORY:      LABS:                        14.4   7.64  )-----------( 204      ( 03 Jan 2024 07:55 )             42.4     01-03    136  |  108  |  19<H>  ----------------------------<  91  4.5   |  23  |  1.10    Ca    8.8      03 Jan 2024 07:55      PT/INR - ( 03 Jan 2024 07:55 )   PT: 21.3 sec;   INR: 1.90 ratio         PTT - ( 03 Jan 2024 14:45 )  PTT:125.8 sec  Urinalysis Basic - ( 03 Jan 2024 07:55 )    Color: x / Appearance: x / SG: x / pH: x  Gluc: 91 mg/dL / Ketone: x  / Bili: x / Urobili: x   Blood: x / Protein: x / Nitrite: x   Leuk Esterase: x / RBC: x / WBC x   Sq Epi: x / Non Sq Epi: x / Bacteria: x      CAPILLARY BLOOD GLUCOSE      POCT Blood Glucose.: 89 mg/dL (03 Jan 2024 16:55)  POCT Blood Glucose.: 103 mg/dL (03 Jan 2024 12:01)  POCT Blood Glucose.: 99 mg/dL (03 Jan 2024 08:05)  POCT Blood Glucose.: 139 mg/dL (02 Jan 2024 21:04)        Urinalysis Basic - ( 03 Jan 2024 07:55 )    Color: x / Appearance: x / SG: x / pH: x  Gluc: 91 mg/dL / Ketone: x  / Bili: x / Urobili: x   Blood: x / Protein: x / Nitrite: x   Leuk Esterase: x / RBC: x / WBC x   Sq Epi: x / Non Sq Epi: x / Bacteria: x        MEDICATIONS  (STANDING):  atorvastatin 20 milliGRAM(s) Oral at bedtime  fluticasone propionate 50 MICROgram(s)/spray Nasal Spray 1 Spray(s) Both Nostrils two times a day  guaiFENesin ER 1200 milliGRAM(s) Oral every 12 hours  heparin  Infusion. 1000 Unit(s)/Hr (10 mL/Hr) IV Continuous <Continuous>  insulin lispro (ADMELOG) corrective regimen sliding scale   SubCutaneous Before meals and at bedtime  levoFLOXacin  Tablet 500 milliGRAM(s) Oral every 24 hours  metoprolol tartrate 12.5 milliGRAM(s) Oral two times a day  sacubitril 24 mG/valsartan 26 mG 1 Tablet(s) Oral two times a day  warfarin 7 milliGRAM(s) Oral at bedtime    MEDICATIONS  (PRN):  acetaminophen     Tablet .. 650 milliGRAM(s) Oral every 6 hours PRN Temp greater or equal to 38C (100.4F), Mild Pain (1 - 3)  albuterol/ipratropium for Nebulization 3 milliLiter(s) Nebulizer every 6 hours PRN Shortness of Breath and/or Wheezing  ondansetron Injectable 4 milliGRAM(s) IV Push every 8 hours PRN Nausea and/or Vomiting  oxyCODONE    IR 5 milliGRAM(s) Oral every 6 hours PRN Moderate Pain (4 - 6)      REVIEW OF SYSTEMS:  CONSTITUTIONAL: No feve  EYES: No eye pain, visual disturbances  ENMT:   No sinus or throat pain  NECK: No pain   RESPIRATORY: No cough, wheezing; No shortness of breath  CARDIOVASCULAR: No chest pain, palpitations, dizziness, or leg swelling  GASTROINTESTINAL: No abdominal pain. No nausea, vomiting, No diarrhea or constipation. No melena or hematochezia.  GENITOURINARY: No dysuria, frequency, hematuria  NEUROLOGICAL: No headaches  SKIN: No, rashes  MUSCULOSKELETAL: No joint pain    RADIOLOGY & ADDITIONAL TESTS:  < from: Xray Chest 1 View-PORTABLE IMMEDIATE (Xray Chest 1 View-PORTABLE IMMEDIATE .) (12.30.23 @ 18:36) >    ACC: [id number] EXAM:  XR CHEST PORTABLE IMMED 1V   ORDERED BY: LOY MENDEZ     PROCEDURE DATE:  12/30/2023          INTERPRETATION:  Chest portable semierect    CLINICAL HISTORY: Syncope    Comparison:  CT chest of the same day    IMPRESSION:  Borderline sized heart with prior open heart surgery and   pacemaker defibrillator identified.. Lungs are partially obscured by   pacemaker battery pack on the left side. The visualized lungs reveal   areas of linear atelectasis/fibrosis at the lower lung fields. Angles   sharp. Bones without acute abnormality.    --- End of Report ---    JOLLY MOORE MD; Attending Radiologist  This document has been electronically signed. Dec 31 2023  8:31AM    < end of copied text >    ACC: [id number] EXAM:  CT CERVICAL SPINE   ORDERED BY: LOY MENDEZ     ACC: [id number] EXAM:  CT BRAIN   ORDERED BY: LOY MENDEZ     PROCEDURE DATE:  12/30/2023          INTERPRETATION:  CT head without IV contrast    CLINICALINFORMATION:  Fall   Intracranial hemorrhage.    TECHNIQUE: Contiguous axial 5 mm sections were obtained through the head.   Sagittal and coronal 2-D reformatted images were also obtained.   This   scan was performed using automatic exposure control (radiation dose   reduction software) to obtain a diagnostic image quality scan with   patient dose as low as reasonably achievable.    FINDINGS:   No previous examinations are available for review.    The brain demonstrates moderate anterior periventricular white matter   ischemia.   No acute cerebral cortical infarct is seen.  No intracranial   hemorrhage is found.  No mass effect is found in the brain.    The ventricles, sulci and basal cisterns show mild global atrophy.    The orbits are unremarkable.  The paranasal sinuses are significant for   moderate mucosal thickening in the BILATERAL ethmoid sinuses and   secretions in the RIGHT sphenoid sinus.  The nasal cavity appears intact.    The nasopharynx is symmetric.  The central skull base, petrous temporal   bones and calvarium remain intact.        CT cervical spine without IV contrast    CLINICAL INFORMATION: Fracture, trauma, neck pain.  Neck pain, spinal   stenosis, spondylosis. fall syncope    TECHNIQUE:  Contiguous axial 2.0 mm sections were obtained through the   cervical spine using a single helical acquisition.  Additional 2 mm   sagittal and coronal reformatted reconstructions of the spine were   obtained.  These additional reformatted images were used to evaluate the   spine for alignment, vertebral fractures and the integrity of the the   posterior elements.   This scan was performed using automatic exposure   control (radiation dose reduction software) to obtain a diagnostic image   quality scan with patient dose as low as reasonably achievable.    FINDINGS:   No prior similar studies are available for review    Cervical vertebral body heights are maintained. No vertebral fracture is   seen. No destructive bone lesion is found.  Alignment is significant for   straightening on a degenerative basis.  Facet joints appear intact and   aligned.    Cervical intervertebral disc spaces show mild to moderate degenerative   disc disease and spondylosis at C3-4 C6-7 with loss of disc height and   associated degenerative endplate changes. There is narrowing of the RIGHT   C6/7 neural foramen due to uncovertebral spurring. Mild posterior   osteophytic ridge/disc complexes at C4-5 through C6-7 flatten the ventral   thecal sac.    The skull base appears intact.  No neck mass is recognized.  Paraspinal   soft tissues appear intact. Visualized lymph nodes appear to be within   physiologic size limits.  Prominence of the aortic arch. See chest CT for   details.          IMPRESSION:    CT HEAD: Moderate anterior periventricular white matter ischemia.    Mild   global atrophy.  Moderate mucosal thickening in the BILATERAL ethmoid   sinuses and secretions in the RIGHT sphenoid sinus.    CT cervical spine:   No vertebral fracture is recognized.  Mild to   moderate degenerative disc disease and spondylosis at C3-4 C6-7 with loss   of disc height and associated degenerative endplate changes. There is   narrowing of the RIGHT C6/7 neural foramen due to uncovertebral spurring.   Mild posterior osteophytic ridge/disccomplexes at C4-5 through C6-7   flatten the ventral thecal sac.Prominence of the aortic arch. See chest   CT for details.    --- End of Report ---    ACC: [id number] EXAM:  CT CHEST   ORDERED BY: LOY MENDEZ     PROCEDURE DATE:  12/30/2023          INTERPRETATION:  CLINICAL INDICATION: R posterior rib pain fall syncope   yesterday    PROCEDURE: CT of the chest was performed without intravenous contrast.   Coronal and sagittal reconstruction images were obtained.    CONTRAST/COMPLICATIONS:  IV Contrast: NONE  Oral Contrast: NONE  Complications: None reported at time of study completion    COMPARISON: None.    FINDINGS: Evaluation ofthe thoracic organs/vasculature is limited   without intravenous contrast. Artifact from patient's arms and   respiratory motion degrades images.    LUNGS AND AIRWAYS: Patent central airways. Bronchial thickening with   areas of narrowing at the lungbases, which may be due to mucus plugging.   Linear opacities at the left > right lung base, suggesting   atelectasis/scarring.  PLEURA: No pleural effusion or pneumothorax.  HEART: Normal size. No pericardial effusion. Mitral annular and coronary   desiccation. Aortic valve replacement.  VESSELS: Atherosclerosis. Ectatic distal ascending aorta/ascending aortic   arch (4.0 cm). CABG.  MEDIASTINUM AND YARELI: Subcentimeter lymph nodes.  CHEST WALL AND LOWER NECK: Left-sided AICD/pacemaker. Median sternotomy.  UPPER ABDOMEN: Fatty atrophy of the visualized pancreas. Bilateral renal   cysts. Bilateral perinephric stranding. Nonobstructing 0.2 cm right renal   stone. Colon diverticulosis. Partially visualized visualized duodenal   diverticulum.  BONES: Acute fractures of the right sixth through ninth anterior ribs   (nondisplaced right sixth and seventh and minimally displaced right   eighth and ninth ribs). Corticated, slight deformities of additional   bilateral ribs, suggesting chronic. Degenerative changes of the spine.    IMPRESSION:    Evaluation of the thoracic organs/vasculature is limited without   intravenous contrast. Acute right-sided rib fractures. No obvious   pneumothorax.    Bibasilar atelectasis/scarring. Findings suggestive of mucus plugging at   the lung bases.    Additional findings as described.    --- End of Report ---    Imaging Personally Reviewed:  [ x] YES  [ ] NO    Consultant(s) Notes Reviewed:  [x ] YES  [ ] NO    PHYSICAL EXAM:  GENERAL: NAD  HEAD:  Atraumatic, Normocephalic  EYES: conjunctiva and sclera clear  ENMT: Moist mucous membranes  NECK: Supple  NERVOUS SYSTEM:  Alert & Oriented X3, Good concentration  CHEST/LUNG: CTA bilaterally; No rales, rhonchi, wheezing  HEART: Regular rate and rhythm  ABDOMEN: Soft, Nontender, Nondistended; Bowel sounds present  EXTREMITIES:  No clubbing, cyanosis, or edema  SKIN: No rashes     Care Collaborated Discussed with Consultants/Other Providers [x ] YES  [ ] NO Patient is a 78y old  Male who presents with a chief complaint of syncope (03 Jan 2024 11:18)      INTERVAL HPI/OVERNIGHT EVENTS: no overnight events    I&O's Summary    02 Jan 2024 07:01  -  03 Jan 2024 07:00  --------------------------------------------------------  IN: 360 mL / OUT: 0 mL / NET: 360 mL      Vital Signs Last 24 Hrs  T(C): 36.7 (03 Jan 2024 15:35), Max: 37.2 (02 Jan 2024 23:55)  T(F): 98.1 (03 Jan 2024 15:35), Max: 99 (02 Jan 2024 23:55)  HR: 65 (03 Jan 2024 15:35) (62 - 67)  BP: 107/56 (03 Jan 2024 15:35) (103/63 - 129/70)  BP(mean): --  RR: 18 (03 Jan 2024 15:35) (16 - 18)  SpO2: 96% (03 Jan 2024 15:35) (93% - 97%)    Parameters below as of 03 Jan 2024 15:35  Patient On (Oxygen Delivery Method): room air      PAST MEDICAL & SURGICAL HISTORY:  Pacemaker      Diabetes mellitus      Hypertension      Hyperlipidemia          SOCIAL HISTORY  Alcohol:  Tobacco:  Illicit substance use:      FAMILY HISTORY:      LABS:                        14.4   7.64  )-----------( 204      ( 03 Jan 2024 07:55 )             42.4     01-03    136  |  108  |  19<H>  ----------------------------<  91  4.5   |  23  |  1.10    Ca    8.8      03 Jan 2024 07:55      PT/INR - ( 03 Jan 2024 07:55 )   PT: 21.3 sec;   INR: 1.90 ratio         PTT - ( 03 Jan 2024 14:45 )  PTT:125.8 sec  Urinalysis Basic - ( 03 Jan 2024 07:55 )    Color: x / Appearance: x / SG: x / pH: x  Gluc: 91 mg/dL / Ketone: x  / Bili: x / Urobili: x   Blood: x / Protein: x / Nitrite: x   Leuk Esterase: x / RBC: x / WBC x   Sq Epi: x / Non Sq Epi: x / Bacteria: x      CAPILLARY BLOOD GLUCOSE      POCT Blood Glucose.: 89 mg/dL (03 Jan 2024 16:55)  POCT Blood Glucose.: 103 mg/dL (03 Jan 2024 12:01)  POCT Blood Glucose.: 99 mg/dL (03 Jan 2024 08:05)  POCT Blood Glucose.: 139 mg/dL (02 Jan 2024 21:04)        Urinalysis Basic - ( 03 Jan 2024 07:55 )    Color: x / Appearance: x / SG: x / pH: x  Gluc: 91 mg/dL / Ketone: x  / Bili: x / Urobili: x   Blood: x / Protein: x / Nitrite: x   Leuk Esterase: x / RBC: x / WBC x   Sq Epi: x / Non Sq Epi: x / Bacteria: x        MEDICATIONS  (STANDING):  atorvastatin 20 milliGRAM(s) Oral at bedtime  fluticasone propionate 50 MICROgram(s)/spray Nasal Spray 1 Spray(s) Both Nostrils two times a day  guaiFENesin ER 1200 milliGRAM(s) Oral every 12 hours  heparin  Infusion. 1000 Unit(s)/Hr (10 mL/Hr) IV Continuous <Continuous>  insulin lispro (ADMELOG) corrective regimen sliding scale   SubCutaneous Before meals and at bedtime  levoFLOXacin  Tablet 500 milliGRAM(s) Oral every 24 hours  metoprolol tartrate 12.5 milliGRAM(s) Oral two times a day  sacubitril 24 mG/valsartan 26 mG 1 Tablet(s) Oral two times a day  warfarin 7 milliGRAM(s) Oral at bedtime    MEDICATIONS  (PRN):  acetaminophen     Tablet .. 650 milliGRAM(s) Oral every 6 hours PRN Temp greater or equal to 38C (100.4F), Mild Pain (1 - 3)  albuterol/ipratropium for Nebulization 3 milliLiter(s) Nebulizer every 6 hours PRN Shortness of Breath and/or Wheezing  ondansetron Injectable 4 milliGRAM(s) IV Push every 8 hours PRN Nausea and/or Vomiting  oxyCODONE    IR 5 milliGRAM(s) Oral every 6 hours PRN Moderate Pain (4 - 6)      REVIEW OF SYSTEMS:  CONSTITUTIONAL: No feve  EYES: No eye pain, visual disturbances  ENMT:   No sinus or throat pain  NECK: No pain   RESPIRATORY: No cough, wheezing; No shortness of breath  CARDIOVASCULAR: No chest pain, palpitations, dizziness, or leg swelling  GASTROINTESTINAL: No abdominal pain. No nausea, vomiting, No diarrhea or constipation. No melena or hematochezia.  GENITOURINARY: No dysuria, frequency, hematuria  NEUROLOGICAL: No headaches  SKIN: No, rashes  MUSCULOSKELETAL: No joint pain    RADIOLOGY & ADDITIONAL TESTS:  < from: Xray Chest 1 View-PORTABLE IMMEDIATE (Xray Chest 1 View-PORTABLE IMMEDIATE .) (12.30.23 @ 18:36) >    ACC: [id number] EXAM:  XR CHEST PORTABLE IMMED 1V   ORDERED BY: LOY MENDEZ     PROCEDURE DATE:  12/30/2023          INTERPRETATION:  Chest portable semierect    CLINICAL HISTORY: Syncope    Comparison:  CT chest of the same day    IMPRESSION:  Borderline sized heart with prior open heart surgery and   pacemaker defibrillator identified.. Lungs are partially obscured by   pacemaker battery pack on the left side. The visualized lungs reveal   areas of linear atelectasis/fibrosis at the lower lung fields. Angles   sharp. Bones without acute abnormality.    --- End of Report ---    JOLLY MOORE MD; Attending Radiologist  This document has been electronically signed. Dec 31 2023  8:31AM    < end of copied text >    ACC: [id number] EXAM:  CT CERVICAL SPINE   ORDERED BY: LOY MENDEZ     ACC: [id number] EXAM:  CT BRAIN   ORDERED BY: LOY MENDEZ     PROCEDURE DATE:  12/30/2023          INTERPRETATION:  CT head without IV contrast    CLINICALINFORMATION:  Fall   Intracranial hemorrhage.    TECHNIQUE: Contiguous axial 5 mm sections were obtained through the head.   Sagittal and coronal 2-D reformatted images were also obtained.   This   scan was performed using automatic exposure control (radiation dose   reduction software) to obtain a diagnostic image quality scan with   patient dose as low as reasonably achievable.    FINDINGS:   No previous examinations are available for review.    The brain demonstrates moderate anterior periventricular white matter   ischemia.   No acute cerebral cortical infarct is seen.  No intracranial   hemorrhage is found.  No mass effect is found in the brain.    The ventricles, sulci and basal cisterns show mild global atrophy.    The orbits are unremarkable.  The paranasal sinuses are significant for   moderate mucosal thickening in the BILATERAL ethmoid sinuses and   secretions in the RIGHT sphenoid sinus.  The nasal cavity appears intact.    The nasopharynx is symmetric.  The central skull base, petrous temporal   bones and calvarium remain intact.        CT cervical spine without IV contrast    CLINICAL INFORMATION: Fracture, trauma, neck pain.  Neck pain, spinal   stenosis, spondylosis. fall syncope    TECHNIQUE:  Contiguous axial 2.0 mm sections were obtained through the   cervical spine using a single helical acquisition.  Additional 2 mm   sagittal and coronal reformatted reconstructions of the spine were   obtained.  These additional reformatted images were used to evaluate the   spine for alignment, vertebral fractures and the integrity of the the   posterior elements.   This scan was performed using automatic exposure   control (radiation dose reduction software) to obtain a diagnostic image   quality scan with patient dose as low as reasonably achievable.    FINDINGS:   No prior similar studies are available for review    Cervical vertebral body heights are maintained. No vertebral fracture is   seen. No destructive bone lesion is found.  Alignment is significant for   straightening on a degenerative basis.  Facet joints appear intact and   aligned.    Cervical intervertebral disc spaces show mild to moderate degenerative   disc disease and spondylosis at C3-4 C6-7 with loss of disc height and   associated degenerative endplate changes. There is narrowing of the RIGHT   C6/7 neural foramen due to uncovertebral spurring. Mild posterior   osteophytic ridge/disc complexes at C4-5 through C6-7 flatten the ventral   thecal sac.    The skull base appears intact.  No neck mass is recognized.  Paraspinal   soft tissues appear intact. Visualized lymph nodes appear to be within   physiologic size limits.  Prominence of the aortic arch. See chest CT for   details.          IMPRESSION:    CT HEAD: Moderate anterior periventricular white matter ischemia.    Mild   global atrophy.  Moderate mucosal thickening in the BILATERAL ethmoid   sinuses and secretions in the RIGHT sphenoid sinus.    CT cervical spine:   No vertebral fracture is recognized.  Mild to   moderate degenerative disc disease and spondylosis at C3-4 C6-7 with loss   of disc height and associated degenerative endplate changes. There is   narrowing of the RIGHT C6/7 neural foramen due to uncovertebral spurring.   Mild posterior osteophytic ridge/disccomplexes at C4-5 through C6-7   flatten the ventral thecal sac.Prominence of the aortic arch. See chest   CT for details.    --- End of Report ---    ACC: [id number] EXAM:  CT CHEST   ORDERED BY: LYO MENDEZ     PROCEDURE DATE:  12/30/2023          INTERPRETATION:  CLINICAL INDICATION: R posterior rib pain fall syncope   yesterday    PROCEDURE: CT of the chest was performed without intravenous contrast.   Coronal and sagittal reconstruction images were obtained.    CONTRAST/COMPLICATIONS:  IV Contrast: NONE  Oral Contrast: NONE  Complications: None reported at time of study completion    COMPARISON: None.    FINDINGS: Evaluation ofthe thoracic organs/vasculature is limited   without intravenous contrast. Artifact from patient's arms and   respiratory motion degrades images.    LUNGS AND AIRWAYS: Patent central airways. Bronchial thickening with   areas of narrowing at the lungbases, which may be due to mucus plugging.   Linear opacities at the left > right lung base, suggesting   atelectasis/scarring.  PLEURA: No pleural effusion or pneumothorax.  HEART: Normal size. No pericardial effusion. Mitral annular and coronary   desiccation. Aortic valve replacement.  VESSELS: Atherosclerosis. Ectatic distal ascending aorta/ascending aortic   arch (4.0 cm). CABG.  MEDIASTINUM AND YARELI: Subcentimeter lymph nodes.  CHEST WALL AND LOWER NECK: Left-sided AICD/pacemaker. Median sternotomy.  UPPER ABDOMEN: Fatty atrophy of the visualized pancreas. Bilateral renal   cysts. Bilateral perinephric stranding. Nonobstructing 0.2 cm right renal   stone. Colon diverticulosis. Partially visualized visualized duodenal   diverticulum.  BONES: Acute fractures of the right sixth through ninth anterior ribs   (nondisplaced right sixth and seventh and minimally displaced right   eighth and ninth ribs). Corticated, slight deformities of additional   bilateral ribs, suggesting chronic. Degenerative changes of the spine.    IMPRESSION:    Evaluation of the thoracic organs/vasculature is limited without   intravenous contrast. Acute right-sided rib fractures. No obvious   pneumothorax.    Bibasilar atelectasis/scarring. Findings suggestive of mucus plugging at   the lung bases.    Additional findings as described.    --- End of Report ---    Imaging Personally Reviewed:  [ x] YES  [ ] NO    Consultant(s) Notes Reviewed:  [x ] YES  [ ] NO    PHYSICAL EXAM:  GENERAL: NAD  HEAD:  Atraumatic, Normocephalic  EYES: conjunctiva and sclera clear  ENMT: Moist mucous membranes  NECK: Supple  NERVOUS SYSTEM:  Alert & Oriented X3, Good concentration  CHEST/LUNG: CTA bilaterally; No rales, rhonchi, wheezing  HEART: Regular rate and rhythm  ABDOMEN: Soft, Nontender, Nondistended; Bowel sounds present  EXTREMITIES:  No clubbing, cyanosis, or edema  SKIN: No rashes     Care Collaborated Discussed with Consultants/Other Providers [x ] YES  [ ] NO

## 2024-01-03 NOTE — PROGRESS NOTE ADULT - PROBLEM SELECTOR PLAN 3
CT chest: Evaluation of the thoracic organs/vasculature is limited without intravenous contrast.                 Acute right-sided rib fractures.   Tylenol - mild pain.   Oxycodone - moderate pain.   Lidocaine patch.  incentive spirometer

## 2024-01-03 NOTE — PROGRESS NOTE ADULT - PROBLEM SELECTOR PLAN 1
P/w syncope   RVP negative x 2   CT HEAD as above  Echo noted  +orthostatic bp  ICD interrogated 1/2/24 with normal device function  s/p tele.   PT recc NO SKILLED NEEDS  stress test in AM- 1/5/24  Consulted cardiology - Dr. Toure.   Consulted neurology - Dr. Coffey. recc IVF.

## 2024-01-03 NOTE — PROGRESS NOTE ADULT - PROBLEM SELECTOR PLAN 5
rate controlled  C/w metoprolol 12.5 mg BID  on warfarin at home   on heparin gtt bridge to warfarin for mechanical valve   coumadin to 7mg  (1/3/24)  goal inr 2.5-3.5  Daily INRs.

## 2024-01-03 NOTE — PROGRESS NOTE ADULT - SUBJECTIVE AND OBJECTIVE BOX
{\rtf1\gmmlsv93018\ansi\dexwvuz7810\ftnbj\uc1\deff0  {\fonttbl{\f0 \fnil Segoe UI;}{\f1 \fnil \fcharset0 Segoe UI;}{\f2 \fnil Times New [place];}}  {\colortbl ;\lmv928\pfrmi634\avpl071 ;\red0\green0\blue0 ;\red0\green0\tkdg958 ;\red0\green0\blue0 ;}  {\stylesheet{\f0\fs20 Normal;}{\cs1 Default Paragraph Font;}{\cs2\f0\fs16 Line Number;}{\cs3\f2\fs24\ul\cf3 Hyperlink;}}  {\*\revtbl{Unknown;}}  \icxxqs91023\thyqtr72222\mocfu2914\mtpbn6918\fienj6593\voqej9412\hncaswx997\kovlhti401\nogrowautofit\fpwcel968\formshade\nofeaturethrottle1\dntblnsbdb\fet4\aendnotes\aftnnrlc\pgbrdrhead\pgbrdrfoot  \sectd\zxcivq80717\pugxtz28072\guttersxn0\tuwkzxjb6086\rmmflfti2593\bxfsmjio3821\iznenjui9139\uptxzhn599\iintpkr770\sbkpage\pgncont\pgndec  \plain\plain\f0\fs24\pard\plain\f0\fs24\plain\f0\fs20\jggo2450\hich\f0\dbch\f0\loch\f0\fs20 Date of Service 01-03-24 @ 10:15\par  \par  CHIEF COMPLAINT:Patient is a 78y old  Male who presents with a chief complaint of NSTEMI (02 Jan 2024 09:54)\par  \par  \tab\par  REVIEW OF SYSTEMS:\par  CONSTITUTIONAL: No fever, weight loss, or fatigue\par  EYES: No eye pain, visual disturbances, or discharge\par  ENT:  No difficulty hearing, tinnitus, vertigo; No sinus or throat pain\par  NECK: No pain or stiffness\par  RESPIRATORY: No cough, wheezing, chills or hemoptysis; No Shortness of Breath\par  CARDIOVASCULAR: No chest pain, palpitations, passing out, dizziness, or leg swelling\par  GASTROINTESTINAL: No abdominal or epigastric pain. No nausea, vomiting, or hematemesis; No diarrhea or constipation. No melena or hematochezia.\par  GENITOURINARY: No dysuria, frequency, hematuria, or incontinence\par  NEUROLOGICAL: No headaches, memory loss, loss of strength, numbness, or tremors\par  SKIN: No itching, burning, rashes, or lesions \par  LYMPH Nodes: No enlarged glands\par  ENDOCRINE: No heat or cold intolerance; No hair loss\par  MUSCULOSKELETAL: No joint pain or swelling; No muscle, back, or extremity pain\par  PSYCHIATRIC: No depression, anxiety, mood swings, or difficulty sleeping\par  HEME/LYMPH: No easy bruising, or bleeding gums\par  ALLERGY AND IMMUNOLOGIC: No hives or eczema\tab\par  \par  \par  \par  PHYSICAL EXAM:\par  T(C): 36.9 (01-03-24 @ 07:25), Max: 37.2 (01-02-24 @ 23:55)\par  HR: 62 (01-03-24 @ 07:25) (59 - 67)\par  BP: 103/63 (01-03-24 @ 07:25) (103/63 - 156/63)\par  RR: 16 (01-03-24 @ 09:24) (16 - 18)\par  SpO2: 95% (01-03-24 @ 09:24) (92% - 97%)\par  Wt(kg): --\par  I&O's Summary\par  \par  02 Jan 2024 07:01  -  03 Jan 2024 07:00\par  --------------------------------------------------------\par  IN: 360 mL / OUT: 0 mL / NET: 360 mL\par  \par  \par  \par  Appearance: Normal\tab\par  HEENT:   Normal oral mucosa, PERRL, EOMI\tab\par  Lymphatic: No lymphadenopathy\par  Cardiovascular: Normal S1 S2, No JVD, No murmurs, No edema\par  Respiratory: Lungs clear to auscultation\tab\par  Psychiatry: A & O x 3, Mood & affect appropriate\par  Gastrointestinal:  Soft, Non-tender, + BS\tab\par  Skin: No rashes, No ecchymoses, No cyanosis\tab\par  Neurologic: Non-focal\par  Extremities: Normal range of motion, No clubbing, cyanosis or edema\par  Vascular: Peripheral pulses palpable 2+ bilaterally\par  \par  MEDICATIONS  (STANDING):\par  albuterol/ipratropium for Nebulization 3 milliLiter(s) Nebulizer every 6 hours\par  atorvastatin 20 milliGRAM(s) Oral at bedtime\par  fluticasone propionate 50 MICROgram(s)/spray Nasal Spray 1 Spray(s) Both Nostrils two times a day\par  guaiFENesin ER 1200 milliGRAM(s) Oral every 12 hours\par  heparin  Infusion. 1000 Unit(s)/Hr (10 mL/Hr) IV Continuous <Continuous>\par  insulin lispro (ADMELOG) corrective regimen sliding scale   SubCutaneous Before meals and at bedtime\par  levoFLOXacin  Tablet 500 milliGRAM(s) Oral every 24 hours\par  metoprolol tartrate 12.5 milliGRAM(s) Oral two times a day\par  sacubitril 24 mG/valsartan 26 mG 1 Tablet(s) Oral two times a day\par  \par  \par  \tab\par  \tab\par  LABS:\tab  \tab\par  \par  \par  \par           \par             14.4 \par  7.64  )-----------( 204      ( 03 Jan 2024 07:55 )\par             42.4 \par  \par  01-03\par  \par  136  |  108  |  19<H>\par  ----------------------------<  91\par  4.5   |  23  |  1.10\par  \par  Ca    8.8      03 Jan 2024 07:55\par  \par  \par  proBNP: \par  Lipid Profile: Cholesterol 132\par  LDL --\par  HDL 47\par  \par  Ldl calc 59\par  Ratio --\par  \par  PT/INR - ( 03 Jan 2024 07:55 )   PT: 21.3 sec;   INR: 1.90 ratio  \par  \par   \par  PTT - ( 03 Jan 2024 07:55 )  PTT:48.5 sec\par  \tab\par  \par  \trowd\shhchl63\iuaryls80\trpaddfl3\umzjbjo90\trpaddfr3\trpaddt0\trpaddft3\trpaddb0\trpaddfb3\trleft0  \clvertalt\rhvhau22\clpadft3\gdlres59\clpadfr3\clpadl0\clpadfl3\clpadb0\clpadfb3\phaon9728  \clvertalt\mvmstr37\clpadft3\hwyphz30\clpadfr3\clpadl0\clpadfl3\clpadb0\clpadfb3\vykey2537  \pard\intbl\ssparaaux0\s0\fi-120\li120\ql\plain\f0\fs24{\*\bkmkstart [id number]}{\*\bkmkend [id number]}\plain\f0\fs20\ckok8438\hich\f0\dbch\f0\loch\f0\fs20 \'b7 \plain\f1\fs20\ktcj4518\hich\f1\dbch\f1\loch\f1\cf2\fs20\b Note Type\plain\f0\fs20\txrt2062\hich\f0\dbch\f0\loch\f0\fs20\cell  \pard\intbl\ssparaaux0\s0\ql\plain\f0\fs24\plain\f0\fs20\acqy8818\hich\f0\dbch\f0\loch\f0\fs20 Event Note  BiV ICD interrogation\cell  \intbl\row  \trowd\fobjip76\lastrow\fomjwnk29\trpaddfl3\yayiwjc98\trpaddfr3\trpaddt0\trpaddft3\trpaddb0\trpaddfb3\trleft0  \clvertalt\ewicgy94\clpadft3\nzbeev24\clpadfr3\clpadl0\clpadfl3\clpadb0\clpadfb3\dwjmx4010  \clvertalt\xjcquz21\clpadft3\xxzdus33\clpadfr3\clpadl0\clpadfl3\clpadb0\clpadfb3\eucky8446  \pard\intbl\ssparaaux0\s0\ql\plain\f0\fs24\plain\f0\fs20\ggpa0875\hich\f0\dbch\f0\loch\f0\fs20\cell  \pard\intbl\ssparaaux0\s0\ql\plain\f0\fs24\plain\f1\fs20\hjer7655\hich\f1\dbch\f1\loch\f1\cf2\fs20\strike\plain\f0\fs20\thga4048\hich\f0\dbch\f0\loch\f0\fs20\cell  \intbl\row  \pard\ssparaaux0\s0\ql\plain\f0\fs24\plain\f0\fs20\tbgq8514\hich\f0\dbch\f0\loch\f0\fs20\par  {\*\bkmkstart [id number]}{\*\bkmkend [id number]}\par  {\*\bkmkstart [id number]}{\*\bkmkend [id number]}ELECTROPHYSIOLOGY\par  \par  Device : RESONATE HF CRT-D G547 ([employer])\par  SN#  [id number]\par  Implant date: 3/10/2022\tab  \par  \par  Estimated battery longevity:  11 years\par  \par  Ventricular Tachy\par  VF        220 bpm    ATP                        41J, 41J, 41J x6\par  VT       185 bpm    Scan   DDC2CND        41J,41J,41J x4  \par  VT-1    150bpm     Monitor Only\par  \par  Atrial Tachy\par  ATR Mode switch      170bpm   VDIR\par  \par  Richard/CRT\par  \par  Mode: DDDR\par  \par  Lower rate/UTR: 60/110bpm\par  \par  Leads:\par  \par  RA\par    Sensing: AGC  0.25 mV\par    Impedance:  690 ohms\par    Threshold: 0.5V@0.4ms\par    Intrinsic Amplitude:  4.1 mV \par    Pacing output / Pulse width:  AUTO 2.4V @0.4 ms\par   \par  RV\par    Sensing: AGC 0.6 mV\par    Impedance:  525 ohms\par    Threshold:  0.5 V @ 0.4ms \par    Intrinsic Amplitude:  N/R\par    Pacing output / Pulse width:  AUTO 2.0V @0.4 ms\par  \par  LV\par    Sensing: AGC 1.0 mV\par    Impedance:  539 ohms\par    Threshold:  1.1 V @ 0.4ms \par    Intrinsic Amplitude:  N/R\par    Pacing output / Pulse width:  AUTO 2.1V @0.4 ms\par  \par  Shock Impedance  67ohms\par  \par  Counters (Since April 26, 2022)\par  A Paced  49%\par  RV Paced  4%\par  LVa Paced   97%\par  LVb Paced 0\par  AT/AF   < 1%\par  \par  \par  Episodes: Multiple episodes of NSVT and PMT at rate of 110bmp.\par  No events on the day of admission.\par  \par  Changes made: None\par  \par  Conclusion: Normal device function.\plain\f1\fs20\vkzp4906\hich\f1\dbch\f1\loch\f1\cf2\fs20\strike\plain\f1\fs20\ppzg8241\hich\f1\dbch\f1\loch\f1\cf2\fs20\plain\f0\fs20\wehw1602\hich\f0\dbch\f0\loch\f0\fs20\par  \par  \par  {\*\bkmkstart bkClinDocSignatures}{\*\bkmkend bkClinDocSignatures}{\*\bkmkstart bkcommentSBK}{\*\bkmkend bkcommentSBK}\plain\f1\fs20\fsfg8533\hich\f1\dbch\f1\loch\f1\cf2\fs20\b Electronic Signatures:\plain\f0\fs20\wfgp8913\hich\f0\dbch\f0\loch\f0\fs20\par  \plain\f1\fs20\pptb1624\hich\f1\dbch\f1\loch\f1\cf2\fs20\b\ul Mosheyeva, Audra (NP)\plain\f0\fs20\dcmr3878\hich\f0\dbch\f0\loch\f0\fs20   \plain\f1\fs18\ggbj1405\hich\f1\dbch\f1\loch\f1\cf2\fs18 (Signed 03-Jan-2024 06:33)\par  \fi-360\li720\plain\f0\fs24\plain\f1\fs18\jqjq8785\hich\f1\dbch\f1\loch\f1\cf2\fs18\tab\plain\f1\fs20\gddh1119\hich\f1\dbch\f1\loch\f1\cf2\fs20\b\i Authored: \plain\f1\fs20\ndgy2846\hich\f1\dbch\f1\loch\f1\cf2\fs20\i Note Type, Note\plain\f0\fs20\obul8546\hich\f0\dbch\f0\loch\f0\fs20\par  \fi0\li0\plain\f0\fs24\plain\f0\fs20\riog3275\hich\f0\dbch\f0\loch\f0\fs20\par  \par  \plain\f1\fs20\yuun8269\hich\f1\dbch\f1\loch\f1\cf2\fs20\b\i Last Updated: \plain\f1\fs20\jyjc3768\hich\f1\dbch\f1\loch\f1\cf2\fs20\i 03-Jan-2024 06:33 by Yelitza Eucedaonora (NP)\plain\f0\fs20\wife6611\hich\f0\dbch\f0\loch\f0\fs20\par  \pard\plain\f0\fs24\plain\f0\fs20\fgyx0612\hich\f0\dbch\f0\loch\f0\fs20\par  < from: TTE W or WO Ultrasound Enhancing Agent (01.01.24 @ 11:26) >\par  \ql\plain\f0\fs24\plain\f1\fs16\ipnw7358\hich\f1\dbch\f1\loch\f1\cf2\fs16 CONCLUSIONS:\par   \par   1. The left atrium is mildly dilated\par   2. 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 Acceleration time of 93 msec and an effictive orifice area indexed to BSA of 0.7 cm2/m2. These findings suggest a high flow state.\par   3. Left ventricular endocardium is not well visualized; however, the left ventricular systolic function appears grossly normal.\par   4. There is increased LV mass and concentric hypertrophy.\par   5. There is mild (grade 1) left ventricular diastolicdysfunction.\par   6. Normal right ventricular cavity size, wall thickness, and systolic function.\par  \pard\plain\f0\fs24\plain\f1\fs16\qrfl9548\hich\f1\dbch\f1\loch\f1\cf2\fs16  7. Device lead is visualized.\par  \par  < end of copied text >\par  \ql\plain\f0\fs24\plain\f0\fs20\yaps9139\hich\f0\dbch\f0\loch\f0\fs20\par  }   {\rtf1\doteuu69306\ansi\mxisvbi8036\ftnbj\uc1\deff0  {\fonttbl{\f0 \fnil Segoe UI;}{\f1 \fnil \fcharset0 Segoe UI;}{\f2 \fnil Times [employer] [place];}}  {\colortbl ;\uaz581\vfixc094\qfrc537 ;\red0\green0\blue0 ;\red0\green0\rpwi919 ;\red0\green0\blue0 ;}  {\stylesheet{\f0\fs20 Normal;}{\cs1 Default Paragraph Font;}{\cs2\f0\fs16 Line Number;}{\cs3\f2\fs24\ul\cf3 Hyperlink;}}  {\*\revtbl{Unknown;}}  \ntqusy19045\lrijnu41926\aaots7088\uwjcu2022\vohka7320\deeng9982\sfsgvwj665\idbhqll605\nogrowautofit\yptndb581\formshade\nofeaturethrottle1\dntblnsbdb\fet4\aendnotes\aftnnrlc\pgbrdrhead\pgbrdrfoot  \sectd\rnmwyq46765\wamxbg10799\guttersxn0\euxyqvxm0491\vmptdhzs3200\ypojahbd0267\tolimtuv6188\sogdrog349\ajojqlq801\sbkpage\pgncont\pgndec  \plain\plain\f0\fs24\pard\plain\f0\fs24\plain\f0\fs20\fxuv8216\hich\f0\dbch\f0\loch\f0\fs20 Date of Service 01-03-24 @ 10:15\par  \par  CHIEF COMPLAINT:Patient is a 78y old  Male who presents with a chief complaint of NSTEMI (02 Jan 2024 09:54)\par  \par  \tab\par  REVIEW OF SYSTEMS:\par  CONSTITUTIONAL: No fever, weight loss, or fatigue\par  EYES: No eye pain, visual disturbances, or discharge\par  ENT:  No difficulty hearing, tinnitus, vertigo; No sinus or throat pain\par  NECK: No pain or stiffness\par  RESPIRATORY: No cough, wheezing, chills or hemoptysis; No Shortness of Breath\par  CARDIOVASCULAR: No chest pain, palpitations, passing out, dizziness, or leg swelling\par  GASTROINTESTINAL: No abdominal or epigastric pain. No nausea, vomiting, or hematemesis; No diarrhea or constipation. No melena or hematochezia.\par  GENITOURINARY: No dysuria, frequency, hematuria, or incontinence\par  NEUROLOGICAL: No headaches, memory loss, loss of strength, numbness, or tremors\par  SKIN: No itching, burning, rashes, or lesions \par  LYMPH Nodes: No enlarged glands\par  ENDOCRINE: No heat or cold intolerance; No hair loss\par  MUSCULOSKELETAL: No joint pain or swelling; No muscle, back, or extremity pain\par  PSYCHIATRIC: No depression, anxiety, mood swings, or difficulty sleeping\par  HEME/LYMPH: No easy bruising, or bleeding gums\par  ALLERGY AND IMMUNOLOGIC: No hives or eczema\tab\par  \par  \par  \par  PHYSICAL EXAM:\par  T(C): 36.9 (01-03-24 @ 07:25), Max: 37.2 (01-02-24 @ 23:55)\par  HR: 62 (01-03-24 @ 07:25) (59 - 67)\par  BP: 103/63 (01-03-24 @ 07:25) (103/63 - 156/63)\par  RR: 16 (01-03-24 @ 09:24) (16 - 18)\par  SpO2: 95% (01-03-24 @ 09:24) (92% - 97%)\par  Wt(kg): --\par  I&O's Summary\par  \par  02 Jan 2024 07:01  -  03 Jan 2024 07:00\par  --------------------------------------------------------\par  IN: 360 mL / OUT: 0 mL / NET: 360 mL\par  \par  \par  \par  Appearance: Normal\tab\par  HEENT:   Normal oral mucosa, PERRL, EOMI\tab\par  Lymphatic: No lymphadenopathy\par  Cardiovascular: Normal S1 S2, No JVD, No murmurs, No edema\par  Respiratory: Lungs clear to auscultation\tab\par  Psychiatry: A & O x 3, Mood & affect appropriate\par  Gastrointestinal:  Soft, Non-tender, + BS\tab\par  Skin: No rashes, No ecchymoses, No cyanosis\tab\par  Neurologic: Non-focal\par  Extremities: Normal range of motion, No clubbing, cyanosis or edema\par  Vascular: Peripheral pulses palpable 2+ bilaterally\par  \par  MEDICATIONS  (STANDING):\par  albuterol/ipratropium for Nebulization 3 milliLiter(s) Nebulizer every 6 hours\par  atorvastatin 20 milliGRAM(s) Oral at bedtime\par  fluticasone propionate 50 MICROgram(s)/spray Nasal Spray 1 Spray(s) Both Nostrils two times a day\par  guaiFENesin ER 1200 milliGRAM(s) Oral every 12 hours\par  heparin  Infusion. 1000 Unit(s)/Hr (10 mL/Hr) IV Continuous <Continuous>\par  insulin lispro (ADMELOG) corrective regimen sliding scale   SubCutaneous Before meals and at bedtime\par  levoFLOXacin  Tablet 500 milliGRAM(s) Oral every 24 hours\par  metoprolol tartrate 12.5 milliGRAM(s) Oral two times a day\par  sacubitril 24 mG/valsartan 26 mG 1 Tablet(s) Oral two times a day\par  \par  \par  \tab\par  \tab\par  LABS:\tab  \tab\par  \par  \par  \par           \par             14.4 \par  7.64  )-----------( 204      ( 03 Jan 2024 07:55 )\par             42.4 \par  \par  01-03\par  \par  136  |  108  |  19<H>\par  ----------------------------<  91\par  4.5   |  23  |  1.10\par  \par  Ca    8.8      03 Jan 2024 07:55\par  \par  \par  proBNP: \par  Lipid Profile: Cholesterol 132\par  LDL --\par  HDL 47\par  \par  Ldl calc 59\par  Ratio --\par  \par  PT/INR - ( 03 Jan 2024 07:55 )   PT: 21.3 sec;   INR: 1.90 ratio  \par  \par   \par  PTT - ( 03 Jan 2024 07:55 )  PTT:48.5 sec\par  \tab\par  \par  \trowd\mbactd75\eflwpku86\trpaddfl3\ucfoqla18\trpaddfr3\trpaddt0\trpaddft3\trpaddb0\trpaddfb3\trleft0  \clvertalt\wnpqpt20\clpadft3\dcvgbu15\clpadfr3\clpadl0\clpadfl3\clpadb0\clpadfb3\drilq8957  \clvertalt\orfhzz29\clpadft3\ilzwqo78\clpadfr3\clpadl0\clpadfl3\clpadb0\clpadfb3\dkicw6947  \pard\intbl\ssparaaux0\s0\fi-120\li120\ql\plain\f0\fs24{\*\bkmkstart [id number]}{\*\bkmkend [id number]}\plain\f0\fs20\jkbx3805\hich\f0\dbch\f0\loch\f0\fs20 \'b7 \plain\f1\fs20\othg3038\hich\f1\dbch\f1\loch\f1\cf2\fs20\b Note Type\plain\f0\fs20\rgor2575\hich\f0\dbch\f0\loch\f0\fs20\cell  \pard\intbl\ssparaaux0\s0\ql\plain\f0\fs24\plain\f0\fs20\sfwa1798\hich\f0\dbch\f0\loch\f0\fs20 Event Note  BiV ICD interrogation\cell  \intbl\row  \trowd\zhdcno78\lastrow\gxprslq38\trpaddfl3\solxyfg04\trpaddfr3\trpaddt0\trpaddft3\trpaddb0\trpaddfb3\trleft0  \clvertalt\wediph87\clpadft3\yxnubw05\clpadfr3\clpadl0\clpadfl3\clpadb0\clpadfb3\brwfu3842  \clvertalt\qcwmzk17\clpadft3\agxbni08\clpadfr3\clpadl0\clpadfl3\clpadb0\clpadfb3\czmzo1727  \pard\intbl\ssparaaux0\s0\ql\plain\f0\fs24\plain\f0\fs20\wfwt8232\hich\f0\dbch\f0\loch\f0\fs20\cell  \pard\intbl\ssparaaux0\s0\ql\plain\f0\fs24\plain\f1\fs20\bojb0336\hich\f1\dbch\f1\loch\f1\cf2\fs20\strike\plain\f0\fs20\xbvm8353\hich\f0\dbch\f0\loch\f0\fs20\cell  \intbl\row  \pard\ssparaaux0\s0\ql\plain\f0\fs24\plain\f0\fs20\wyge2533\hich\f0\dbch\f0\loch\f0\fs20\par  {\*\bkmkstart [id number]}{\*\bkmkend [id number]}\par  {\*\bkmkstart [id number]}{\*\bkmkend [id number]}ELECTROPHYSIOLOGY\par  \par  Device : RESONATE HF CRT-D G547 ([employer])\par  SN#  [id number]\par  Implant date: 3/10/2022\tab  \par  \par  Estimated battery longevity:  11 years\par  \par  Ventricular Tachy\par  VF        220 bpm    ATP                        41J, 41J, 41J x6\par  VT       185 bpm    Scan   WES9UIT        41J,41J,41J x4  \par  VT-1    150bpm     Monitor Only\par  \par  Atrial Tachy\par  ATR Mode switch      170bpm   VDIR\par  \par  Richard/CRT\par  \par  Mode: DDDR\par  \par  Lower rate/UTR: 60/110bpm\par  \par  Leads:\par  \par  RA\par    Sensing: AGC  0.25 mV\par    Impedance:  690 ohms\par    Threshold: 0.5V@0.4ms\par    Intrinsic Amplitude:  4.1 mV \par    Pacing output / Pulse width:  AUTO 2.4V @0.4 ms\par   \par  RV\par    Sensing: AGC 0.6 mV\par    Impedance:  525 ohms\par    Threshold:  0.5 V @ 0.4ms \par    Intrinsic Amplitude:  N/R\par    Pacing output / Pulse width:  AUTO 2.0V @0.4 ms\par  \par  LV\par    Sensing: AGC 1.0 mV\par    Impedance:  539 ohms\par    Threshold:  1.1 V @ 0.4ms \par    Intrinsic Amplitude:  N/R\par    Pacing output / Pulse width:  AUTO 2.1V @0.4 ms\par  \par  Shock Impedance  67ohms\par  \par  Counters (Since April 26, 2022)\par  A Paced  49%\par  RV Paced  4%\par  LVa Paced   97%\par  LVb Paced 0\par  AT/AF   < 1%\par  \par  \par  Episodes: Multiple episodes of NSVT and PMT at rate of 110bmp.\par  No events on the day of admission.\par  \par  Changes made: None\par  \par  Conclusion: Normal device function.\plain\f1\fs20\psah2398\hich\f1\dbch\f1\loch\f1\cf2\fs20\strike\plain\f1\fs20\pbdk7616\hich\f1\dbch\f1\loch\f1\cf2\fs20\plain\f0\fs20\qytt0354\hich\f0\dbch\f0\loch\f0\fs20\par  \par  \par  {\*\bkmkstart bkClinDocSignatures}{\*\bkmkend bkClinDocSignatures}{\*\bkmkstart bkcommentSBK}{\*\bkmkend bkcommentSBK}\plain\f1\fs20\vtbk3008\hich\f1\dbch\f1\loch\f1\cf2\fs20\b Electronic Signatures:\plain\f0\fs20\awrs3691\hich\f0\dbch\f0\loch\f0\fs20\par  \plain\f1\fs20\vqfu2392\hich\f1\dbch\f1\loch\f1\cf2\fs20\b\ul Mosheyeva, Audra (NP)\plain\f0\fs20\wckk0071\hich\f0\dbch\f0\loch\f0\fs20   \plain\f1\fs18\xfzs1782\hich\f1\dbch\f1\loch\f1\cf2\fs18 (Signed 03-Jan-2024 06:33)\par  \fi-360\li720\plain\f0\fs24\plain\f1\fs18\xguc2932\hich\f1\dbch\f1\loch\f1\cf2\fs18\tab\plain\f1\fs20\xjqs6658\hich\f1\dbch\f1\loch\f1\cf2\fs20\b\i Authored: \plain\f1\fs20\rdgj0390\hich\f1\dbch\f1\loch\f1\cf2\fs20\i Note Type, Note\plain\f0\fs20\vnxu4866\hich\f0\dbch\f0\loch\f0\fs20\par  \fi0\li0\plain\f0\fs24\plain\f0\fs20\[id number]\hich\f0\dbch\f0\loch\f0\fs20\par  \par  \plain\f1\fs20\[id number]\hich\f1\dbch\f1\loch\f1\cf2\fs20\b\i Last Updated: \plain\f1\fs20\[id number]\hich\f1\dbch\f1\loch\f1\cf2\fs20\i 03-Jan-2024 06:33 by Yelitza Eucedaonora (NP)\plain\f0\fs20\[id number]\hich\f0\dbch\f0\loch\f0\fs20\par  \pard\plain\f0\fs24\plain\f0\fs20\[id number]\hich\f0\dbch\f0\loch\f0\fs20\par  < from: TTE W or WO Ultrasound Enhancing Agent (01.01.24 @ 11:26) >\par  \ql\plain\f0\fs24\plain\f1\fs16\[id number]\hich\f1\dbch\f1\loch\f1\cf2\fs16 CONCLUSIONS:\par   \par   1. The left atrium is mildly dilated\par   2. 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 Acceleration time of 93 msec and an effictive orifice area indexed to BSA of 0.7 cm2/m2. These findings suggest a high flow state.\par   3. Left ventricular endocardium is not well visualized; however, the left ventricular systolic function appears grossly normal.\par   4. There is increased LV mass and concentric hypertrophy.\par   5. There is mild (grade 1) left ventricular diastolicdysfunction.\par   6. Normal right ventricular cavity size, wall thickness, and systolic function.\par  \pard\plain\f0\fs24\plain\f1\fs16\[id number]\hich\f1\dbch\f1\loch\f1\cf2\fs16  7. Device lead is visualized.\par  \par  < end of copied text >\par  \ql\plain\f0\fs24\plain\f0\fs20\[id number]\hich\f0\dbch\f0\loch\f0\fs20\par  }

## 2024-01-03 NOTE — PROGRESS NOTE ADULT - PROBLEM SELECTOR PLAN 2
P/w cough and bilateral wheeze.   RVP negative x 2  CT HEAD: Moderate mucosal thickening BILATERAL ethmoid sinuses and secretions in the RIGHT sphenoid sinus.  CT chest:  Findings suggestive of mucus plugging at the lung bases.   C/w: Flonase, Mucinex. Levaquin ( day 5/ 7)

## 2024-01-03 NOTE — PROGRESS NOTE ADULT - PROBLEM SELECTOR PLAN 4
No chest pain.   Trop [id number]   EKG - AV dual paced rhythm.   echo noted  stress test in AM- 1/5/24  Cynthia Toure following

## 2024-01-03 NOTE — PROGRESS NOTE ADULT - ASSESSMENT
Mri brain shows that her chiari I malformation is stable.  This is good news.     Patient is a 77 y/o M with PMH of DM, HTN, HLD, PAF, CHF, PPM and defibrillator who presented after an episode of syncope. Patient also complains of severe cough for the past 4 days. Patient is being admitted for further management and work up of syncope.    Patient is a 79 y/o M with PMH of DM, HTN, HLD, PAF, CHF, PPM and defibrillator who presented after an episode of syncope. Patient also complains of severe cough for the past 4 days. Patient is being admitted for further management and work up of syncope.

## 2024-01-03 NOTE — PROGRESS NOTE ADULT - SUBJECTIVE AND OBJECTIVE BOX
Patient is a 78y old  Male who presents with a chief complaint of Syncope,Rib fx (03 Jan 2024 10:15)  Awake, alert, comfortable out of bed in NAD. Feeling better. Cough improved    INTERVAL HPI/OVERNIGHT EVENTS:      VITAL SIGNS:  T(F): 98.4 (01-03-24 @ 07:25)  HR: 62 (01-03-24 @ 07:25)  BP: 103/63 (01-03-24 @ 07:25)  RR: 16 (01-03-24 @ 09:24)  SpO2: 95% (01-03-24 @ 09:24)  Wt(kg): --  I&O's Detail    02 Jan 2024 07:01  -  03 Jan 2024 07:00  --------------------------------------------------------  IN:    Heparin Infusion: 60 mL    IV PiggyBack: 300 mL  Total IN: 360 mL    OUT:  Total OUT: 0 mL    Total NET: 360 mL              REVIEW OF SYSTEMS:    CONSTITUTIONAL:  No fevers, chills, sweats    HEENT:  Eyes:  No diplopia or blurred vision. ENT:  No earache, sore throat or runny nose.    CARDIOVASCULAR:  No pressure, squeezing, tightness, or heaviness about the chest; no palpitations.    RESPIRATORY:  Per HPI    GASTROINTESTINAL:  No abdominal pain, nausea, vomiting or diarrhea.    GENITOURINARY:  No dysuria, frequency or urgency.    NEUROLOGIC:  No paresthesias, fasciculations, seizures or weakness.    PSYCHIATRIC:  No disorder of thought or mood.      PHYSICAL EXAM:    Constitutional: Well developed and nourished  Eyes:Perrla  ENMT: normal  Neck:supple  Respiratory: Rhonchi Post  Cardiovascular: S1 S2 regular  Gastrointestinal: Soft, Non tender  Extremities: No edema  Vascular:normal  Neurological:Awake, alert,Ox3  Musculoskeletal:Normal      MEDICATIONS  (STANDING):  atorvastatin 20 milliGRAM(s) Oral at bedtime  fluticasone propionate 50 MICROgram(s)/spray Nasal Spray 1 Spray(s) Both Nostrils two times a day  guaiFENesin ER 1200 milliGRAM(s) Oral every 12 hours  heparin  Infusion. 1000 Unit(s)/Hr (10 mL/Hr) IV Continuous <Continuous>  insulin lispro (ADMELOG) corrective regimen sliding scale   SubCutaneous Before meals and at bedtime  levoFLOXacin  Tablet 500 milliGRAM(s) Oral every 24 hours  metoprolol tartrate 12.5 milliGRAM(s) Oral two times a day  sacubitril 24 mG/valsartan 26 mG 1 Tablet(s) Oral two times a day  warfarin 7 milliGRAM(s) Oral at bedtime    MEDICATIONS  (PRN):  acetaminophen     Tablet .. 650 milliGRAM(s) Oral every 6 hours PRN Temp greater or equal to 38C (100.4F), Mild Pain (1 - 3)  albuterol/ipratropium for Nebulization 3 milliLiter(s) Nebulizer every 6 hours PRN Shortness of Breath and/or Wheezing  ondansetron Injectable 4 milliGRAM(s) IV Push every 8 hours PRN Nausea and/or Vomiting  oxyCODONE    IR 5 milliGRAM(s) Oral every 6 hours PRN Moderate Pain (4 - 6)      Allergies    No Known Allergies    Intolerances        LABS:                        14.4   7.64  )-----------( 204      ( 03 Jan 2024 07:55 )             42.4     01-03    136  |  108  |  19<H>  ----------------------------<  91  4.5   |  23  |  1.10    Ca    8.8      03 Jan 2024 07:55      PT/INR - ( 03 Jan 2024 07:55 )   PT: 21.3 sec;   INR: 1.90 ratio         PTT - ( 03 Jan 2024 07:55 )  PTT:48.5 sec  Urinalysis Basic - ( 03 Jan 2024 07:55 )    Color: x / Appearance: x / SG: x / pH: x  Gluc: 91 mg/dL / Ketone: x  / Bili: x / Urobili: x   Blood: x / Protein: x / Nitrite: x   Leuk Esterase: x / RBC: x / WBC x   Sq Epi: x / Non Sq Epi: x / Bacteria: x            CAPILLARY BLOOD GLUCOSE      POCT Blood Glucose.: 99 mg/dL (03 Jan 2024 08:05)  POCT Blood Glucose.: 139 mg/dL (02 Jan 2024 21:04)  POCT Blood Glucose.: 104 mg/dL (02 Jan 2024 16:43)  POCT Blood Glucose.: 98 mg/dL (02 Jan 2024 11:57)        RADIOLOGY & ADDITIONAL TESTS:    CXR:    Ct scan chest:    ekg;    echo:< from: TTE W or WO Ultrasound Enhancing Agent (01.01.24 @ 11:26) >     CONCLUSIONS:      1. The left atrium is mildly dilated   2. 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 Acceleration time of 93 msec and an effictive orifice area indexed to BSA of 0.7 cm2/m2. These findings suggest a high flow state.   3. Left ventricular endocardium is not well visualized; however, the left ventricular systolic function appears grossly normal.   4. There is increased LV mass and concentric hypertrophy.   5. There is mild (grade 1) left ventricular diastolicdysfunction.   6. Normal right ventricular cavity size, wall thickness, and systolic function.   7. Device lead is visualized.      < end of copied text >

## 2024-01-03 NOTE — PROGRESS NOTE ADULT - ASSESSMENT
77 y/o M with PMH of DM, HTN, HLD, PAF, CHF, S/P defibrillator,S/P CABG and AVR  who presented after an episode of syncope,acute lt sided rib fx,sinusitis,BEVERLY.  1.D/C Tele monitoring.  2.Interrogation of  AICD as above.  3.Echocardiogram as above..  4.PAF,mech AVR-coumadin and heparin drip until INR >2.5.  5.Borderline troponins-Stress test in am.  6.CHF by history-b blocker.Entresto.  7.CAD-statin,lopressor.  8.BEVERLY-resolved..

## 2024-01-03 NOTE — PROGRESS NOTE ADULT - SUBJECTIVE AND OBJECTIVE BOX
Patient is a 78y old  Male who presents with a chief complaint of NSTEMI (02 Jan 2024 09:54)    pt seen in ed tele [ x ], reg med floor [   ], bed [ x ], chair at bedside [   ], a+ o x3 [ x ],   lethargic [  ],  nad [ x ]        Allergies    No Known Allergies        Vitals    T(F): 98.6 (01-03-24 @ 04:52), Max: 99 (01-02-24 @ 23:55)  HR: 65 (01-03-24 @ 04:52) (59 - 67)  BP: 113/54 (01-03-24 @ 04:52) (109/51 - 156/63)  RR: 18 (01-03-24 @ 04:52) (18 - 18)  SpO2: 95% (01-03-24 @ 04:52) (92% - 97%)  Wt(kg): --  CAPILLARY BLOOD GLUCOSE      POCT Blood Glucose.: 139 mg/dL (02 Jan 2024 21:04)      Labs                          14.9   7.89  )-----------( 191      ( 02 Jan 2024 05:25 )             45.2       01-02    139  |  109<H>  |  16  ----------------------------<  97  2.9<LL>   |  26  |  1.00    Ca    6.8<L>      02 Jan 2024 12:55                  Radiology Results      Meds    MEDICATIONS  (STANDING):  albuterol/ipratropium for Nebulization 3 milliLiter(s) Nebulizer every 6 hours  atorvastatin 20 milliGRAM(s) Oral at bedtime  fluticasone propionate 50 MICROgram(s)/spray Nasal Spray 1 Spray(s) Both Nostrils two times a day  guaiFENesin ER 1200 milliGRAM(s) Oral every 12 hours  heparin  Infusion. 1000 Unit(s)/Hr (10 mL/Hr) IV Continuous <Continuous>  insulin lispro (ADMELOG) corrective regimen sliding scale   SubCutaneous Before meals and at bedtime  levoFLOXacin  Tablet 500 milliGRAM(s) Oral every 24 hours  metoprolol tartrate 12.5 milliGRAM(s) Oral two times a day  sacubitril 24 mG/valsartan 26 mG 1 Tablet(s) Oral two times a day      MEDICATIONS  (PRN):  acetaminophen     Tablet .. 650 milliGRAM(s) Oral every 6 hours PRN Temp greater or equal to 38C (100.4F), Mild Pain (1 - 3)  ondansetron Injectable 4 milliGRAM(s) IV Push every 8 hours PRN Nausea and/or Vomiting  oxyCODONE    IR 5 milliGRAM(s) Oral every 6 hours PRN Moderate Pain (4 - 6)      Physical Exam    Neuro :  no focal deficits  Respiratory: CTA B/L  CV: RRR, S1S2, no murmurs,   Abdominal: Soft, NT, ND +BS,  Extremities: No edema, + peripheral pulses    ASSESSMENT    syncope pos 2nd to severe cough,   cns patho r/o,   troponinemia,   r/o acs,   right rib pain 2nd to fx,   s/p fall,   sinusitis,   cindy   h/o htn,   chf,   ppm,   dm,   PAF   aortic valve replacement (? mechanical)      PLAN    cont tele,   aspirin, statin,   neuro cons noted   Syncope in patient with dizziness and +jose orthostatics cw orthostatic syncope.  orthostatic bp q shift   trop x 4 positive noted above   cont coumadin   inr subtherapeutic noted above   goal inr 2.5 to 3.5   start hep drip as per hospital normogram  cardio f/u   resumed Entresto   Interrogate AICD.  cont statin, lopressor.  f/u echo   pulm f/u    robitussin,   albuterol,   flonase nasal spray,   levaquin, 500mg daily, to complete 7 days   f/u bmp   lispro ss,   hgba1c 6.2 noted    phys tx eval  cont current meds          Patient is a 78y old  Male who presents with a chief complaint of NSTEMI (02 Jan 2024 09:54)    pt seen in tele [ x ], reg med floor [   ], bed [ x ], chair at bedside [   ], a+ o x3 [ x ],   lethargic [  ],  nad [ x ]        Allergies    No Known Allergies        Vitals    T(F): 98.6 (01-03-24 @ 04:52), Max: 99 (01-02-24 @ 23:55)  HR: 65 (01-03-24 @ 04:52) (59 - 67)  BP: 113/54 (01-03-24 @ 04:52) (109/51 - 156/63)  RR: 18 (01-03-24 @ 04:52) (18 - 18)  SpO2: 95% (01-03-24 @ 04:52) (92% - 97%)  Wt(kg): --  CAPILLARY BLOOD GLUCOSE      POCT Blood Glucose.: 139 mg/dL (02 Jan 2024 21:04)      Labs                          14.9   7.89  )-----------( 191      ( 02 Jan 2024 05:25 )             45.2       01-02    139  |  109<H>  |  16  ----------------------------<  97  2.9<LL>   |  26  |  1.00    Ca    6.8<L>      02 Jan 2024 12:55    Prothrombin Time and INR, Plasma in AM (01.03.24 @ 07:55)   Prothrombin Time, Plasma: 21.3 sec  INR: 1.90:      < from: TTE W or WO Ultrasound Enhancing Agent (01.01.24 @ 11:26) >  CONCLUSIONS:      1. The left atrium is mildly dilated   2. 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 Acceleration time of 93 msec and an effictive orifice area indexed to BSA of 0.7 cm2/m2. These findings suggest a high flow state.   3. Left ventricular endocardium is not well visualized; however, the left ventricular systolic function appears grossly normal.   4. There is increased LV mass and concentric hypertrophy.   5. There is mild (grade 1) left ventricular diastolicdysfunction.   6. Normal right ventricular cavity size, wall thickness, and systolic function.   7. Device lead is visualized.    < end of copied text >          Radiology Results      Meds    MEDICATIONS  (STANDING):  albuterol/ipratropium for Nebulization 3 milliLiter(s) Nebulizer every 6 hours  atorvastatin 20 milliGRAM(s) Oral at bedtime  fluticasone propionate 50 MICROgram(s)/spray Nasal Spray 1 Spray(s) Both Nostrils two times a day  guaiFENesin ER 1200 milliGRAM(s) Oral every 12 hours  heparin  Infusion. 1000 Unit(s)/Hr (10 mL/Hr) IV Continuous <Continuous>  insulin lispro (ADMELOG) corrective regimen sliding scale   SubCutaneous Before meals and at bedtime  levoFLOXacin  Tablet 500 milliGRAM(s) Oral every 24 hours  metoprolol tartrate 12.5 milliGRAM(s) Oral two times a day  sacubitril 24 mG/valsartan 26 mG 1 Tablet(s) Oral two times a day      MEDICATIONS  (PRN):  acetaminophen     Tablet .. 650 milliGRAM(s) Oral every 6 hours PRN Temp greater or equal to 38C (100.4F), Mild Pain (1 - 3)  ondansetron Injectable 4 milliGRAM(s) IV Push every 8 hours PRN Nausea and/or Vomiting  oxyCODONE    IR 5 milliGRAM(s) Oral every 6 hours PRN Moderate Pain (4 - 6)      Physical Exam    Neuro :  no focal deficits  Respiratory: CTA B/L  CV: RRR, S1S2, no murmurs,   Abdominal: Soft, NT, ND +BS,  Extremities: No edema, + peripheral pulses    ASSESSMENT    syncope pos 2nd to severe cough,   cns patho r/o,   troponinemia,   r/o acs,   right rib pain 2nd to fx,   s/p fall,   sinusitis,   cindy   h/o htn,   chf,   ppm,   dm,   PAF   aortic valve replacement (? mechanical)      PLAN    cont tele,   statin,   neuro cons noted   Syncope in patient with dizziness and +jose orthostatics cw orthostatic syncope.  orthostatic bp q shift   trop x 4 positive noted above   incr coumadin to 7mg qhs  inr subtherapeutic noted above   goal inr 2.5 to 3.5   cont hep drip as per hospital normogram  cardio f/u   cont Entresto   AICD interrogated with Episodes: Multiple episodes of NSVT and PMT at rate of 110bmp.  No events on the day of admission. noChanges made: Normal device function.  cont statin, lopressor.  echo with the left ventricular systolic function appears grossly normal.   There is mild (grade 1) left ventricular diastolic dysfunction noted above.    stress test in ampulm f/u    robitussin, albuterol,   flonase nasal spray,   levaquin, 500mg daily, to complete 7 days   serum potassium wnl   lispro ss,   hgba1c 6.2 noted    phys tx eval noted and rec No skilled PT needs  cont current meds

## 2024-01-04 LAB
ANION GAP SERPL CALC-SCNC: 4 MMOL/L — LOW (ref 5–17)
ANION GAP SERPL CALC-SCNC: 4 MMOL/L — LOW (ref 5–17)
APTT BLD: 78.3 SEC — HIGH (ref 24.5–35.6)
APTT BLD: 78.3 SEC — HIGH (ref 24.5–35.6)
BUN SERPL-MCNC: 23 MG/DL — HIGH (ref 7–18)
BUN SERPL-MCNC: 23 MG/DL — HIGH (ref 7–18)
CALCIUM SERPL-MCNC: 9.1 MG/DL — SIGNIFICANT CHANGE UP (ref 8.4–10.5)
CALCIUM SERPL-MCNC: 9.1 MG/DL — SIGNIFICANT CHANGE UP (ref 8.4–10.5)
CHLORIDE SERPL-SCNC: 103 MMOL/L — SIGNIFICANT CHANGE UP (ref 96–108)
CHLORIDE SERPL-SCNC: 103 MMOL/L — SIGNIFICANT CHANGE UP (ref 96–108)
CO2 SERPL-SCNC: 27 MMOL/L — SIGNIFICANT CHANGE UP (ref 22–31)
CO2 SERPL-SCNC: 27 MMOL/L — SIGNIFICANT CHANGE UP (ref 22–31)
CREAT SERPL-MCNC: 1.31 MG/DL — HIGH (ref 0.5–1.3)
CREAT SERPL-MCNC: 1.31 MG/DL — HIGH (ref 0.5–1.3)
EGFR: 56 ML/MIN/1.73M2 — LOW
EGFR: 56 ML/MIN/1.73M2 — LOW
GLUCOSE BLDC GLUCOMTR-MCNC: 76 MG/DL — SIGNIFICANT CHANGE UP (ref 70–99)
GLUCOSE BLDC GLUCOMTR-MCNC: 76 MG/DL — SIGNIFICANT CHANGE UP (ref 70–99)
GLUCOSE BLDC GLUCOMTR-MCNC: 94 MG/DL — SIGNIFICANT CHANGE UP (ref 70–99)
GLUCOSE BLDC GLUCOMTR-MCNC: 94 MG/DL — SIGNIFICANT CHANGE UP (ref 70–99)
GLUCOSE BLDC GLUCOMTR-MCNC: 95 MG/DL — SIGNIFICANT CHANGE UP (ref 70–99)
GLUCOSE SERPL-MCNC: 93 MG/DL — SIGNIFICANT CHANGE UP (ref 70–99)
GLUCOSE SERPL-MCNC: 93 MG/DL — SIGNIFICANT CHANGE UP (ref 70–99)
HCT VFR BLD CALC: 41.3 % — SIGNIFICANT CHANGE UP (ref 39–50)
HCT VFR BLD CALC: 41.3 % — SIGNIFICANT CHANGE UP (ref 39–50)
HGB BLD-MCNC: 13.8 G/DL — SIGNIFICANT CHANGE UP (ref 13–17)
HGB BLD-MCNC: 13.8 G/DL — SIGNIFICANT CHANGE UP (ref 13–17)
INR BLD: 1.63 RATIO — HIGH (ref 0.85–1.18)
INR BLD: 1.63 RATIO — HIGH (ref 0.85–1.18)
MAGNESIUM SERPL-MCNC: 1.4 MG/DL — LOW (ref 1.6–2.6)
MAGNESIUM SERPL-MCNC: 1.4 MG/DL — LOW (ref 1.6–2.6)
MCHC RBC-ENTMCNC: 31.6 PG — SIGNIFICANT CHANGE UP (ref 27–34)
MCHC RBC-ENTMCNC: 31.6 PG — SIGNIFICANT CHANGE UP (ref 27–34)
MCHC RBC-ENTMCNC: 33.4 GM/DL — SIGNIFICANT CHANGE UP (ref 32–36)
MCHC RBC-ENTMCNC: 33.4 GM/DL — SIGNIFICANT CHANGE UP (ref 32–36)
MCV RBC AUTO: 94.5 FL — SIGNIFICANT CHANGE UP (ref 80–100)
MCV RBC AUTO: 94.5 FL — SIGNIFICANT CHANGE UP (ref 80–100)
NRBC # BLD: 0 /100 WBCS — SIGNIFICANT CHANGE UP (ref 0–0)
NRBC # BLD: 0 /100 WBCS — SIGNIFICANT CHANGE UP (ref 0–0)
PLATELET # BLD AUTO: 210 K/UL — SIGNIFICANT CHANGE UP (ref 150–400)
PLATELET # BLD AUTO: 210 K/UL — SIGNIFICANT CHANGE UP (ref 150–400)
POTASSIUM SERPL-MCNC: 4.1 MMOL/L — SIGNIFICANT CHANGE UP (ref 3.5–5.3)
POTASSIUM SERPL-MCNC: 4.1 MMOL/L — SIGNIFICANT CHANGE UP (ref 3.5–5.3)
POTASSIUM SERPL-SCNC: 4.1 MMOL/L — SIGNIFICANT CHANGE UP (ref 3.5–5.3)
POTASSIUM SERPL-SCNC: 4.1 MMOL/L — SIGNIFICANT CHANGE UP (ref 3.5–5.3)
PROTHROM AB SERPL-ACNC: 18.3 SEC — HIGH (ref 9.5–13)
PROTHROM AB SERPL-ACNC: 18.3 SEC — HIGH (ref 9.5–13)
RBC # BLD: 4.37 M/UL — SIGNIFICANT CHANGE UP (ref 4.2–5.8)
RBC # BLD: 4.37 M/UL — SIGNIFICANT CHANGE UP (ref 4.2–5.8)
RBC # FLD: 13.7 % — SIGNIFICANT CHANGE UP (ref 10.3–14.5)
RBC # FLD: 13.7 % — SIGNIFICANT CHANGE UP (ref 10.3–14.5)
SODIUM SERPL-SCNC: 134 MMOL/L — LOW (ref 135–145)
SODIUM SERPL-SCNC: 134 MMOL/L — LOW (ref 135–145)
WBC # BLD: 8.91 K/UL — SIGNIFICANT CHANGE UP (ref 3.8–10.5)
WBC # BLD: 8.91 K/UL — SIGNIFICANT CHANGE UP (ref 3.8–10.5)
WBC # FLD AUTO: 8.91 K/UL — SIGNIFICANT CHANGE UP (ref 3.8–10.5)
WBC # FLD AUTO: 8.91 K/UL — SIGNIFICANT CHANGE UP (ref 3.8–10.5)

## 2024-01-04 RX ORDER — PANTOPRAZOLE SODIUM 20 MG/1
40 TABLET, DELAYED RELEASE ORAL
Refills: 0 | Status: DISCONTINUED | OUTPATIENT
Start: 2024-01-04 | End: 2024-01-08

## 2024-01-04 RX ADMIN — PANTOPRAZOLE SODIUM 40 MILLIGRAM(S): 20 TABLET, DELAYED RELEASE ORAL at 17:28

## 2024-01-04 RX ADMIN — Medication 1 SPRAY(S): at 17:30

## 2024-01-04 RX ADMIN — HEPARIN SODIUM 800 UNIT(S)/HR: 5000 INJECTION INTRAVENOUS; SUBCUTANEOUS at 07:04

## 2024-01-04 RX ADMIN — SACUBITRIL AND VALSARTAN 1 TABLET(S): 24; 26 TABLET, FILM COATED ORAL at 17:28

## 2024-01-04 RX ADMIN — ATORVASTATIN CALCIUM 20 MILLIGRAM(S): 80 TABLET, FILM COATED ORAL at 21:31

## 2024-01-04 RX ADMIN — WARFARIN SODIUM 7 MILLIGRAM(S): 2.5 TABLET ORAL at 21:32

## 2024-01-04 RX ADMIN — SENNA PLUS 2 TABLET(S): 8.6 TABLET ORAL at 21:31

## 2024-01-04 RX ADMIN — HEPARIN SODIUM 800 UNIT(S)/HR: 5000 INJECTION INTRAVENOUS; SUBCUTANEOUS at 04:28

## 2024-01-04 RX ADMIN — Medication 12.5 MILLIGRAM(S): at 17:28

## 2024-01-04 RX ADMIN — Medication 12.5 MILLIGRAM(S): at 05:28

## 2024-01-04 RX ADMIN — HEPARIN SODIUM 800 UNIT(S)/HR: 5000 INJECTION INTRAVENOUS; SUBCUTANEOUS at 08:04

## 2024-01-04 RX ADMIN — Medication 1200 MILLIGRAM(S): at 05:27

## 2024-01-04 RX ADMIN — Medication 1200 MILLIGRAM(S): at 17:27

## 2024-01-04 RX ADMIN — SACUBITRIL AND VALSARTAN 1 TABLET(S): 24; 26 TABLET, FILM COATED ORAL at 05:27

## 2024-01-04 RX ADMIN — Medication 1 SPRAY(S): at 05:27

## 2024-01-04 RX ADMIN — HEPARIN SODIUM 800 UNIT(S)/HR: 5000 INJECTION INTRAVENOUS; SUBCUTANEOUS at 19:01

## 2024-01-04 RX ADMIN — POLYETHYLENE GLYCOL 3350 17 GRAM(S): 17 POWDER, FOR SOLUTION ORAL at 11:55

## 2024-01-04 NOTE — PROGRESS NOTE ADULT - PROBLEM SELECTOR PLAN 1
P/w syncope   RVP negative x 2   CT HEAD as above  Echo noted  +orthostatic bp  ICD interrogated 1/2/24 with normal device function  s/p tele.   PT recc NO SKILLED NEEDS  stress test done- 1/5/24  Consulted cardiology - Dr. Toure.   Consulted neurology - Dr. Coffey

## 2024-01-04 NOTE — PROGRESS NOTE ADULT - PROBLEM SELECTOR PLAN 4
No chest pain.   Trop [id number]   EKG - AV dual paced rhythm.   echo noted  stress test done  Cards Tejal

## 2024-01-04 NOTE — PROGRESS NOTE ADULT - SUBJECTIVE AND OBJECTIVE BOX
Patient is a 78y old  Male who presents with a chief complaint of Syncope (04 Jan 2024 09:47)  Awake, alert, comfortable in bed in NAD. Clinically improved. Having stress test today    INTERVAL HPI/OVERNIGHT EVENTS:      VITAL SIGNS:  T(F): 98.4 (01-04-24 @ 11:30)  HR: 86 (01-04-24 @ 11:30)  BP: 116/66 (01-04-24 @ 11:30)  RR: 18 (01-04-24 @ 11:30)  SpO2: 94% (01-04-24 @ 11:30)  Wt(kg): --  I&O's Detail          REVIEW OF SYSTEMS:    CONSTITUTIONAL:  No fevers, chills, sweats    HEENT:  Eyes:  No diplopia or blurred vision. ENT:  No earache, sore throat or runny nose.    CARDIOVASCULAR:  No pressure, squeezing, tightness, or heaviness about the chest; no palpitations.    RESPIRATORY:  Per HPI    GASTROINTESTINAL:  No abdominal pain, nausea, vomiting or diarrhea.    GENITOURINARY:  No dysuria, frequency or urgency.    NEUROLOGIC:  No paresthesias, fasciculations, seizures or weakness.    PSYCHIATRIC:  No disorder of thought or mood.      PHYSICAL EXAM:    Constitutional: Well developed and nourished  Eyes:Perrla  ENMT: normal  Neck:supple  Respiratory: good air entry  Cardiovascular: S1 S2 regular  Gastrointestinal: Soft, Non tender  Extremities: No edema  Vascular:normal  Neurological:Awake, alert,Ox3  Musculoskeletal:Normal      MEDICATIONS  (STANDING):  atorvastatin 20 milliGRAM(s) Oral at bedtime  fluticasone propionate 50 MICROgram(s)/spray Nasal Spray 1 Spray(s) Both Nostrils two times a day  guaiFENesin ER 1200 milliGRAM(s) Oral every 12 hours  heparin  Infusion. 1000 Unit(s)/Hr (10 mL/Hr) IV Continuous <Continuous>  insulin lispro (ADMELOG) corrective regimen sliding scale   SubCutaneous Before meals and at bedtime  metoprolol tartrate 12.5 milliGRAM(s) Oral two times a day  polyethylene glycol 3350 17 Gram(s) Oral daily  sacubitril 24 mG/valsartan 26 mG 1 Tablet(s) Oral two times a day  senna 2 Tablet(s) Oral at bedtime  warfarin 7 milliGRAM(s) Oral at bedtime    MEDICATIONS  (PRN):  acetaminophen     Tablet .. 650 milliGRAM(s) Oral every 6 hours PRN Temp greater or equal to 38C (100.4F), Mild Pain (1 - 3)  albuterol/ipratropium for Nebulization 3 milliLiter(s) Nebulizer every 6 hours PRN Shortness of Breath and/or Wheezing  ondansetron Injectable 4 milliGRAM(s) IV Push every 8 hours PRN Nausea and/or Vomiting  oxyCODONE    IR 5 milliGRAM(s) Oral every 6 hours PRN Moderate Pain (4 - 6)      Allergies    No Known Allergies    Intolerances        LABS:                        13.8   8.91  )-----------( 210      ( 04 Jan 2024 07:26 )             41.3     01-04    134<L>  |  103  |  23<H>  ----------------------------<  93  4.1   |  27  |  1.31<H>    Ca    9.1      04 Jan 2024 07:26  Mg     1.4     01-04      PT/INR - ( 04 Jan 2024 07:26 )   PT: 18.3 sec;   INR: 1.63 ratio         PTT - ( 04 Jan 2024 03:33 )  PTT:78.3 sec  Urinalysis Basic - ( 04 Jan 2024 07:26 )    Color: x / Appearance: x / SG: x / pH: x  Gluc: 93 mg/dL / Ketone: x  / Bili: x / Urobili: x   Blood: x / Protein: x / Nitrite: x   Leuk Esterase: x / RBC: x / WBC x   Sq Epi: x / Non Sq Epi: x / Bacteria: x            CAPILLARY BLOOD GLUCOSE      POCT Blood Glucose.: 76 mg/dL (04 Jan 2024 07:32)  POCT Blood Glucose.: 104 mg/dL (03 Jan 2024 20:56)  POCT Blood Glucose.: 89 mg/dL (03 Jan 2024 16:55)  POCT Blood Glucose.: 103 mg/dL (03 Jan 2024 12:01)        RADIOLOGY & ADDITIONAL TESTS:    CXR:    Ct scan chest:    ekg;    echo:

## 2024-01-04 NOTE — PROGRESS NOTE ADULT - SUBJECTIVE AND OBJECTIVE BOX
Patient is a 78y old  Male who presents with a chief complaint of syncope (03 Jan 2024 11:18)    pt seen in tele [ x ], reg med floor [   ], bed [ x ], chair at bedside [   ], a+ o x3 [ x ],   lethargic [  ],  nad [ x ]        Allergies    No Known Allergies        Vitals    T(F): 97.3 (01-04-24 @ 05:00), Max: 98.6 (01-04-24 @ 00:00)  HR: 62 (01-04-24 @ 05:00) (61 - 65)  BP: 104/52 (01-04-24 @ 05:00) (100/51 - 119/59)  RR: 18 (01-04-24 @ 05:00) (16 - 18)  SpO2: 96% (01-04-24 @ 05:00) (93% - 96%)  Wt(kg): --  CAPILLARY BLOOD GLUCOSE      POCT Blood Glucose.: 104 mg/dL (03 Jan 2024 20:56)      Labs                          14.4   7.64  )-----------( 204      ( 03 Jan 2024 07:55 )             42.4       01-03    136  |  108  |  19<H>  ----------------------------<  91  4.5   |  23  |  1.10    Ca    8.8      03 Jan 2024 07:55                  Radiology Results      Meds    MEDICATIONS  (STANDING):  atorvastatin 20 milliGRAM(s) Oral at bedtime  fluticasone propionate 50 MICROgram(s)/spray Nasal Spray 1 Spray(s) Both Nostrils two times a day  guaiFENesin ER 1200 milliGRAM(s) Oral every 12 hours  heparin  Infusion. 1000 Unit(s)/Hr (10 mL/Hr) IV Continuous <Continuous>  insulin lispro (ADMELOG) corrective regimen sliding scale   SubCutaneous Before meals and at bedtime  levoFLOXacin  Tablet 500 milliGRAM(s) Oral every 24 hours  metoprolol tartrate 12.5 milliGRAM(s) Oral two times a day  polyethylene glycol 3350 17 Gram(s) Oral daily  sacubitril 24 mG/valsartan 26 mG 1 Tablet(s) Oral two times a day  senna 2 Tablet(s) Oral at bedtime  warfarin 7 milliGRAM(s) Oral at bedtime      MEDICATIONS  (PRN):  acetaminophen     Tablet .. 650 milliGRAM(s) Oral every 6 hours PRN Temp greater or equal to 38C (100.4F), Mild Pain (1 - 3)  albuterol/ipratropium for Nebulization 3 milliLiter(s) Nebulizer every 6 hours PRN Shortness of Breath and/or Wheezing  ondansetron Injectable 4 milliGRAM(s) IV Push every 8 hours PRN Nausea and/or Vomiting  oxyCODONE    IR 5 milliGRAM(s) Oral every 6 hours PRN Moderate Pain (4 - 6)      Physical Exam    Neuro :  no focal deficits  Respiratory: CTA B/L  CV: RRR, S1S2, no murmurs,   Abdominal: Soft, NT, ND +BS,  Extremities: No edema, + peripheral pulses    ASSESSMENT    syncope pos 2nd to severe cough,   cns patho r/o,   troponinemia,   r/o acs,   right rib pain 2nd to fx,   s/p fall,   sinusitis,   cindy   h/o htn,   chf,   ppm,   dm,   PAF   aortic valve replacement (? mechanical)      PLAN    cont tele,   statin,   neuro cons noted   Syncope in patient with dizziness and +jose orthostatics cw orthostatic syncope.  orthostatic bp q shift   trop x 4 positive noted above   incr coumadin to 7mg qhs  inr subtherapeutic noted above   goal inr 2.5 to 3.5   cont hep drip as per hospital normogram  cardio f/u   cont Entresto   AICD interrogated with Episodes: Multiple episodes of NSVT and PMT at rate of 110bmp.  No events on the day of admission. noChanges made: Normal device function.  cont statin, lopressor.  echo with the left ventricular systolic function appears grossly normal.   There is mild (grade 1) left ventricular diastolic dysfunction noted above.    stress test in ampulm f/u    robitussin, albuterol,   flonase nasal spray,   levaquin, 500mg daily, to complete 7 days   serum potassium wnl   lispro ss,   hgba1c 6.2 noted    phys tx eval noted and rec No skilled PT needs  cont current meds            Patient is a 78y old  Male who presents with a chief complaint of syncope (03 Jan 2024 11:18)    pt seen in tele [ x ], reg med floor [   ], bed [ x ], chair at bedside [   ], a+ o x3 [ x ],   lethargic [  ],  nad [ x ]        Allergies    No Known Allergies        Vitals    T(F): 97.3 (01-04-24 @ 05:00), Max: 98.6 (01-04-24 @ 00:00)  HR: 62 (01-04-24 @ 05:00) (61 - 65)  BP: 104/52 (01-04-24 @ 05:00) (100/51 - 119/59)  RR: 18 (01-04-24 @ 05:00) (16 - 18)  SpO2: 96% (01-04-24 @ 05:00) (93% - 96%)  Wt(kg): --  CAPILLARY BLOOD GLUCOSE      POCT Blood Glucose.: 104 mg/dL (03 Jan 2024 20:56)      Labs                          14.4   7.64  )-----------( 204      ( 03 Jan 2024 07:55 )             42.4       01-03    136  |  108  |  19<H>  ----------------------------<  91  4.5   |  23  |  1.10    Ca    8.8      03 Jan 2024 07:55                  Radiology Results      Meds    MEDICATIONS  (STANDING):  atorvastatin 20 milliGRAM(s) Oral at bedtime  fluticasone propionate 50 MICROgram(s)/spray Nasal Spray 1 Spray(s) Both Nostrils two times a day  guaiFENesin ER 1200 milliGRAM(s) Oral every 12 hours  heparin  Infusion. 1000 Unit(s)/Hr (10 mL/Hr) IV Continuous <Continuous>  insulin lispro (ADMELOG) corrective regimen sliding scale   SubCutaneous Before meals and at bedtime  levoFLOXacin  Tablet 500 milliGRAM(s) Oral every 24 hours  metoprolol tartrate 12.5 milliGRAM(s) Oral two times a day  polyethylene glycol 3350 17 Gram(s) Oral daily  sacubitril 24 mG/valsartan 26 mG 1 Tablet(s) Oral two times a day  senna 2 Tablet(s) Oral at bedtime  warfarin 7 milliGRAM(s) Oral at bedtime      MEDICATIONS  (PRN):  acetaminophen     Tablet .. 650 milliGRAM(s) Oral every 6 hours PRN Temp greater or equal to 38C (100.4F), Mild Pain (1 - 3)  albuterol/ipratropium for Nebulization 3 milliLiter(s) Nebulizer every 6 hours PRN Shortness of Breath and/or Wheezing  ondansetron Injectable 4 milliGRAM(s) IV Push every 8 hours PRN Nausea and/or Vomiting  oxyCODONE    IR 5 milliGRAM(s) Oral every 6 hours PRN Moderate Pain (4 - 6)      Physical Exam    Neuro :  no focal deficits  Respiratory: CTA B/L  CV: RRR, S1S2, no murmurs,   Abdominal: Soft, NT, ND +BS,  Extremities: No edema, + peripheral pulses    ASSESSMENT    syncope pos 2nd to severe cough,   cns patho r/o,   troponinemia,   r/o acs,   right rib pain 2nd to fx,   s/p fall,   sinusitis,   cindy   h/o htn,   chf,   ppm,   dm,   PAF   aortic valve replacement (? mechanical)      PLAN    d/c tele,   statin,   neuro cons noted   Syncope in patient with dizziness and +jose orthostatics cw orthostatic syncope.  orthostatic bp q shift   trop x 4 positive noted above   cont coumadin to 7mg qhs  f/u inr   goal inr 2.5 to 3.5   cont hep drip as per hospital normogram  cardio f/u   cont Entresto   AICD interrogated with Episodes: Multiple episodes of NSVT and PMT at rate of 110bmp.  No events on the day of admission. noChanges made: Normal device function.  cont statin, lopressor.  echo with the left ventricular systolic function appears grossly normal.   There is mild (grade 1) left ventricular diastolic dysfunction noted above.    f/u stress test    pulm f/u    robitussin, albuterol,   flonase nasal spray,   levaquin, 500mg daily, to complete 7 days   serum potassium wnl   lispro ss,   hgba1c 6.2 noted    phys tx eval noted and rec No skilled PT needs  cont current meds

## 2024-01-04 NOTE — PROGRESS NOTE ADULT - SUBJECTIVE AND OBJECTIVE BOX
Date of Service 01-04-24 @ 09:47    CHIEF COMPLAINT:Patient is a 78y old  Male who presents with a chief complaint of syncope .Pt appears comfortable.    	  REVIEW OF SYSTEMS:  CONSTITUTIONAL: No fever, weight loss, or fatigue  EYES: No eye pain, visual disturbances, or discharge  ENT:  No difficulty hearing, tinnitus, vertigo; No sinus or throat pain  NECK: No pain or stiffness  RESPIRATORY: No cough, wheezing, chills or hemoptysis; No Shortness of Breath  CARDIOVASCULAR: No chest pain, palpitations, passing out, dizziness, or leg swelling  GASTROINTESTINAL: No abdominal or epigastric pain. No nausea, vomiting, or hematemesis; No diarrhea or constipation. No melena or hematochezia.  GENITOURINARY: No dysuria, frequency, hematuria, or incontinence  NEUROLOGICAL: No headaches, memory loss, loss of strength, numbness, or tremors  SKIN: No itching, burning, rashes, or lesions   LYMPH Nodes: No enlarged glands  ENDOCRINE: No heat or cold intolerance; No hair loss  MUSCULOSKELETAL: No joint pain or swelling; No muscle, back, or extremity pain  PSYCHIATRIC: No depression, anxiety, mood swings, or difficulty sleeping  HEME/LYMPH: No easy bruising, or bleeding gums  ALLERGY AND IMMUNOLOGIC: No hives or eczema	      PHYSICAL EXAM:  T(C): 36.9 (01-04-24 @ 08:06), Max: 37 (01-04-24 @ 00:00)  HR: 64 (01-04-24 @ 08:06) (61 - 65)  BP: 107/64 (01-04-24 @ 08:06) (100/51 - 119/59)  RR: 18 (01-04-24 @ 08:06) (18 - 18)  SpO2: 92% (01-04-24 @ 08:06) (92% - 96%)  Wt(kg): --  I&O's Summary      Appearance: Normal	  HEENT:   Normal oral mucosa, PERRL, EOMI	  Lymphatic: No lymphadenopathy  Cardiovascular: Normal S1 S2, +click  Respiratory: Lungs clear to auscultation	  Psychiatry: A & O x 3, Mood & affect appropriate  Gastrointestinal:  Soft, Non-tender, + BS	  Skin: No rashes, No ecchymoses, No cyanosis	  Neurologic: Non-focal  Extremities: Normal range of motion, No clubbing, cyanosis or edema  Vascular: Peripheral pulses palpable 2+ bilaterally    MEDICATIONS  (STANDING):  atorvastatin 20 milliGRAM(s) Oral at bedtime  fluticasone propionate 50 MICROgram(s)/spray Nasal Spray 1 Spray(s) Both Nostrils two times a day  guaiFENesin ER 1200 milliGRAM(s) Oral every 12 hours  heparin  Infusion. 1000 Unit(s)/Hr (10 mL/Hr) IV Continuous <Continuous>  insulin lispro (ADMELOG) corrective regimen sliding scale   SubCutaneous Before meals and at bedtime  levoFLOXacin  Tablet 500 milliGRAM(s) Oral every 24 hours  metoprolol tartrate 12.5 milliGRAM(s) Oral two times a day  polyethylene glycol 3350 17 Gram(s) Oral daily  sacubitril 24 mG/valsartan 26 mG 1 Tablet(s) Oral two times a day  senna 2 Tablet(s) Oral at bedtime  warfarin 7 milliGRAM(s) Oral at bedtime      TELEMETRY: 	  paced    LABS:	 	                       13.8   8.91  )-----------( 210      ( 04 Jan 2024 07:26 )             41.3     01-04    134<L>  |  103  |  23<H>  ----------------------------<  93  4.1   |  27  |  1.31<H>    Ca    9.1      04 Jan 2024 07:26  Mg     1.4     01-04      proBNP:   Lipid Profile: Cholesterol 132  LDL --  HDL 47    Ldl calc 59

## 2024-01-04 NOTE — PROGRESS NOTE ADULT - ASSESSMENT
Patient is a 79 y/o M with PMH of DM, HTN, HLD, PAF, CHF, PPM and defibrillator who presented after an episode of syncope. Patient also complains of severe cough for the past 4 days. Patient is being admitted for further management and work up of syncope.   Stress test no ischemia  CT chest acute right sided rib fractures, bibasilar atelectasis, possible mucus plugging. Patient no SOB or chest pain.   CT head resulting white matter ischemia   Patient is a 77 y/o M with PMH of DM, HTN, HLD, PAF, CHF, PPM and defibrillator who presented after an episode of syncope. Patient also complains of severe cough for the past 4 days. Patient is being admitted for further management and work up of syncope.   Stress test no ischemia  CT chest acute right sided rib fractures, bibasilar atelectasis, possible mucus plugging. Patient no SOB or chest pain.   CT head resulting white matter ischemia

## 2024-01-04 NOTE — PROGRESS NOTE ADULT - ASSESSMENT
77 y/o M with PMH of DM, HTN, HLD, PAF, CHF, S/P defibrillator,S/P CABG and AVR  who presented after an episode of syncope,acute lt sided rib fx,sinusitis,BEVERLY.  1.D/C Tele monitoring.  2.Interrogation of  AICD q3mo.  3.Echocardiogram as above..  4.PAF,mech AVR-coumadin and heparin drip until INR >2.5.  5.Borderline troponins-Stress test today.  6.CHF by history-b blocker.Entresto.  7.CAD-statin,lopressor.

## 2024-01-04 NOTE — PROGRESS NOTE ADULT - PROBLEM SELECTOR PLAN 2
P/w cough and bilateral wheeze.   RVP negative x 2  CT HEAD: Moderate mucosal thickening BILATERAL ethmoid sinuses and secretions in the RIGHT sphenoid sinus.  CT chest:  Findings suggestive of mucus plugging at the lung bases.   C/w: Flonase, Mucinex.   completed Levaquin

## 2024-01-04 NOTE — PROGRESS NOTE ADULT - SUBJECTIVE AND OBJECTIVE BOX
NP Note discussed with  Primary Attending    Patient is a 78y old  Male who presents with a chief complaint of Fall (04 Jan 2024 11:46)      INTERVAL HPI/OVERNIGHT EVENTS: no new complaints    MEDICATIONS  (STANDING):  atorvastatin 20 milliGRAM(s) Oral at bedtime  fluticasone propionate 50 MICROgram(s)/spray Nasal Spray 1 Spray(s) Both Nostrils two times a day  guaiFENesin ER 1200 milliGRAM(s) Oral every 12 hours  heparin  Infusion. 1000 Unit(s)/Hr (10 mL/Hr) IV Continuous <Continuous>  insulin lispro (ADMELOG) corrective regimen sliding scale   SubCutaneous Before meals and at bedtime  metoprolol tartrate 12.5 milliGRAM(s) Oral two times a day  polyethylene glycol 3350 17 Gram(s) Oral daily  sacubitril 24 mG/valsartan 26 mG 1 Tablet(s) Oral two times a day  senna 2 Tablet(s) Oral at bedtime  warfarin 7 milliGRAM(s) Oral at bedtime    MEDICATIONS  (PRN):  acetaminophen     Tablet .. 650 milliGRAM(s) Oral every 6 hours PRN Temp greater or equal to 38C (100.4F), Mild Pain (1 - 3)  albuterol/ipratropium for Nebulization 3 milliLiter(s) Nebulizer every 6 hours PRN Shortness of Breath and/or Wheezing  ondansetron Injectable 4 milliGRAM(s) IV Push every 8 hours PRN Nausea and/or Vomiting  oxyCODONE    IR 5 milliGRAM(s) Oral every 6 hours PRN Moderate Pain (4 - 6)      __________________________________________________  REVIEW OF SYSTEMS:    CONSTITUTIONAL: No fever,   EYES: no acute visual disturbances  NECK: No pain or stiffness  RESPIRATORY: No cough; No shortness of breath  CARDIOVASCULAR: No chest pain, no palpitations  GASTROINTESTINAL: No pain. No nausea or vomiting; No diarrhea   NEUROLOGICAL: No headache or numbness, no tremors  MUSCULOSKELETAL: No joint pain, no muscle pain  GENITOURINARY: no dysuria, no frequency, no hesitancy  PSYCHIATRY: no depression , no anxiety  ALL OTHER  ROS negative        Vital Signs Last 24 Hrs  T(C): 36.9 (04 Jan 2024 11:30), Max: 37 (04 Jan 2024 00:00)  T(F): 98.4 (04 Jan 2024 11:30), Max: 98.6 (04 Jan 2024 00:00)  HR: 86 (04 Jan 2024 11:30) (61 - 86)  BP: 116/66 (04 Jan 2024 11:30) (100/51 - 116/66)  BP(mean): 83 (04 Jan 2024 11:30) (76 - 83)  RR: 18 (04 Jan 2024 11:30) (18 - 18)  SpO2: 94% (04 Jan 2024 11:30) (92% - 96%)    Parameters below as of 04 Jan 2024 11:30  Patient On (Oxygen Delivery Method): room air        ________________________________________________  PHYSICAL EXAM:  GENERAL: NAD  HEENT: Normocephalic;  conjunctivae and sclerae clear; moist mucous membranes;   NECK : supple  CHEST/LUNG: Clear to auscultation bilaterally with good air entry   HEART: S1 S2  regular; no murmurs, gallops or rubs  ABDOMEN: Soft, Nontender, Nondistended; Bowel sounds present  EXTREMITIES: no cyanosis; no edema; no calf tenderness  SKIN: warm and dry; no rash  NERVOUS SYSTEM:  Awake and alert; Oriented  to place, person and time ; no new deficits    _________________________________________________  LABS:                        13.8   8.91  )-----------( 210      ( 04 Jan 2024 07:26 )             41.3     01-04    134<L>  |  103  |  23<H>  ----------------------------<  93  4.1   |  27  |  1.31<H>    Ca    9.1      04 Jan 2024 07:26  Mg     1.4     01-04      PT/INR - ( 04 Jan 2024 07:26 )   PT: 18.3 sec;   INR: 1.63 ratio         PTT - ( 04 Jan 2024 03:33 )  PTT:78.3 sec  Urinalysis Basic - ( 04 Jan 2024 07:26 )    Color: x / Appearance: x / SG: x / pH: x  Gluc: 93 mg/dL / Ketone: x  / Bili: x / Urobili: x   Blood: x / Protein: x / Nitrite: x   Leuk Esterase: x / RBC: x / WBC x   Sq Epi: x / Non Sq Epi: x / Bacteria: x      CAPILLARY BLOOD GLUCOSE      POCT Blood Glucose.: 94 mg/dL (04 Jan 2024 11:47)  POCT Blood Glucose.: 76 mg/dL (04 Jan 2024 07:32)  POCT Blood Glucose.: 104 mg/dL (03 Jan 2024 20:56)  POCT Blood Glucose.: 89 mg/dL (03 Jan 2024 16:55)        RADIOLOGY & ADDITIONAL TESTS:    Imaging  Reviewed:  YES/NO    Consultant(s) Notes Reviewed:   YES/ No      Plan of care was discussed with patient and /or primary care giver; all questions and concerns were addressed  NP Note discussed with  Primary Attending    Patient is a 78y old  Male who presents with a chief complaint of Fall (04 Jan 2024 11:46)      INTERVAL HPI/OVERNIGHT EVENTS: no new complaints    MEDICATIONS  (STANDING):  atorvastatin 20 milliGRAM(s) Oral at bedtime  fluticasone propionate 50 MICROgram(s)/spray Nasal Spray 1 Spray(s) Both Nostrils two times a day  guaiFENesin ER 1200 milliGRAM(s) Oral every 12 hours  heparin  Infusion. 1000 Unit(s)/Hr (10 mL/Hr) IV Continuous <Continuous>  insulin lispro (ADMELOG) corrective regimen sliding scale   SubCutaneous Before meals and at bedtime  metoprolol tartrate 12.5 milliGRAM(s) Oral two times a day  polyethylene glycol 3350 17 Gram(s) Oral daily  sacubitril 24 mG/valsartan 26 mG 1 Tablet(s) Oral two times a day  senna 2 Tablet(s) Oral at bedtime  warfarin 7 milliGRAM(s) Oral at bedtime    MEDICATIONS  (PRN):  acetaminophen     Tablet .. 650 milliGRAM(s) Oral every 6 hours PRN Temp greater or equal to 38C (100.4F), Mild Pain (1 - 3)  albuterol/ipratropium for Nebulization 3 milliLiter(s) Nebulizer every 6 hours PRN Shortness of Breath and/or Wheezing  ondansetron Injectable 4 milliGRAM(s) IV Push every 8 hours PRN Nausea and/or Vomiting  oxyCODONE    IR 5 milliGRAM(s) Oral every 6 hours PRN Moderate Pain (4 - 6)      __________________________________________________  REVIEW OF SYSTEMS:    CONSTITUTIONAL: No fever,   EYES: no acute visual disturbances  NECK: No pain or stiffness  RESPIRATORY: No cough; No shortness of breath  CARDIOVASCULAR: No chest pain, no palpitations  GASTROINTESTINAL: No pain. No nausea or vomiting; No diarrhea   NEUROLOGICAL: No headache or numbness, no tremors  MUSCULOSKELETAL: No joint pain, no muscle pain  GENITOURINARY: no dysuria, no frequency, no hesitancy  PSYCHIATRY: no depression , no anxiety  ALL OTHER  ROS negative        Vital Signs Last 24 Hrs  T(C): 36.9 (04 Jan 2024 11:30), Max: 37 (04 Jan 2024 00:00)  T(F): 98.4 (04 Jan 2024 11:30), Max: 98.6 (04 Jan 2024 00:00)  HR: 86 (04 Jan 2024 11:30) (61 - 86)  BP: 116/66 (04 Jan 2024 11:30) (100/51 - 116/66)  BP(mean): 83 (04 Jan 2024 11:30) (76 - 83)  RR: 18 (04 Jan 2024 11:30) (18 - 18)  SpO2: 94% (04 Jan 2024 11:30) (92% - 96%)    Parameters below as of 04 Jan 2024 11:30  Patient On (Oxygen Delivery Method): room air        ________________________________________________  PHYSICAL EXAM:  GENERAL: NAD  HEENT: Normocephalic;  conjunctivae and sclerae clear; moist mucous membranes;   NECK : supple  CHEST/LUNG: Clear to auscultation bilaterally with good air entry   HEART: S1 S2  regular; no murmurs, gallops or rubs  ABDOMEN: Soft, Nontender, Nondistended; Bowel sounds present  EXTREMITIES: no cyanosis; no edema; no calf tenderness  SKIN: warm and dry; no rash  NERVOUS SYSTEM:  Awake and alert; Oriented  to place, person and time ; no new deficits    _________________________________________________  LABS:                        13.8   8.91  )-----------( 210      ( 04 Jan 2024 07:26 )             41.3     01-04    134<L>  |  103  |  23<H>  ----------------------------<  93  4.1   |  27  |  1.31<H>    Ca    9.1      04 Jan 2024 07:26  Mg     1.4     01-04      PT/INR - ( 04 Jan 2024 07:26 )   PT: 18.3 sec;   INR: 1.63 ratio         PTT - ( 04 Jan 2024 03:33 )  PTT:78.3 sec  Urinalysis Basic - ( 04 Jan 2024 07:26 )    Color: x / Appearance: x / SG: x / pH: x  Gluc: 93 mg/dL / Ketone: x  / Bili: x / Urobili: x   Blood: x / Protein: x / Nitrite: x   Leuk Esterase: x / RBC: x / WBC x   Sq Epi: x / Non Sq Epi: x / Bacteria: x      CAPILLARY BLOOD GLUCOSE      POCT Blood Glucose.: 94 mg/dL (04 Jan 2024 11:47)  POCT Blood Glucose.: 76 mg/dL (04 Jan 2024 07:32)  POCT Blood Glucose.: 104 mg/dL (03 Jan 2024 20:56)  POCT Blood Glucose.: 89 mg/dL (03 Jan 2024 16:55)        RADIOLOGY & ADDITIONAL TESTS:  < from: CT Chest No Cont (12.30.23 @ 20:43) >    ACC: [id number] EXAM:  CT CHEST   ORDERED BY: LOY MENDEZ     PROCEDURE DATE:  12/30/2023          INTERPRETATION:  CLINICAL INDICATION: R posterior rib pain fall syncope   yesterday    PROCEDURE: CT of the chest was performed without intravenous contrast.   Coronal and sagittal reconstruction images were obtained.    CONTRAST/COMPLICATIONS:  IV Contrast: NONE  Oral Contrast: NONE  Complications: None reported at time of study completion    COMPARISON: None.    FINDINGS: Evaluation ofthe thoracic organs/vasculature is limited   without intravenous contrast. Artifact from patient's arms and   respiratory motion degrades images.    LUNGS AND AIRWAYS: Patent central airways. Bronchial thickening with   areas of narrowing at the lungbases, which may be due to mucus plugging.   Linear opacities at the left > right lung base, suggesting   atelectasis/scarring.  PLEURA: No pleural effusion or pneumothorax.  HEART: Normal size. No pericardial effusion. Mitral annular and coronary   desiccation. Aortic valve replacement.  VESSELS: Atherosclerosis. Ectatic distal ascending aorta/ascending aortic   arch (4.0 cm). CABG.  MEDIASTINUM AND YARELI: Subcentimeter lymph nodes.  CHEST WALL AND LOWER NECK: Left-sided AICD/pacemaker. Median sternotomy.  UPPER ABDOMEN: Fatty atrophy of the visualized pancreas. Bilateral renal   cysts. Bilateral perinephric stranding. Nonobstructing 0.2 cm right renal   stone. Colon diverticulosis. Partially visualized visualized duodenal   diverticulum.  BONES: Acute fractures of the right sixth through ninth anterior ribs   (nondisplaced right sixth and seventh and minimally displaced right   eighth and ninth ribs). Corticated, slight deformities of additional   bilateral ribs, suggesting chronic. Degenerative changes of the spine.    IMPRESSION:    Evaluation of the thoracic organs/vasculature is limited without   intravenous contrast. Acute right-sided rib fractures. No obvious   pneumothorax.    Bibasilar atelectasis/scarring. Findings suggestive of mucus plugging at   the lung bases.    Additional findings as described.    --- End of Report ---      < end of copied text >  < from: CT Cervical Spine No Cont (12.30.23 @ 20:43) >  ACC: [id number] EXAM:  CT CERVICAL SPINE   ORDERED BY: LOY MENDEZ     ACC: [id number] EXAM:  CT BRAIN   ORDERED BY: LOY MENDEZ     PROCEDURE DATE:  12/30/2023          INTERPRETATION:  CT head without IV contrast    CLINICALINFORMATION:  Fall   Intracranial hemorrhage.    TECHNIQUE: Contiguous axial 5 mm sections were obtained through the head.   Sagittal and coronal 2-D reformatted images were also obtained.   This   scan was performed using automatic exposure control (radiation dose   reduction software) to obtain a diagnostic image quality scan with   patient dose as low as reasonably achievable.    FINDINGS:   No previous examinations are available for review.    The brain demonstrates moderate anterior periventricular white matter   ischemia.   No acute cerebral cortical infarct is seen.  No intracranial   hemorrhage is found.  No mass effect is found in the brain.    The ventricles, sulci and basal cisterns show mild global atrophy.    The orbits are unremarkable.  The paranasal sinuses are significant for   moderate mucosal thickening in the BILATERAL ethmoid sinuses and   secretions in the RIGHT sphenoid sinus.  The nasal cavity appears intact.    The nasopharynx is symmetric.  The central skull base, petrous temporal   bones and calvarium remain intact.        CT cervical spine without IV contrast    CLINICAL INFORMATION: Fracture, trauma, neck pain.  Neck pain, spinal   stenosis, spondylosis. fall syncope    TECHNIQUE:  Contiguous axial 2.0 mm sections were obtained through the   cervical spine using a single helical acquisition.  Additional 2 mm   sagittal and coronal reformatted reconstructions of the spine were   obtained.  These additional reformatted images were used to evaluate the   spine for alignment, vertebral fractures and the integrity of the the   posterior elements.   This scan was performed using automatic exposure   control (radiation dose reduction software) to obtain a diagnostic image   quality scan with patient dose as low as reasonably achievable.    FINDINGS:   No prior similar studies are available for review    Cervical vertebral body heights are maintained. No vertebral fracture is   seen. No destructive bone lesion is found.  Alignment is significant for   straightening on a degenerative basis.  Facet joints appear intact and   aligned.    Cervical intervertebral disc spaces show mild to moderate degenerative   disc disease and spondylosis at C3-4 C6-7 with loss of disc height and   associated degenerative endplate changes. There is narrowing of the RIGHT   C6/7 neural foramen due to uncovertebral spurring. Mild posterior   osteophytic ridge/disc complexes at C4-5 through C6-7 flatten the ventral   thecal sac.    The skull base appears intact.  No neck mass is recognized.  Paraspinal   soft tissues appear intact. Visualized lymph nodes appear to be within   physiologic size limits.  Prominence of the aortic arch. See chest CT for   details.          IMPRESSION:    CT HEAD: Moderate anterior periventricular white matter ischemia.    Mild   global atrophy.  Moderate mucosal thickening in the BILATERAL ethmoid   sinuses and secretions in the RIGHT sphenoid sinus.    CT cervical spine:   No vertebral fracture is recognized.  Mild to   moderate degenerative disc disease and spondylosis at C3-4 C6-7 with loss   of disc height and associated degenerative endplate changes. There is   narrowing of the RIGHT C6/7 neural foramen due to uncovertebral spurring.   Mild posterior osteophytic ridge/disccomplexes at C4-5 through C6-7   flatten the ventral thecal sac.Prominence of the aortic arch. See chest   CT for details.    --- End of Report ---    < end of copied text >  < from: TTE W or WO Ultrasound Enhancing Agent (01.01.24 @ 11:26) >  CONCLUSIONS:      1. The left atrium is mildly dilated   2. 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 Acceleration time of 93 msec and an effictive orifice area indexed to BSA of 0.7 cm2/m2. These findings suggest a high flow state.   3. Left ventricular endocardium is not well visualized; however, the left ventricular systolic function appears grossly normal.   4. There is increased LV mass and concentric hypertrophy.   5. There is mild (grade 1) left ventricular diastolicdysfunction.   6. Normal right ventricular cavity size, wall thickness, and systolic function.   7. Device lead is visualized.    ________________________________________________________________________________________  FINDINGS:     Left Ventricle:  There is mild (grade 1) left ventricular diastolic dysfunction. There is increased LV mass and concentric hypertrophy. Left ventricular endocardium is not well visualized; however, the left ventricular systolic function appears grossly normal.     Right Ventricle:  The right ventricular cavity is normal in size, normal wall thickness and normal systolic function. A device lead is visualized.     Left Atrium:  The left atrium is mildly dilated with an indexed volume of 36.20 ml/m².     Right Atrium:  The right atrium is normal in size.     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 Acceleration time of 93 msec and an effictive orifice area indexed to BSA of 0.7 cm2/m2. These findings suggest a high flow state. The peak transaortic velocity is 3.00 m/s, peak transaortic gradient is 36.1 mmHg and mean transaortic gradient is 19.6 mmHg with an LVOT/aortic valve VTI ratio of 0.39. The aortic valve area is estimated at 1.24 cm² by the continuity equation.     Mitral Valve:  There is mild calcification of the mitral valve annulus. There is trace mitral regurgitation.     Tricuspid Valve:  There is mild tricuspid regurgitation. Estimated pulmonary artery systolic pressure is 28 mmHg, consistent with normal pulmonary artery pressure.     Pulmonic Valve:  There is trace pulmonic regurgitation.     Aorta:  The aortic root appears normal in size.     Pericardium:  No pericardial effusion seen.     Systemic Veins:  The inferior vena cavais normal in size (normal <2.1cm) with normal inspiratory collapse (normal >50%) consistent with normal right atrial pressure (~3, range 0-5mmHg).  ____________________________________________________________________  QUANTITATIVE DATA:  Left Ventricle Measurements: (Indexed to BSA)     IVSd (2D):   1.6 cm  LVPWd (2D):  1.3 cm  LVIDd (2D):  4.1 cm  LVIDs (2D):  3.1 cm  LV Mass:     225 g  123.2 g/m²     MV E Vmax: 0.85 m/s  MV A Vmax: 1.16 m/s  MV E/A:    0.73  MV DT:     291 msec    Aorta Measurements: (normal range) (Indexed to BSA)     Ao Root 3.6 cm       Left Atrium Measurements: (Indexed to BSA)  LA Diam 2D: 4.74 cm       LVOT / RVOT/ Qp/Qs Data: (Indexed to BSA)  LVOT Diameter: 2.00 cm  LVOT Vmax:     1.23 m/s  LVOT VTI:      25.68 cm  LVOT SV:       80.7 ml  44.13 ml/m²    Aortic Valve Measurements:  AV Vmax:                3.0 m/s  AV Peak Gradient:       36.1 mmHg  AV Mean Gradient:       19.6 mmHg  AV VTI:                 65.2 cm  AV VTI Ratio:           0.39  AoV EOA, Contin:      1.24 cm²  AoV EOA, Contin i:      0.68 cm²/m²  AoV Dimensionless Index 0.39    Mitral Valve Measurements:     MV E Vmax: 0.8 m/s  MV A Vmax: 1.2 m/s  MV E/A:    0.7       Tricuspid Valve Measurements:     TR Vmax:          2.5 m/s  TR Peak Gradient: 24.5 mmHg  RA Pressure:      3 mmHg  PASP:             28 mmHg    ________________________________________________________________________________________  Electronically signed on 1/2/2024 at 12:38:35 PM by Ricardo Goetz MD         *** Final ***    < end of copied text >    Imaging  Reviewed:  YES    Consultant(s) Notes Reviewed:   YES      Plan of care was discussed with patient and /or primary care giver; all questions and concerns were addressed  NP Note discussed with  Primary Attending    Patient is a 78y old  Male who presents with a chief complaint of Fall (04 Jan 2024 11:46)      INTERVAL HPI/OVERNIGHT EVENTS: no new complaints    MEDICATIONS  (STANDING):  atorvastatin 20 milliGRAM(s) Oral at bedtime  fluticasone propionate 50 MICROgram(s)/spray Nasal Spray 1 Spray(s) Both Nostrils two times a day  guaiFENesin ER 1200 milliGRAM(s) Oral every 12 hours  heparin  Infusion. 1000 Unit(s)/Hr (10 mL/Hr) IV Continuous <Continuous>  insulin lispro (ADMELOG) corrective regimen sliding scale   SubCutaneous Before meals and at bedtime  metoprolol tartrate 12.5 milliGRAM(s) Oral two times a day  polyethylene glycol 3350 17 Gram(s) Oral daily  sacubitril 24 mG/valsartan 26 mG 1 Tablet(s) Oral two times a day  senna 2 Tablet(s) Oral at bedtime  warfarin 7 milliGRAM(s) Oral at bedtime    MEDICATIONS  (PRN):  acetaminophen     Tablet .. 650 milliGRAM(s) Oral every 6 hours PRN Temp greater or equal to 38C (100.4F), Mild Pain (1 - 3)  albuterol/ipratropium for Nebulization 3 milliLiter(s) Nebulizer every 6 hours PRN Shortness of Breath and/or Wheezing  ondansetron Injectable 4 milliGRAM(s) IV Push every 8 hours PRN Nausea and/or Vomiting  oxyCODONE    IR 5 milliGRAM(s) Oral every 6 hours PRN Moderate Pain (4 - 6)      __________________________________________________  REVIEW OF SYSTEMS:    CONSTITUTIONAL: No fever,   EYES: no acute visual disturbances  NECK: No pain or stiffness  RESPIRATORY: No cough; No shortness of breath  CARDIOVASCULAR: No chest pain, no palpitations  GASTROINTESTINAL: No pain. No nausea or vomiting; No diarrhea   NEUROLOGICAL: No headache or numbness, no tremors  MUSCULOSKELETAL: No joint pain, no muscle pain  GENITOURINARY: no dysuria, no frequency, no hesitancy  PSYCHIATRY: no depression , no anxiety  ALL OTHER  ROS negative        Vital Signs Last 24 Hrs  T(C): 36.9 (04 Jan 2024 11:30), Max: 37 (04 Jan 2024 00:00)  T(F): 98.4 (04 Jan 2024 11:30), Max: 98.6 (04 Jan 2024 00:00)  HR: 86 (04 Jan 2024 11:30) (61 - 86)  BP: 116/66 (04 Jan 2024 11:30) (100/51 - 116/66)  BP(mean): 83 (04 Jan 2024 11:30) (76 - 83)  RR: 18 (04 Jan 2024 11:30) (18 - 18)  SpO2: 94% (04 Jan 2024 11:30) (92% - 96%)    Parameters below as of 04 Jan 2024 11:30  Patient On (Oxygen Delivery Method): room air        ________________________________________________  PHYSICAL EXAM:  GENERAL: NAD  HEENT: Normocephalic;  conjunctivae and sclerae clear; moist mucous membranes;   NECK : supple  CHEST/LUNG: Clear to auscultation bilaterally with good air entry   HEART: S1 S2  regular; no murmurs, gallops or rubs  ABDOMEN: Soft, Nontender, Nondistended; Bowel sounds present  EXTREMITIES: no cyanosis; no edema; no calf tenderness  SKIN: warm and dry; no rash  NERVOUS SYSTEM:  Awake and alert; Oriented  to place, person and time ; no new deficits    _________________________________________________  LABS:                        13.8   8.91  )-----------( 210      ( 04 Jan 2024 07:26 )             41.3     01-04    134<L>  |  103  |  23<H>  ----------------------------<  93  4.1   |  27  |  1.31<H>    Ca    9.1      04 Jan 2024 07:26  Mg     1.4     01-04      PT/INR - ( 04 Jan 2024 07:26 )   PT: 18.3 sec;   INR: 1.63 ratio         PTT - ( 04 Jan 2024 03:33 )  PTT:78.3 sec  Urinalysis Basic - ( 04 Jan 2024 07:26 )    Color: x / Appearance: x / SG: x / pH: x  Gluc: 93 mg/dL / Ketone: x  / Bili: x / Urobili: x   Blood: x / Protein: x / Nitrite: x   Leuk Esterase: x / RBC: x / WBC x   Sq Epi: x / Non Sq Epi: x / Bacteria: x      CAPILLARY BLOOD GLUCOSE      POCT Blood Glucose.: 94 mg/dL (04 Jan 2024 11:47)  POCT Blood Glucose.: 76 mg/dL (04 Jan 2024 07:32)  POCT Blood Glucose.: 104 mg/dL (03 Jan 2024 20:56)  POCT Blood Glucose.: 89 mg/dL (03 Jan 2024 16:55)        RADIOLOGY & ADDITIONAL TESTS:  < from: CT Chest No Cont (12.30.23 @ 20:43) >    ACC: [id number] EXAM:  CT CHEST   ORDERED BY: LOY MENDEZ     PROCEDURE DATE:  12/30/2023          INTERPRETATION:  CLINICAL INDICATION: R posterior rib pain fall syncope   yesterday    PROCEDURE: CT of the chest was performed without intravenous contrast.   Coronal and sagittal reconstruction images were obtained.    CONTRAST/COMPLICATIONS:  IV Contrast: NONE  Oral Contrast: NONE  Complications: None reported at time of study completion    COMPARISON: None.    FINDINGS: Evaluation ofthe thoracic organs/vasculature is limited   without intravenous contrast. Artifact from patient's arms and   respiratory motion degrades images.    LUNGS AND AIRWAYS: Patent central airways. Bronchial thickening with   areas of narrowing at the lungbases, which may be due to mucus plugging.   Linear opacities at the left > right lung base, suggesting   atelectasis/scarring.  PLEURA: No pleural effusion or pneumothorax.  HEART: Normal size. No pericardial effusion. Mitral annular and coronary   desiccation. Aortic valve replacement.  VESSELS: Atherosclerosis. Ectatic distal ascending aorta/ascending aortic   arch (4.0 cm). CABG.  MEDIASTINUM AND YARELI: Subcentimeter lymph nodes.  CHEST WALL AND LOWER NECK: Left-sided AICD/pacemaker. Median sternotomy.  UPPER ABDOMEN: Fatty atrophy of the visualized pancreas. Bilateral renal   cysts. Bilateral perinephric stranding. Nonobstructing 0.2 cm right renal   stone. Colon diverticulosis. Partially visualized visualized duodenal   diverticulum.  BONES: Acute fractures of the right sixth through ninth anterior ribs   (nondisplaced right sixth and seventh and minimally displaced right   eighth and ninth ribs). Corticated, slight deformities of additional   bilateral ribs, suggesting chronic. Degenerative changes of the spine.    IMPRESSION:    Evaluation of the thoracic organs/vasculature is limited without   intravenous contrast. Acute right-sided rib fractures. No obvious   pneumothorax.    Bibasilar atelectasis/scarring. Findings suggestive of mucus plugging at   the lung bases.    Additional findings as described.    --- End of Report ---      < end of copied text >  < from: CT Cervical Spine No Cont (12.30.23 @ 20:43) >  ACC: [id number] EXAM:  CT CERVICAL SPINE   ORDERED BY: LOY MENDEZ     ACC: [id number] EXAM:  CT BRAIN   ORDERED BY: LOY MENDEZ     PROCEDURE DATE:  12/30/2023          INTERPRETATION:  CT head without IV contrast    CLINICALINFORMATION:  Fall   Intracranial hemorrhage.    TECHNIQUE: Contiguous axial 5 mm sections were obtained through the head.   Sagittal and coronal 2-D reformatted images were also obtained.   This   scan was performed using automatic exposure control (radiation dose   reduction software) to obtain a diagnostic image quality scan with   patient dose as low as reasonably achievable.    FINDINGS:   No previous examinations are available for review.    The brain demonstrates moderate anterior periventricular white matter   ischemia.   No acute cerebral cortical infarct is seen.  No intracranial   hemorrhage is found.  No mass effect is found in the brain.    The ventricles, sulci and basal cisterns show mild global atrophy.    The orbits are unremarkable.  The paranasal sinuses are significant for   moderate mucosal thickening in the BILATERAL ethmoid sinuses and   secretions in the RIGHT sphenoid sinus.  The nasal cavity appears intact.    The nasopharynx is symmetric.  The central skull base, petrous temporal   bones and calvarium remain intact.        CT cervical spine without IV contrast    CLINICAL INFORMATION: Fracture, trauma, neck pain.  Neck pain, spinal   stenosis, spondylosis. fall syncope    TECHNIQUE:  Contiguous axial 2.0 mm sections were obtained through the   cervical spine using a single helical acquisition.  Additional 2 mm   sagittal and coronal reformatted reconstructions of the spine were   obtained.  These additional reformatted images were used to evaluate the   spine for alignment, vertebral fractures and the integrity of the the   posterior elements.   This scan was performed using automatic exposure   control (radiation dose reduction software) to obtain a diagnostic image   quality scan with patient dose as low as reasonably achievable.    FINDINGS:   No prior similar studies are available for review    Cervical vertebral body heights are maintained. No vertebral fracture is   seen. No destructive bone lesion is found.  Alignment is significant for   straightening on a degenerative basis.  Facet joints appear intact and   aligned.    Cervical intervertebral disc spaces show mild to moderate degenerative   disc disease and spondylosis at C3-4 C6-7 with loss of disc height and   associated degenerative endplate changes. There is narrowing of the RIGHT   C6/7 neural foramen due to uncovertebral spurring. Mild posterior   osteophytic ridge/disc complexes at C4-5 through C6-7 flatten the ventral   thecal sac.    The skull base appears intact.  No neck mass is recognized.  Paraspinal   soft tissues appear intact. Visualized lymph nodes appear to be within   physiologic size limits.  Prominence of the aortic arch. See chest CT for   details.          IMPRESSION:    CT HEAD: Moderate anterior periventricular white matter ischemia.    Mild   global atrophy.  Moderate mucosal thickening in the BILATERAL ethmoid   sinuses and secretions in the RIGHT sphenoid sinus.    CT cervical spine:   No vertebral fracture is recognized.  Mild to   moderate degenerative disc disease and spondylosis at C3-4 C6-7 with loss   of disc height and associated degenerative endplate changes. There is   narrowing of the RIGHT C6/7 neural foramen due to uncovertebral spurring.   Mild posterior osteophytic ridge/disccomplexes at C4-5 through C6-7   flatten the ventral thecal sac.Prominence of the aortic arch. See chest   CT for details.    --- End of Report ---    < end of copied text >  < from: TTE W or WO Ultrasound Enhancing Agent (01.01.24 @ 11:26) >  CONCLUSIONS:      1. The left atrium is mildly dilated   2. 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 Acceleration time of 93 msec and an effictive orifice area indexed to BSA of 0.7 cm2/m2. These findings suggest a high flow state.   3. Left ventricular endocardium is not well visualized; however, the left ventricular systolic function appears grossly normal.   4. There is increased LV mass and concentric hypertrophy.   5. There is mild (grade 1) left ventricular diastolicdysfunction.   6. Normal right ventricular cavity size, wall thickness, and systolic function.   7. Device lead is visualized.    ________________________________________________________________________________________  FINDINGS:     Left Ventricle:  There is mild (grade 1) left ventricular diastolic dysfunction. There is increased LV mass and concentric hypertrophy. Left ventricular endocardium is not well visualized; however, the left ventricular systolic function appears grossly normal.     Right Ventricle:  The right ventricular cavity is normal in size, normal wall thickness and normal systolic function. A device lead is visualized.     Left Atrium:  The left atrium is mildly dilated with an indexed volume of 36.20 ml/m².     Right Atrium:  The right atrium is normal in size.     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 Acceleration time of 93 msec and an effictive orifice area indexed to BSA of 0.7 cm2/m2. These findings suggest a high flow state. The peak transaortic velocity is 3.00 m/s, peak transaortic gradient is 36.1 mmHg and mean transaortic gradient is 19.6 mmHg with an LVOT/aortic valve VTI ratio of 0.39. The aortic valve area is estimated at 1.24 cm² by the continuity equation.     Mitral Valve:  There is mild calcification of the mitral valve annulus. There is trace mitral regurgitation.     Tricuspid Valve:  There is mild tricuspid regurgitation. Estimated pulmonary artery systolic pressure is 28 mmHg, consistent with normal pulmonary artery pressure.     Pulmonic Valve:  There is trace pulmonic regurgitation.     Aorta:  The aortic root appears normal in size.     Pericardium:  No pericardial effusion seen.     Systemic Veins:  The inferior vena cavais normal in size (normal <2.1cm) with normal inspiratory collapse (normal >50%) consistent with normal right atrial pressure (~3, range 0-5mmHg).  ____________________________________________________________________  QUANTITATIVE DATA:  Left Ventricle Measurements: (Indexed to BSA)     IVSd (2D):   1.6 cm  LVPWd (2D):  1.3 cm  LVIDd (2D):  4.1 cm  LVIDs (2D):  3.1 cm  LV Mass:     225 g  123.2 g/m²     MV E Vmax: 0.85 m/s  MV A Vmax: 1.16 m/s  MV E/A:    0.73  MV DT:     291 msec    Aorta Measurements: (normal range) (Indexed to BSA)     Ao Root 3.6 cm       Left Atrium Measurements: (Indexed to BSA)  LA Diam 2D: 4.74 cm       LVOT / RVOT/ Qp/Qs Data: (Indexed to BSA)  LVOT Diameter: 2.00 cm  LVOT Vmax:     1.23 m/s  LVOT VTI:      25.68 cm  LVOT SV:       80.7 ml  44.13 ml/m²    Aortic Valve Measurements:  AV Vmax:                3.0 m/s  AV Peak Gradient:       36.1 mmHg  AV Mean Gradient:       19.6 mmHg  AV VTI:                 65.2 cm  AV VTI Ratio:           0.39  AoV EOA, Contin:      1.24 cm²  AoV EOA, Contin i:      0.68 cm²/m²  AoV Dimensionless Index 0.39    Mitral Valve Measurements:     MV E Vmax: 0.8 m/s  MV A Vmax: 1.2 m/s  MV E/A:    0.7       Tricuspid Valve Measurements:     TR Vmax:          2.5 m/s  TR Peak Gradient: 24.5 mmHg  RA Pressure:      3 mmHg  PASP:             28 mmHg    ________________________________________________________________________________________  Electronically signed on 1/2/2024 at 12:38:35 PM by Ricardo Goetz MD         *** Final ***    < end of copied text >    Imaging  Reviewed:  YES    Consultant(s) Notes Reviewed:   YES      Plan of care was discussed with patient and /or primary care giver; all questions and concerns were addressed

## 2024-01-04 NOTE — PROGRESS NOTE ADULT - PROBLEM SELECTOR PLAN 5
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  INR 1.6

## 2024-01-04 NOTE — PROGRESS NOTE ADULT - PROBLEM SELECTOR PLAN 6
resumed entresto 1/1/24  c/w metoprolol  echo noted.  not in exacerbation  cardio Dr. Toure Private car

## 2024-01-05 DIAGNOSIS — Z02.9 ENCOUNTER FOR ADMINISTRATIVE EXAMINATIONS, UNSPECIFIED: ICD-10-CM

## 2024-01-05 LAB
ANION GAP SERPL CALC-SCNC: 4 MMOL/L — LOW (ref 5–17)
ANION GAP SERPL CALC-SCNC: 4 MMOL/L — LOW (ref 5–17)
APTT BLD: 73.6 SEC — HIGH (ref 24.5–35.6)
APTT BLD: 73.6 SEC — HIGH (ref 24.5–35.6)
BUN SERPL-MCNC: 20 MG/DL — HIGH (ref 7–18)
BUN SERPL-MCNC: 20 MG/DL — HIGH (ref 7–18)
CALCIUM SERPL-MCNC: 9.2 MG/DL — SIGNIFICANT CHANGE UP (ref 8.4–10.5)
CALCIUM SERPL-MCNC: 9.2 MG/DL — SIGNIFICANT CHANGE UP (ref 8.4–10.5)
CHLORIDE SERPL-SCNC: 103 MMOL/L — SIGNIFICANT CHANGE UP (ref 96–108)
CHLORIDE SERPL-SCNC: 103 MMOL/L — SIGNIFICANT CHANGE UP (ref 96–108)
CO2 SERPL-SCNC: 28 MMOL/L — SIGNIFICANT CHANGE UP (ref 22–31)
CO2 SERPL-SCNC: 28 MMOL/L — SIGNIFICANT CHANGE UP (ref 22–31)
CREAT SERPL-MCNC: 1.26 MG/DL — SIGNIFICANT CHANGE UP (ref 0.5–1.3)
CREAT SERPL-MCNC: 1.26 MG/DL — SIGNIFICANT CHANGE UP (ref 0.5–1.3)
EGFR: 59 ML/MIN/1.73M2 — LOW
EGFR: 59 ML/MIN/1.73M2 — LOW
GLUCOSE BLDC GLUCOMTR-MCNC: 109 MG/DL — HIGH (ref 70–99)
GLUCOSE BLDC GLUCOMTR-MCNC: 109 MG/DL — HIGH (ref 70–99)
GLUCOSE BLDC GLUCOMTR-MCNC: 141 MG/DL — HIGH (ref 70–99)
GLUCOSE BLDC GLUCOMTR-MCNC: 141 MG/DL — HIGH (ref 70–99)
GLUCOSE BLDC GLUCOMTR-MCNC: 85 MG/DL — SIGNIFICANT CHANGE UP (ref 70–99)
GLUCOSE BLDC GLUCOMTR-MCNC: 85 MG/DL — SIGNIFICANT CHANGE UP (ref 70–99)
GLUCOSE BLDC GLUCOMTR-MCNC: 86 MG/DL — SIGNIFICANT CHANGE UP (ref 70–99)
GLUCOSE BLDC GLUCOMTR-MCNC: 86 MG/DL — SIGNIFICANT CHANGE UP (ref 70–99)
GLUCOSE SERPL-MCNC: 97 MG/DL — SIGNIFICANT CHANGE UP (ref 70–99)
GLUCOSE SERPL-MCNC: 97 MG/DL — SIGNIFICANT CHANGE UP (ref 70–99)
HCT VFR BLD CALC: 42 % — SIGNIFICANT CHANGE UP (ref 39–50)
HCT VFR BLD CALC: 42 % — SIGNIFICANT CHANGE UP (ref 39–50)
HGB BLD-MCNC: 14 G/DL — SIGNIFICANT CHANGE UP (ref 13–17)
HGB BLD-MCNC: 14 G/DL — SIGNIFICANT CHANGE UP (ref 13–17)
INR BLD: 1.65 RATIO — HIGH (ref 0.85–1.18)
INR BLD: 1.65 RATIO — HIGH (ref 0.85–1.18)
MCHC RBC-ENTMCNC: 31.3 PG — SIGNIFICANT CHANGE UP (ref 27–34)
MCHC RBC-ENTMCNC: 31.3 PG — SIGNIFICANT CHANGE UP (ref 27–34)
MCHC RBC-ENTMCNC: 33.3 GM/DL — SIGNIFICANT CHANGE UP (ref 32–36)
MCHC RBC-ENTMCNC: 33.3 GM/DL — SIGNIFICANT CHANGE UP (ref 32–36)
MCV RBC AUTO: 93.8 FL — SIGNIFICANT CHANGE UP (ref 80–100)
MCV RBC AUTO: 93.8 FL — SIGNIFICANT CHANGE UP (ref 80–100)
NRBC # BLD: 0 /100 WBCS — SIGNIFICANT CHANGE UP (ref 0–0)
NRBC # BLD: 0 /100 WBCS — SIGNIFICANT CHANGE UP (ref 0–0)
PLATELET # BLD AUTO: 246 K/UL — SIGNIFICANT CHANGE UP (ref 150–400)
PLATELET # BLD AUTO: 246 K/UL — SIGNIFICANT CHANGE UP (ref 150–400)
POTASSIUM SERPL-MCNC: 3.6 MMOL/L — SIGNIFICANT CHANGE UP (ref 3.5–5.3)
POTASSIUM SERPL-MCNC: 3.6 MMOL/L — SIGNIFICANT CHANGE UP (ref 3.5–5.3)
POTASSIUM SERPL-SCNC: 3.6 MMOL/L — SIGNIFICANT CHANGE UP (ref 3.5–5.3)
POTASSIUM SERPL-SCNC: 3.6 MMOL/L — SIGNIFICANT CHANGE UP (ref 3.5–5.3)
PROTHROM AB SERPL-ACNC: 18.5 SEC — HIGH (ref 9.5–13)
PROTHROM AB SERPL-ACNC: 18.5 SEC — HIGH (ref 9.5–13)
RBC # BLD: 4.48 M/UL — SIGNIFICANT CHANGE UP (ref 4.2–5.8)
RBC # BLD: 4.48 M/UL — SIGNIFICANT CHANGE UP (ref 4.2–5.8)
RBC # FLD: 13.7 % — SIGNIFICANT CHANGE UP (ref 10.3–14.5)
RBC # FLD: 13.7 % — SIGNIFICANT CHANGE UP (ref 10.3–14.5)
SODIUM SERPL-SCNC: 135 MMOL/L — SIGNIFICANT CHANGE UP (ref 135–145)
SODIUM SERPL-SCNC: 135 MMOL/L — SIGNIFICANT CHANGE UP (ref 135–145)
WBC # BLD: 9.36 K/UL — SIGNIFICANT CHANGE UP (ref 3.8–10.5)
WBC # BLD: 9.36 K/UL — SIGNIFICANT CHANGE UP (ref 3.8–10.5)
WBC # FLD AUTO: 9.36 K/UL — SIGNIFICANT CHANGE UP (ref 3.8–10.5)
WBC # FLD AUTO: 9.36 K/UL — SIGNIFICANT CHANGE UP (ref 3.8–10.5)

## 2024-01-05 RX ORDER — WARFARIN SODIUM 2.5 MG/1
8 TABLET ORAL ONCE
Refills: 0 | Status: COMPLETED | OUTPATIENT
Start: 2024-01-05 | End: 2024-01-05

## 2024-01-05 RX ADMIN — PANTOPRAZOLE SODIUM 40 MILLIGRAM(S): 20 TABLET, DELAYED RELEASE ORAL at 06:36

## 2024-01-05 RX ADMIN — SACUBITRIL AND VALSARTAN 1 TABLET(S): 24; 26 TABLET, FILM COATED ORAL at 17:18

## 2024-01-05 RX ADMIN — Medication 1 SPRAY(S): at 17:21

## 2024-01-05 RX ADMIN — HEPARIN SODIUM 800 UNIT(S)/HR: 5000 INJECTION INTRAVENOUS; SUBCUTANEOUS at 07:06

## 2024-01-05 RX ADMIN — HEPARIN SODIUM 800 UNIT(S)/HR: 5000 INJECTION INTRAVENOUS; SUBCUTANEOUS at 19:15

## 2024-01-05 RX ADMIN — Medication 12.5 MILLIGRAM(S): at 05:34

## 2024-01-05 RX ADMIN — HEPARIN SODIUM 800 UNIT(S)/HR: 5000 INJECTION INTRAVENOUS; SUBCUTANEOUS at 06:15

## 2024-01-05 RX ADMIN — Medication 1 SPRAY(S): at 05:45

## 2024-01-05 RX ADMIN — SACUBITRIL AND VALSARTAN 1 TABLET(S): 24; 26 TABLET, FILM COATED ORAL at 05:43

## 2024-01-05 RX ADMIN — SENNA PLUS 2 TABLET(S): 8.6 TABLET ORAL at 21:20

## 2024-01-05 RX ADMIN — ATORVASTATIN CALCIUM 20 MILLIGRAM(S): 80 TABLET, FILM COATED ORAL at 21:21

## 2024-01-05 RX ADMIN — WARFARIN SODIUM 8 MILLIGRAM(S): 2.5 TABLET ORAL at 21:17

## 2024-01-05 RX ADMIN — POLYETHYLENE GLYCOL 3350 17 GRAM(S): 17 POWDER, FOR SOLUTION ORAL at 11:29

## 2024-01-05 RX ADMIN — Medication 1200 MILLIGRAM(S): at 17:19

## 2024-01-05 RX ADMIN — HEPARIN SODIUM 800 UNIT(S)/HR: 5000 INJECTION INTRAVENOUS; SUBCUTANEOUS at 17:22

## 2024-01-05 RX ADMIN — Medication 1200 MILLIGRAM(S): at 05:43

## 2024-01-05 RX ADMIN — Medication 12.5 MILLIGRAM(S): at 17:18

## 2024-01-05 NOTE — PROGRESS NOTE ADULT - PROBLEM SELECTOR PLAN 2
P/w cough and bilateral wheeze.   RVP negative x 2  CT HEAD: Moderate mucosal thickening BILATERAL ethmoid sinuses and secretions in the RIGHT sphenoid sinus.  CT chest:  Findings suggestive of mucus plugging at the lung bases.   C/w: Flonase, Mucinex.   completed Levaquin.

## 2024-01-05 NOTE — CHART NOTE - NSCHARTNOTEFT_GEN_A_CORE
Pharmacist recs switch to lovenox in setting of improving kidney function.   D/w primary attending and Card Dr. Toure who recommends to continue with Heparin drip in setting of mechanical valve.     Lashawn Palomino NP Medicine

## 2024-01-05 NOTE — PROGRESS NOTE ADULT - PROBLEM SELECTOR PLAN 1
Tele monitoring  Cardio follow up  neuro follow up  AICD interrogation  stress test negative Tele monitoring  Cardio follow up  neuro follow up  AICD interrogation Q3 months  stress test negative

## 2024-01-05 NOTE — PROGRESS NOTE ADULT - SUBJECTIVE AND OBJECTIVE BOX
Patient is a 78y old  Male who presents with a chief complaint of Syncope (05 Jan 2024 09:57)  Awake, alert, comfortable out of bed in NAD    INTERVAL HPI/OVERNIGHT EVENTS:      VITAL SIGNS:  T(F): 97.7 (01-05-24 @ 08:16)  HR: 69 (01-05-24 @ 08:16)  BP: 115/55 (01-05-24 @ 08:16)  RR: 19 (01-05-24 @ 08:16)  SpO2: 94% (01-05-24 @ 08:16)  Wt(kg): --  I&O's Detail          REVIEW OF SYSTEMS:    CONSTITUTIONAL:  No fevers, chills, sweats    HEENT:  Eyes:  No diplopia or blurred vision. ENT:  No earache, sore throat or runny nose.    CARDIOVASCULAR:  No pressure, squeezing, tightness, or heaviness about the chest; no palpitations.    RESPIRATORY:  Per HPI    GASTROINTESTINAL:  No abdominal pain, nausea, vomiting or diarrhea.    GENITOURINARY:  No dysuria, frequency or urgency.    NEUROLOGIC:  No paresthesias, fasciculations, seizures or weakness.    PSYCHIATRIC:  No disorder of thought or mood.      PHYSICAL EXAM:    Constitutional: Well developed and nourished  Eyes:Perrla  ENMT: normal  Neck:supple  Respiratory: good air entry  Cardiovascular: S1 S2 regular  Gastrointestinal: Soft, Non tender  Extremities: No edema  Vascular:normal  Neurological:Awake, alert,Ox3  Musculoskeletal:Normal      MEDICATIONS  (STANDING):  atorvastatin 20 milliGRAM(s) Oral at bedtime  fluticasone propionate 50 MICROgram(s)/spray Nasal Spray 1 Spray(s) Both Nostrils two times a day  guaiFENesin ER 1200 milliGRAM(s) Oral every 12 hours  heparin  Infusion. 1000 Unit(s)/Hr (10 mL/Hr) IV Continuous <Continuous>  insulin lispro (ADMELOG) corrective regimen sliding scale   SubCutaneous Before meals and at bedtime  metoprolol tartrate 12.5 milliGRAM(s) Oral two times a day  pantoprazole    Tablet 40 milliGRAM(s) Oral before breakfast  polyethylene glycol 3350 17 Gram(s) Oral daily  sacubitril 24 mG/valsartan 26 mG 1 Tablet(s) Oral two times a day  senna 2 Tablet(s) Oral at bedtime  warfarin 8 milliGRAM(s) Oral once    MEDICATIONS  (PRN):  acetaminophen     Tablet .. 650 milliGRAM(s) Oral every 6 hours PRN Temp greater or equal to 38C (100.4F), Mild Pain (1 - 3)  albuterol/ipratropium for Nebulization 3 milliLiter(s) Nebulizer every 6 hours PRN Shortness of Breath and/or Wheezing  ondansetron Injectable 4 milliGRAM(s) IV Push every 8 hours PRN Nausea and/or Vomiting  oxyCODONE    IR 5 milliGRAM(s) Oral every 6 hours PRN Moderate Pain (4 - 6)      Allergies    No Known Allergies    Intolerances        LABS:                        14.0   9.36  )-----------( 246      ( 05 Jan 2024 05:21 )             42.0     01-05    135  |  103  |  20<H>  ----------------------------<  97  3.6   |  28  |  1.26    Ca    9.2      05 Jan 2024 05:21  Mg     1.4     01-04      PT/INR - ( 05 Jan 2024 05:21 )   PT: 18.5 sec;   INR: 1.65 ratio         PTT - ( 05 Jan 2024 05:21 )  PTT:73.6 sec  Urinalysis Basic - ( 05 Jan 2024 05:21 )    Color: x / Appearance: x / SG: x / pH: x  Gluc: 97 mg/dL / Ketone: x  / Bili: x / Urobili: x   Blood: x / Protein: x / Nitrite: x   Leuk Esterase: x / RBC: x / WBC x   Sq Epi: x / Non Sq Epi: x / Bacteria: x            CAPILLARY BLOOD GLUCOSE      POCT Blood Glucose.: 85 mg/dL (05 Jan 2024 11:30)  POCT Blood Glucose.: 86 mg/dL (05 Jan 2024 07:40)  POCT Blood Glucose.: 95 mg/dL (04 Jan 2024 21:16)  POCT Blood Glucose.: 95 mg/dL (04 Jan 2024 16:30)  POCT Blood Glucose.: 94 mg/dL (04 Jan 2024 11:47)        RADIOLOGY & ADDITIONAL TESTS:    CXR:    Ct scan chest:    ekg;< from: Nuclear Stress Test-Pharmacologic.. (01.04.24 @ 08:14) >  --------------------------------------------------------------------------------------------------------------------------------------------------------Conclusions:   1. Normal myocardial perfusion scan, with no evidence of infarction or inducible ischemia.   2. Electrocardiogram ischemic changes: Non-diagnostic EKG portion of Lexiscan stress test.   3. Qualitative Perfusion:      - medium-sized, moderate to severe defect(s) in the inferior and inferolateral wall that is fixed suggestive of diaphragmatic attenuation artifact.   4. The left ventricle is normal in function and normal in size. The left ventricular EF% during stress is 56 %. The stress end diastolic volume is 50 ml and systolic volume is 22 ml.      < end of copied text >      echo: Patient is a 78y old  Male who presents with a chief complaint of Syncope (05 Jan 2024 09:57)  Awake, alert, comfortable out of bed in NAD. Awaiting for therapeutic INR    INTERVAL HPI/OVERNIGHT EVENTS:      VITAL SIGNS:  T(F): 97.7 (01-05-24 @ 08:16)  HR: 69 (01-05-24 @ 08:16)  BP: 115/55 (01-05-24 @ 08:16)  RR: 19 (01-05-24 @ 08:16)  SpO2: 94% (01-05-24 @ 08:16)  Wt(kg): --  I&O's Detail          REVIEW OF SYSTEMS:    CONSTITUTIONAL:  No fevers, chills, sweats    HEENT:  Eyes:  No diplopia or blurred vision. ENT:  No earache, sore throat or runny nose.    CARDIOVASCULAR:  No pressure, squeezing, tightness, or heaviness about the chest; no palpitations.    RESPIRATORY:  Per HPI    GASTROINTESTINAL:  No abdominal pain, nausea, vomiting or diarrhea.    GENITOURINARY:  No dysuria, frequency or urgency.    NEUROLOGIC:  No paresthesias, fasciculations, seizures or weakness.    PSYCHIATRIC:  No disorder of thought or mood.      PHYSICAL EXAM:    Constitutional: Well developed and nourished  Eyes:Perrla  ENMT: normal  Neck:supple  Respiratory: good air entry  Cardiovascular: S1 S2 regular  Gastrointestinal: Soft, Non tender  Extremities: No edema  Vascular:normal  Neurological:Awake, alert,Ox3  Musculoskeletal:Normal      MEDICATIONS  (STANDING):  atorvastatin 20 milliGRAM(s) Oral at bedtime  fluticasone propionate 50 MICROgram(s)/spray Nasal Spray 1 Spray(s) Both Nostrils two times a day  guaiFENesin ER 1200 milliGRAM(s) Oral every 12 hours  heparin  Infusion. 1000 Unit(s)/Hr (10 mL/Hr) IV Continuous <Continuous>  insulin lispro (ADMELOG) corrective regimen sliding scale   SubCutaneous Before meals and at bedtime  metoprolol tartrate 12.5 milliGRAM(s) Oral two times a day  pantoprazole    Tablet 40 milliGRAM(s) Oral before breakfast  polyethylene glycol 3350 17 Gram(s) Oral daily  sacubitril 24 mG/valsartan 26 mG 1 Tablet(s) Oral two times a day  senna 2 Tablet(s) Oral at bedtime  warfarin 8 milliGRAM(s) Oral once    MEDICATIONS  (PRN):  acetaminophen     Tablet .. 650 milliGRAM(s) Oral every 6 hours PRN Temp greater or equal to 38C (100.4F), Mild Pain (1 - 3)  albuterol/ipratropium for Nebulization 3 milliLiter(s) Nebulizer every 6 hours PRN Shortness of Breath and/or Wheezing  ondansetron Injectable 4 milliGRAM(s) IV Push every 8 hours PRN Nausea and/or Vomiting  oxyCODONE    IR 5 milliGRAM(s) Oral every 6 hours PRN Moderate Pain (4 - 6)      Allergies    No Known Allergies    Intolerances        LABS:                        14.0   9.36  )-----------( 246      ( 05 Jan 2024 05:21 )             42.0     01-05    135  |  103  |  20<H>  ----------------------------<  97  3.6   |  28  |  1.26    Ca    9.2      05 Jan 2024 05:21  Mg     1.4     01-04      PT/INR - ( 05 Jan 2024 05:21 )   PT: 18.5 sec;   INR: 1.65 ratio         PTT - ( 05 Jan 2024 05:21 )  PTT:73.6 sec  Urinalysis Basic - ( 05 Jan 2024 05:21 )    Color: x / Appearance: x / SG: x / pH: x  Gluc: 97 mg/dL / Ketone: x  / Bili: x / Urobili: x   Blood: x / Protein: x / Nitrite: x   Leuk Esterase: x / RBC: x / WBC x   Sq Epi: x / Non Sq Epi: x / Bacteria: x            CAPILLARY BLOOD GLUCOSE      POCT Blood Glucose.: 85 mg/dL (05 Jan 2024 11:30)  POCT Blood Glucose.: 86 mg/dL (05 Jan 2024 07:40)  POCT Blood Glucose.: 95 mg/dL (04 Jan 2024 21:16)  POCT Blood Glucose.: 95 mg/dL (04 Jan 2024 16:30)  POCT Blood Glucose.: 94 mg/dL (04 Jan 2024 11:47)        RADIOLOGY & ADDITIONAL TESTS:    CXR:    Ct scan chest:    ekg;< from: Nuclear Stress Test-Pharmacologic.. (01.04.24 @ 08:14) >  --------------------------------------------------------------------------------------------------------------------------------------------------------Conclusions:   1. Normal myocardial perfusion scan, with no evidence of infarction or inducible ischemia.   2. Electrocardiogram ischemic changes: Non-diagnostic EKG portion of Lexiscan stress test.   3. Qualitative Perfusion:      - medium-sized, moderate to severe defect(s) in the inferior and inferolateral wall that is fixed suggestive of diaphragmatic attenuation artifact.   4. The left ventricle is normal in function and normal in size. The left ventricular EF% during stress is 56 %. The stress end diastolic volume is 50 ml and systolic volume is 22 ml.      < end of copied text >      echo:

## 2024-01-05 NOTE — CHART NOTE - NSCHARTNOTEFT_GEN_A_CORE
Received call from pt's emergency contact/daughter Viky   and updated on pt's clinical condition and plan of care, discharge planning  All questions and concerns addressed.     Lashawn Palomino NP Medicine

## 2024-01-05 NOTE — PROGRESS NOTE ADULT - SUBJECTIVE AND OBJECTIVE BOX
NP Note discussed with primary attending.      Patient is a 78y old  Male who presents with a chief complaint of Fall (05 Jan 2024 11:45)      INTERVAL HPI/OVERNIGHT EVENTS:     MEDICATIONS  (STANDING):  atorvastatin 20 milliGRAM(s) Oral at bedtime  fluticasone propionate 50 MICROgram(s)/spray Nasal Spray 1 Spray(s) Both Nostrils two times a day  guaiFENesin ER 1200 milliGRAM(s) Oral every 12 hours  heparin  Infusion. 1000 Unit(s)/Hr (10 mL/Hr) IV Continuous <Continuous>  insulin lispro (ADMELOG) corrective regimen sliding scale   SubCutaneous Before meals and at bedtime  metoprolol tartrate 12.5 milliGRAM(s) Oral two times a day  pantoprazole    Tablet 40 milliGRAM(s) Oral before breakfast  polyethylene glycol 3350 17 Gram(s) Oral daily  sacubitril 24 mG/valsartan 26 mG 1 Tablet(s) Oral two times a day  senna 2 Tablet(s) Oral at bedtime  warfarin 8 milliGRAM(s) Oral once    MEDICATIONS  (PRN):  acetaminophen     Tablet .. 650 milliGRAM(s) Oral every 6 hours PRN Temp greater or equal to 38C (100.4F), Mild Pain (1 - 3)  albuterol/ipratropium for Nebulization 3 milliLiter(s) Nebulizer every 6 hours PRN Shortness of Breath and/or Wheezing  ondansetron Injectable 4 milliGRAM(s) IV Push every 8 hours PRN Nausea and/or Vomiting  oxyCODONE    IR 5 milliGRAM(s) Oral every 6 hours PRN Moderate Pain (4 - 6)      __________________________________________________  REVIEW OF SYSTEMS:    CONSTITUTIONAL: No fever,   EYES: no acute visual disturbances  NECK: No pain or stiffness  RESPIRATORY: No cough; No shortness of breath  CARDIOVASCULAR: No chest pain, no palpitations  GASTROINTESTINAL: No pain. No nausea or vomiting; No diarrhea   NEUROLOGICAL: No headache or numbness, no tremors  MUSCULOSKELETAL: No joint pain, no muscle pain  GENITOURINARY: no dysuria, no frequency, no hesitancy  PSYCHIATRY: no depression , no anxiety  ALL OTHER  ROS negative        Vital Signs Last 24 Hrs  T(C): 36.4 (05 Jan 2024 11:47), Max: 37.2 (05 Jan 2024 00:08)  T(F): 97.5 (05 Jan 2024 11:47), Max: 99 (05 Jan 2024 00:08)  HR: 61 (05 Jan 2024 11:47) (61 - 72)  BP: 107/58 (05 Jan 2024 11:47) (107/58 - 124/61)  BP(mean): --  RR: 18 (05 Jan 2024 11:47) (18 - 19)  SpO2: 97% (05 Jan 2024 11:47) (94% - 97%)    Parameters below as of 05 Jan 2024 11:47  Patient On (Oxygen Delivery Method): room air        ________________________________________________  PHYSICAL EXAM:  GENERAL: NAD  HEENT: Normocephalic;  conjunctivae and sclerae clear; moist mucous membranes;   NECK : supple  CHEST/LUNG: Clear to auscultation bilaterally with good air entry   HEART: S1 S2  regular; no murmurs, gallops or rubs  ABDOMEN: Soft, Nontender, Nondistended; Bowel sounds present  EXTREMITIES: no cyanosis; no edema; no calf tenderness  SKIN: warm and dry; no rash  NERVOUS SYSTEM:  Awake and alert; no new deficits    _________________________________________________  LABS:                        14.0   9.36  )-----------( 246      ( 05 Jan 2024 05:21 )             42.0     01-05    135  |  103  |  20<H>  ----------------------------<  97  3.6   |  28  |  1.26    Ca    9.2      05 Jan 2024 05:21  Mg     1.4     01-04      PT/INR - ( 05 Jan 2024 05:21 )   PT: 18.5 sec;   INR: 1.65 ratio         PTT - ( 05 Jan 2024 05:21 )  PTT:73.6 sec  Urinalysis Basic - ( 05 Jan 2024 05:21 )    Color: x / Appearance: x / SG: x / pH: x  Gluc: 97 mg/dL / Ketone: x  / Bili: x / Urobili: x   Blood: x / Protein: x / Nitrite: x   Leuk Esterase: x / RBC: x / WBC x   Sq Epi: x / Non Sq Epi: x / Bacteria: x      CAPILLARY BLOOD GLUCOSE      POCT Blood Glucose.: 85 mg/dL (05 Jan 2024 11:30)  POCT Blood Glucose.: 86 mg/dL (05 Jan 2024 07:40)  POCT Blood Glucose.: 95 mg/dL (04 Jan 2024 21:16)  POCT Blood Glucose.: 95 mg/dL (04 Jan 2024 16:30)        RADIOLOGY & ADDITIONAL TESTS:        Consultant(s) Notes Reviewed:   YES/ No    Care Discussed with Consultants :     Plan of care was discussed with patient and /or primary care giver; all questions and concerns were addressed and care was aligned with patient's wishes.     NP Note discussed with primary attending.      Patient is a 78y old  Male who presents with a chief complaint of Fall (05 Jan 2024 11:45)      INTERVAL HPI/OVERNIGHT EVENTS: No acute events overnight.   Pt seen and examined this morning. Pt endorses feeling okay, pain  to right side is  not too much and is better today. Tolerating po. Pt denies SOB, chest discomfort.     MEDICATIONS  (STANDING):  atorvastatin 20 milliGRAM(s) Oral at bedtime  fluticasone propionate 50 MICROgram(s)/spray Nasal Spray 1 Spray(s) Both Nostrils two times a day  guaiFENesin ER 1200 milliGRAM(s) Oral every 12 hours  heparin  Infusion. 1000 Unit(s)/Hr (10 mL/Hr) IV Continuous <Continuous>  insulin lispro (ADMELOG) corrective regimen sliding scale   SubCutaneous Before meals and at bedtime  metoprolol tartrate 12.5 milliGRAM(s) Oral two times a day  pantoprazole    Tablet 40 milliGRAM(s) Oral before breakfast  polyethylene glycol 3350 17 Gram(s) Oral daily  sacubitril 24 mG/valsartan 26 mG 1 Tablet(s) Oral two times a day  senna 2 Tablet(s) Oral at bedtime  warfarin 8 milliGRAM(s) Oral once    MEDICATIONS  (PRN):  acetaminophen     Tablet .. 650 milliGRAM(s) Oral every 6 hours PRN Temp greater or equal to 38C (100.4F), Mild Pain (1 - 3)  albuterol/ipratropium for Nebulization 3 milliLiter(s) Nebulizer every 6 hours PRN Shortness of Breath and/or Wheezing  ondansetron Injectable 4 milliGRAM(s) IV Push every 8 hours PRN Nausea and/or Vomiting  oxyCODONE    IR 5 milliGRAM(s) Oral every 6 hours PRN Moderate Pain (4 - 6)      __________________________________________________  REVIEW OF SYSTEMS:    CONSTITUTIONAL: No fever,   EYES: no acute visual disturbances  NECK: No pain or stiffness  RESPIRATORY: No cough; No shortness of breath  CARDIOVASCULAR: No chest pain, no palpitations  GASTROINTESTINAL: No pain. No nausea or vomiting; No diarrhea   NEUROLOGICAL: No headache or numbness, no tremors  MUSCULOSKELETAL: No joint pain, no muscle pain  GENITOURINARY: no dysuria, no frequency, no hesitancy  PSYCHIATRY: no depression , no anxiety  ALL OTHER  ROS negative        Vital Signs Last 24 Hrs  T(C): 36.4 (05 Jan 2024 11:47), Max: 37.2 (05 Jan 2024 00:08)  T(F): 97.5 (05 Jan 2024 11:47), Max: 99 (05 Jan 2024 00:08)  HR: 61 (05 Jan 2024 11:47) (61 - 72)  BP: 107/58 (05 Jan 2024 11:47) (107/58 - 124/61)  BP(mean): --  RR: 18 (05 Jan 2024 11:47) (18 - 19)  SpO2: 97% (05 Jan 2024 11:47) (94% - 97%)    Parameters below as of 05 Jan 2024 11:47  Patient On (Oxygen Delivery Method): room air        ________________________________________________  PHYSICAL EXAM:  GENERAL: NAD  HEENT: Normocephalic;  conjunctivae and sclerae clear; moist mucous membranes;   NECK : supple  CHEST/LUNG: Clear to auscultation bilaterally with good air entry   HEART: S1 S2  regular; no murmurs, gallops or rubs  ABDOMEN: Soft, Nontender, Nondistended; Bowel sounds present  EXTREMITIES: no cyanosis; no edema; no calf tenderness  SKIN: warm and dry; no rash  NERVOUS SYSTEM:  Awake and alert; no new deficits    _________________________________________________  LABS:                        14.0   9.36  )-----------( 246      ( 05 Jan 2024 05:21 )             42.0     01-05    135  |  103  |  20<H>  ----------------------------<  97  3.6   |  28  |  1.26    Ca    9.2      05 Jan 2024 05:21  Mg     1.4     01-04      PT/INR - ( 05 Jan 2024 05:21 )   PT: 18.5 sec;   INR: 1.65 ratio         PTT - ( 05 Jan 2024 05:21 )  PTT:73.6 sec  Urinalysis Basic - ( 05 Jan 2024 05:21 )    Color: x / Appearance: x / SG: x / pH: x  Gluc: 97 mg/dL / Ketone: x  / Bili: x / Urobili: x   Blood: x / Protein: x / Nitrite: x   Leuk Esterase: x / RBC: x / WBC x   Sq Epi: x / Non Sq Epi: x / Bacteria: x      CAPILLARY BLOOD GLUCOSE      POCT Blood Glucose.: 85 mg/dL (05 Jan 2024 11:30)  POCT Blood Glucose.: 86 mg/dL (05 Jan 2024 07:40)  POCT Blood Glucose.: 95 mg/dL (04 Jan 2024 21:16)  POCT Blood Glucose.: 95 mg/dL (04 Jan 2024 16:30)        RADIOLOGY & ADDITIONAL TESTS:        Consultant(s) Notes Reviewed:   YES/ No    Care Discussed with Consultants :     Plan of care was discussed with patient and /or primary care giver; all questions and concerns were addressed and care was aligned with patient's wishes.     NP Note discussed with primary attending.      Patient is a 78y old  Male who presents with a chief complaint of Fall (05 Jan 2024 11:45)      INTERVAL HPI/OVERNIGHT EVENTS: No acute events overnight.   Pt seen and examined this morning. Pt endorses feeling okay, pain  to right side is  not too much and is better today. Tolerating po. Pt denies SOB, chest discomfort.     MEDICATIONS  (STANDING):  atorvastatin 20 milliGRAM(s) Oral at bedtime  fluticasone propionate 50 MICROgram(s)/spray Nasal Spray 1 Spray(s) Both Nostrils two times a day  guaiFENesin ER 1200 milliGRAM(s) Oral every 12 hours  heparin  Infusion. 1000 Unit(s)/Hr (10 mL/Hr) IV Continuous <Continuous>  insulin lispro (ADMELOG) corrective regimen sliding scale   SubCutaneous Before meals and at bedtime  metoprolol tartrate 12.5 milliGRAM(s) Oral two times a day  pantoprazole    Tablet 40 milliGRAM(s) Oral before breakfast  polyethylene glycol 3350 17 Gram(s) Oral daily  sacubitril 24 mG/valsartan 26 mG 1 Tablet(s) Oral two times a day  senna 2 Tablet(s) Oral at bedtime  warfarin 8 milliGRAM(s) Oral once    MEDICATIONS  (PRN):  acetaminophen     Tablet .. 650 milliGRAM(s) Oral every 6 hours PRN Temp greater or equal to 38C (100.4F), Mild Pain (1 - 3)  albuterol/ipratropium for Nebulization 3 milliLiter(s) Nebulizer every 6 hours PRN Shortness of Breath and/or Wheezing  ondansetron Injectable 4 milliGRAM(s) IV Push every 8 hours PRN Nausea and/or Vomiting  oxyCODONE    IR 5 milliGRAM(s) Oral every 6 hours PRN Moderate Pain (4 - 6)      __________________________________________________  REVIEW OF SYSTEMS:    CONSTITUTIONAL: No fever,   EYES: no acute visual disturbances  NECK: No pain or stiffness  RESPIRATORY: No cough; No shortness of breath  CARDIOVASCULAR: No chest pain, no palpitations  GASTROINTESTINAL: No pain. No nausea or vomiting; No diarrhea   NEUROLOGICAL: No headache or numbness, no tremors  MUSCULOSKELETAL: No joint pain, no muscle pain  GENITOURINARY: no dysuria, no frequency, no hesitancy  PSYCHIATRY: no depression , no anxiety  ALL OTHER  ROS negative        Vital Signs Last 24 Hrs  T(C): 36.4 (05 Jan 2024 11:47), Max: 37.2 (05 Jan 2024 00:08)  T(F): 97.5 (05 Jan 2024 11:47), Max: 99 (05 Jan 2024 00:08)  HR: 61 (05 Jan 2024 11:47) (61 - 72)  BP: 107/58 (05 Jan 2024 11:47) (107/58 - 124/61)  BP(mean): --  RR: 18 (05 Jan 2024 11:47) (18 - 19)  SpO2: 97% (05 Jan 2024 11:47) (94% - 97%)    Parameters below as of 05 Jan 2024 11:47  Patient On (Oxygen Delivery Method): room air        ________________________________________________  PHYSICAL EXAM:  GENERAL: NAD  HEENT: Normocephalic;  conjunctivae and sclerae clear; moist mucous membranes;   NECK : supple  CHEST/LUNG: Clear to auscultation bilaterally with good air entry   HEART: S1 S2  regular; no murmurs, gallops or rubs  ABDOMEN: Soft, Nontender, Nondistended; Bowel sounds present  EXTREMITIES: no cyanosis; no edema; no calf tenderness  SKIN: warm and dry; no rash  NERVOUS SYSTEM:  Awake and alert; no new deficits    _________________________________________________  LABS:                        14.0   9.36  )-----------( 246      ( 05 Jan 2024 05:21 )             42.0     01-05    135  |  103  |  20<H>  ----------------------------<  97  3.6   |  28  |  1.26    Ca    9.2      05 Jan 2024 05:21  Mg     1.4     01-04      PT/INR - ( 05 Jan 2024 05:21 )   PT: 18.5 sec;   INR: 1.65 ratio         PTT - ( 05 Jan 2024 05:21 )  PTT:73.6 sec  Urinalysis Basic - ( 05 Jan 2024 05:21 )    Color: x / Appearance: x / SG: x / pH: x  Gluc: 97 mg/dL / Ketone: x  / Bili: x / Urobili: x   Blood: x / Protein: x / Nitrite: x   Leuk Esterase: x / RBC: x / WBC x   Sq Epi: x / Non Sq Epi: x / Bacteria: x      CAPILLARY BLOOD GLUCOSE      POCT Blood Glucose.: 85 mg/dL (05 Jan 2024 11:30)  POCT Blood Glucose.: 86 mg/dL (05 Jan 2024 07:40)  POCT Blood Glucose.: 95 mg/dL (04 Jan 2024 21:16)  POCT Blood Glucose.: 95 mg/dL (04 Jan 2024 16:30)        RADIOLOGY & ADDITIONAL TESTS:  < from: CT Chest No Cont (12.30.23 @ 20:43) >    ACC: [id number] EXAM:  CT CHEST   ORDERED BY: LOY MENDEZ     PROCEDURE DATE:  12/30/2023          INTERPRETATION:  CLINICAL INDICATION: R posterior rib pain fall syncope   yesterday    PROCEDURE: CT of the chest was performed without intravenous contrast.   Coronal and sagittal reconstruction images were obtained.    CONTRAST/COMPLICATIONS:  IV Contrast: NONE  Oral Contrast: NONE  Complications: None reported at time of study completion    COMPARISON: None.    FINDINGS: Evaluation ofthe thoracic organs/vasculature is limited   without intravenous contrast. Artifact from patient's arms and   respiratory motion degrades images.    LUNGS AND AIRWAYS: Patent central airways. Bronchial thickening with   areas of narrowing at the lungbases, which may be due to mucus plugging.   Linear opacities at the left > right lung base, suggesting   atelectasis/scarring.  PLEURA: No pleural effusion or pneumothorax.  HEART: Normal size. No pericardial effusion. Mitral annular and coronary   desiccation. Aortic valve replacement.  VESSELS: Atherosclerosis. Ectatic distal ascending aorta/ascending aortic   arch (4.0 cm). CABG.  MEDIASTINUM AND YARELI: Subcentimeter lymph nodes.  CHEST WALL AND LOWER NECK: Left-sided AICD/pacemaker. Median sternotomy.  UPPER ABDOMEN: Fatty atrophy of the visualized pancreas. Bilateral renal   cysts. Bilateral perinephric stranding. Nonobstructing 0.2 cm right renal   stone. Colon diverticulosis. Partially visualized visualized duodenal   diverticulum.  BONES: Acute fractures of the right sixth through ninth anterior ribs   (nondisplaced right sixth and seventh and minimally displaced right   eighth and ninth ribs). Corticated, slight deformities of additional   bilateral ribs, suggesting chronic. Degenerative changes of the spine.    IMPRESSION:    Evaluation of the thoracic organs/vasculature is limited without   intravenous contrast. Acute right-sided rib fractures. No obvious   pneumothorax.    Bibasilar atelectasis/scarring. Findings suggestive of mucus plugging at   the lung bases.    Additional findings as described.    < end of copied text >  < from: CT Cervical Spine No Cont (12.30.23 @ 20:43) >      IMPRESSION:    CT HEAD: Moderate anterior periventricular white matter ischemia.    Mild   global atrophy.  Moderate mucosal thickening in the BILATERAL ethmoid   sinuses and secretions in the RIGHT sphenoid sinus.    CT cervical spine:   No vertebral fracture is recognized.  Mild to   moderate degenerative disc disease and spondylosis at C3-4 C6-7 with loss   of disc height and associated degenerative endplate changes. There is   narrowing of the RIGHT C6/7 neural foramen due to uncovertebral spurring.   Mild posterior osteophytic ridge/disccomplexes at C4-5 through C6-7   flatten the ventral thecal sac.Prominence of the aortic arch. See chest   CT for details.    < end of copied text >  < from: Xray Chest 1 View-PORTABLE IMMEDIATE (Xray Chest 1 View-PORTABLE IMMEDIATE .) (12.30.23 @ 18:36) >  Comparison:  CT chest of the same day    IMPRESSION:  Borderline sized heart with prior open heart surgery and   pacemaker defibrillator identified.. Lungs are partially obscured by   pacemaker battery pack on the left side. The visualized lungs reveal   areas of linear atelectasis/fibrosis at the lower lung fields. Angles   sharp. Bones without acute abnormality.    < end of copied text >        Consultant(s) Notes Reviewed:   YES    Care Discussed with Consultants :     Plan of care was discussed with patient and /or primary care giver; all questions and concerns were addressed and care was aligned with patient's wishes.     NP Note discussed with primary attending.      Patient is a 78y old  Male who presents with a chief complaint of Fall (05 Jan 2024 11:45)      INTERVAL HPI/OVERNIGHT EVENTS: No acute events overnight.   Pt seen and examined this morning. Pt endorses feeling okay, pain  to right side is  not too much and is better today. Tolerating po. Pt denies SOB, chest discomfort.     MEDICATIONS  (STANDING):  atorvastatin 20 milliGRAM(s) Oral at bedtime  fluticasone propionate 50 MICROgram(s)/spray Nasal Spray 1 Spray(s) Both Nostrils two times a day  guaiFENesin ER 1200 milliGRAM(s) Oral every 12 hours  heparin  Infusion. 1000 Unit(s)/Hr (10 mL/Hr) IV Continuous <Continuous>  insulin lispro (ADMELOG) corrective regimen sliding scale   SubCutaneous Before meals and at bedtime  metoprolol tartrate 12.5 milliGRAM(s) Oral two times a day  pantoprazole    Tablet 40 milliGRAM(s) Oral before breakfast  polyethylene glycol 3350 17 Gram(s) Oral daily  sacubitril 24 mG/valsartan 26 mG 1 Tablet(s) Oral two times a day  senna 2 Tablet(s) Oral at bedtime  warfarin 8 milliGRAM(s) Oral once    MEDICATIONS  (PRN):  acetaminophen     Tablet .. 650 milliGRAM(s) Oral every 6 hours PRN Temp greater or equal to 38C (100.4F), Mild Pain (1 - 3)  albuterol/ipratropium for Nebulization 3 milliLiter(s) Nebulizer every 6 hours PRN Shortness of Breath and/or Wheezing  ondansetron Injectable 4 milliGRAM(s) IV Push every 8 hours PRN Nausea and/or Vomiting  oxyCODONE    IR 5 milliGRAM(s) Oral every 6 hours PRN Moderate Pain (4 - 6)      __________________________________________________  REVIEW OF SYSTEMS:    CONSTITUTIONAL: No fever,   EYES: no acute visual disturbances  NECK: No pain or stiffness  RESPIRATORY: No cough; No shortness of breath  CARDIOVASCULAR: No chest pain, no palpitations  GASTROINTESTINAL: No pain. No nausea or vomiting; No diarrhea   NEUROLOGICAL: No headache or numbness, no tremors  MUSCULOSKELETAL: No joint pain, no muscle pain  GENITOURINARY: no dysuria, no frequency, no hesitancy  PSYCHIATRY: no depression , no anxiety  ALL OTHER  ROS negative        Vital Signs Last 24 Hrs  T(C): 36.4 (05 Jan 2024 11:47), Max: 37.2 (05 Jan 2024 00:08)  T(F): 97.5 (05 Jan 2024 11:47), Max: 99 (05 Jan 2024 00:08)  HR: 61 (05 Jan 2024 11:47) (61 - 72)  BP: 107/58 (05 Jan 2024 11:47) (107/58 - 124/61)  BP(mean): --  RR: 18 (05 Jan 2024 11:47) (18 - 19)  SpO2: 97% (05 Jan 2024 11:47) (94% - 97%)    Parameters below as of 05 Jan 2024 11:47  Patient On (Oxygen Delivery Method): room air        ________________________________________________  PHYSICAL EXAM:  GENERAL: NAD  HEENT: Normocephalic;  conjunctivae and sclerae clear; moist mucous membranes;   NECK : supple  CHEST/LUNG: Clear to auscultation bilaterally with good air entry   HEART: S1 S2  regular; no murmurs, gallops or rubs  ABDOMEN: Soft, Nontender, Nondistended; Bowel sounds present  EXTREMITIES: no cyanosis; no edema; no calf tenderness  SKIN: warm and dry; no rash  NERVOUS SYSTEM:  Awake and alert; no new deficits    _________________________________________________  LABS:                        14.0   9.36  )-----------( 246      ( 05 Jan 2024 05:21 )             42.0     01-05    135  |  103  |  20<H>  ----------------------------<  97  3.6   |  28  |  1.26    Ca    9.2      05 Jan 2024 05:21  Mg     1.4     01-04      PT/INR - ( 05 Jan 2024 05:21 )   PT: 18.5 sec;   INR: 1.65 ratio         PTT - ( 05 Jan 2024 05:21 )  PTT:73.6 sec  Urinalysis Basic - ( 05 Jan 2024 05:21 )    Color: x / Appearance: x / SG: x / pH: x  Gluc: 97 mg/dL / Ketone: x  / Bili: x / Urobili: x   Blood: x / Protein: x / Nitrite: x   Leuk Esterase: x / RBC: x / WBC x   Sq Epi: x / Non Sq Epi: x / Bacteria: x      CAPILLARY BLOOD GLUCOSE      POCT Blood Glucose.: 85 mg/dL (05 Jan 2024 11:30)  POCT Blood Glucose.: 86 mg/dL (05 Jan 2024 07:40)  POCT Blood Glucose.: 95 mg/dL (04 Jan 2024 21:16)  POCT Blood Glucose.: 95 mg/dL (04 Jan 2024 16:30)        RADIOLOGY & ADDITIONAL TESTS:  < from: CT Chest No Cont (12.30.23 @ 20:43) >    ACC: [id number] EXAM:  CT CHEST   ORDERED BY: LOY MENDEZ     PROCEDURE DATE:  12/30/2023          INTERPRETATION:  CLINICAL INDICATION: R posterior rib pain fall syncope   yesterday    PROCEDURE: CT of the chest was performed without intravenous contrast.   Coronal and sagittal reconstruction images were obtained.    CONTRAST/COMPLICATIONS:  IV Contrast: NONE  Oral Contrast: NONE  Complications: None reported at time of study completion    COMPARISON: None.    FINDINGS: Evaluation ofthe thoracic organs/vasculature is limited   without intravenous contrast. Artifact from patient's arms and   respiratory motion degrades images.    LUNGS AND AIRWAYS: Patent central airways. Bronchial thickening with   areas of narrowing at the lungbases, which may be due to mucus plugging.   Linear opacities at the left > right lung base, suggesting   atelectasis/scarring.  PLEURA: No pleural effusion or pneumothorax.  HEART: Normal size. No pericardial effusion. Mitral annular and coronary   desiccation. Aortic valve replacement.  VESSELS: Atherosclerosis. Ectatic distal ascending aorta/ascending aortic   arch (4.0 cm). CABG.  MEDIASTINUM AND YARELI: Subcentimeter lymph nodes.  CHEST WALL AND LOWER NECK: Left-sided AICD/pacemaker. Median sternotomy.  UPPER ABDOMEN: Fatty atrophy of the visualized pancreas. Bilateral renal   cysts. Bilateral perinephric stranding. Nonobstructing 0.2 cm right renal   stone. Colon diverticulosis. Partially visualized visualized duodenal   diverticulum.  BONES: Acute fractures of the right sixth through ninth anterior ribs   (nondisplaced right sixth and seventh and minimally displaced right   eighth and ninth ribs). Corticated, slight deformities of additional   bilateral ribs, suggesting chronic. Degenerative changes of the spine.    IMPRESSION:    Evaluation of the thoracic organs/vasculature is limited without   intravenous contrast. Acute right-sided rib fractures. No obvious   pneumothorax.    Bibasilar atelectasis/scarring. Findings suggestive of mucus plugging at   the lung bases.    Additional findings as described.    < end of copied text >  < from: CT Cervical Spine No Cont (12.30.23 @ 20:43) >      IMPRESSION:    CT HEAD: Moderate anterior periventricular white matter ischemia.    Mild   global atrophy.  Moderate mucosal thickening in the BILATERAL ethmoid   sinuses and secretions in the RIGHT sphenoid sinus.    CT cervical spine:   No vertebral fracture is recognized.  Mild to   moderate degenerative disc disease and spondylosis at C3-4 C6-7 with loss   of disc height and associated degenerative endplate changes. There is   narrowing of the RIGHT C6/7 neural foramen due to uncovertebral spurring.   Mild posterior osteophytic ridge/disccomplexes at C4-5 through C6-7   flatten the ventral thecal sac.Prominence of the aortic arch. See chest   CT for details.    < end of copied text >  < from: Xray Chest 1 View-PORTABLE IMMEDIATE (Xray Chest 1 View-PORTABLE IMMEDIATE .) (12.30.23 @ 18:36) >  Comparison:  CT chest of the same day    IMPRESSION:  Borderline sized heart with prior open heart surgery and   pacemaker defibrillator identified.. Lungs are partially obscured by   pacemaker battery pack on the left side. The visualized lungs reveal   areas of linear atelectasis/fibrosis at the lower lung fields. Angles   sharp. Bones without acute abnormality.    < end of copied text >        Consultant(s) Notes Reviewed:   YES    Care Discussed with Consultants :     Plan of care was discussed with patient and /or primary care giver; all questions and concerns were addressed and care was aligned with patient's wishes.

## 2024-01-05 NOTE — PHARMACOTHERAPY INTERVENTION NOTE - COMMENTS
Today is day 6 of levofloxacin. Now with worsening renal function, serum creatinine = 1.31 mg/dL, suggest to dose adjust to 500 mg q48h, will complete therapy with this AM's dose.
Patient currently being bridged w/ heparin + warfarin due to mechanical valve replacement.    Suggest to switch heparin drip to full-dose enoxaparin (as renal function improving) for decreased blood draws and easier administration.    No contraindications for use of low-molecular weight heparin, it is used regularly when bridging patients with mechanical valve replacement (see ACC/AHA Guideline for the Management of Patients With Valvular Heart Disease).  
INR still subtherapeutic despite last 2 doses of warfarin 7 mg.    Suggest to increase warfarin dose to 8 mg and check PT/INR w/ AM labs.

## 2024-01-05 NOTE — PROGRESS NOTE ADULT - PROBLEM SELECTOR PLAN 3
CT chest: Evaluation of the thoracic organs/vasculature is limited without intravenous contrast.                 Acute right-sided rib fractures.   Tylenol - mild pain.   Oxycodone - moderate pain.   Lidocaine patch.  incentive spirometer.

## 2024-01-05 NOTE — PROGRESS NOTE ADULT - SUBJECTIVE AND OBJECTIVE BOX
Patient is a 78y old  Male who presents with a chief complaint of Fall (04 Jan 2024 11:46)    pt seen in tele [ x ], reg med floor [   ], bed [ x ], chair at bedside [   ], a+ o x3 [ x ],   lethargic [  ],  nad [ x ]        Allergies    No Known Allergies        Vitals    T(F): 98.2 (01-05-24 @ 05:12), Max: 99 (01-05-24 @ 00:08)  HR: 65 (01-05-24 @ 05:12) (64 - 86)  BP: 112/45 (01-05-24 @ 05:12) (107/64 - 124/61)  RR: 18 (01-05-24 @ 05:12) (18 - 18)  SpO2: 94% (01-05-24 @ 05:12) (92% - 95%)  Wt(kg): --  CAPILLARY BLOOD GLUCOSE      POCT Blood Glucose.: 95 mg/dL (04 Jan 2024 21:16)      Labs                          14.0   9.36  )-----------( 246      ( 05 Jan 2024 05:21 )             42.0       01-05    135  |  103  |  20<H>  ----------------------------<  97  3.6   |  28  |  1.26    Ca    9.2      05 Jan 2024 05:21  Mg     1.4     01-04                  Radiology Results      Meds    MEDICATIONS  (STANDING):  atorvastatin 20 milliGRAM(s) Oral at bedtime  fluticasone propionate 50 MICROgram(s)/spray Nasal Spray 1 Spray(s) Both Nostrils two times a day  guaiFENesin ER 1200 milliGRAM(s) Oral every 12 hours  heparin  Infusion. 1000 Unit(s)/Hr (10 mL/Hr) IV Continuous <Continuous>  insulin lispro (ADMELOG) corrective regimen sliding scale   SubCutaneous Before meals and at bedtime  levoFLOXacin  Tablet 500 milliGRAM(s) Oral once  metoprolol tartrate 12.5 milliGRAM(s) Oral two times a day  pantoprazole    Tablet 40 milliGRAM(s) Oral before breakfast  polyethylene glycol 3350 17 Gram(s) Oral daily  sacubitril 24 mG/valsartan 26 mG 1 Tablet(s) Oral two times a day  senna 2 Tablet(s) Oral at bedtime  warfarin 7 milliGRAM(s) Oral at bedtime      MEDICATIONS  (PRN):  acetaminophen     Tablet .. 650 milliGRAM(s) Oral every 6 hours PRN Temp greater or equal to 38C (100.4F), Mild Pain (1 - 3)  albuterol/ipratropium for Nebulization 3 milliLiter(s) Nebulizer every 6 hours PRN Shortness of Breath and/or Wheezing  ondansetron Injectable 4 milliGRAM(s) IV Push every 8 hours PRN Nausea and/or Vomiting  oxyCODONE    IR 5 milliGRAM(s) Oral every 6 hours PRN Moderate Pain (4 - 6)      Physical Exam    Neuro :  no focal deficits  Respiratory: CTA B/L  CV: RRR, S1S2, no murmurs,   Abdominal: Soft, NT, ND +BS,  Extremities: No edema, + peripheral pulses    ASSESSMENT    syncope pos 2nd to severe cough,   cns patho r/o,   troponinemia,   r/o acs,   right rib pain 2nd to fx,   s/p fall,   sinusitis,   cindy   h/o htn,   chf,   ppm,   dm,   PAF   aortic valve replacement (? mechanical)      PLAN    d/c tele,   statin,   neuro cons noted   Syncope in patient with dizziness and +jose orthostatics cw orthostatic syncope.  orthostatic bp q shift   trop x 4 positive noted above   cont coumadin to 7mg qhs  f/u inr   goal inr 2.5 to 3.5   cont hep drip as per hospital normogram  cardio f/u   cont Entresto   AICD interrogated with Episodes: Multiple episodes of NSVT and PMT at rate of 110bmp.  No events on the day of admission. noChanges made: Normal device function.  cont statin, lopressor.  echo with the left ventricular systolic function appears grossly normal.   There is mild (grade 1) left ventricular diastolic dysfunction noted above.    f/u stress test    pulm f/u    robitussin, albuterol,   flonase nasal spray,   levaquin, 500mg daily, to complete 7 days   serum potassium wnl   lispro ss,   hgba1c 6.2 noted    phys tx eval noted and rec No skilled PT needs  cont current meds        Patient is a 78y old  Male who presents with a chief complaint of Fall (04 Jan 2024 11:46)    pt seen in tele [ x ], reg med floor [   ], bed [ x ], chair at bedside [   ], a+ o x3 [ x ],   lethargic [  ],  nad [ x ]        Allergies    No Known Allergies        Vitals    T(F): 98.2 (01-05-24 @ 05:12), Max: 99 (01-05-24 @ 00:08)  HR: 65 (01-05-24 @ 05:12) (64 - 86)  BP: 112/45 (01-05-24 @ 05:12) (107/64 - 124/61)  RR: 18 (01-05-24 @ 05:12) (18 - 18)  SpO2: 94% (01-05-24 @ 05:12) (92% - 95%)  Wt(kg): --  CAPILLARY BLOOD GLUCOSE      POCT Blood Glucose.: 95 mg/dL (04 Jan 2024 21:16)      Labs                          14.0   9.36  )-----------( 246      ( 05 Jan 2024 05:21 )             42.0       01-05    135  |  103  |  20<H>  ----------------------------<  97  3.6   |  28  |  1.26    Ca    9.2      05 Jan 2024 05:21  Mg     1.4     01-04                  Radiology Results      Meds    MEDICATIONS  (STANDING):  atorvastatin 20 milliGRAM(s) Oral at bedtime  fluticasone propionate 50 MICROgram(s)/spray Nasal Spray 1 Spray(s) Both Nostrils two times a day  guaiFENesin ER 1200 milliGRAM(s) Oral every 12 hours  heparin  Infusion. 1000 Unit(s)/Hr (10 mL/Hr) IV Continuous <Continuous>  insulin lispro (ADMELOG) corrective regimen sliding scale   SubCutaneous Before meals and at bedtime  levoFLOXacin  Tablet 500 milliGRAM(s) Oral once  metoprolol tartrate 12.5 milliGRAM(s) Oral two times a day  pantoprazole    Tablet 40 milliGRAM(s) Oral before breakfast  polyethylene glycol 3350 17 Gram(s) Oral daily  sacubitril 24 mG/valsartan 26 mG 1 Tablet(s) Oral two times a day  senna 2 Tablet(s) Oral at bedtime  warfarin 7 milliGRAM(s) Oral at bedtime      MEDICATIONS  (PRN):  acetaminophen     Tablet .. 650 milliGRAM(s) Oral every 6 hours PRN Temp greater or equal to 38C (100.4F), Mild Pain (1 - 3)  albuterol/ipratropium for Nebulization 3 milliLiter(s) Nebulizer every 6 hours PRN Shortness of Breath and/or Wheezing  ondansetron Injectable 4 milliGRAM(s) IV Push every 8 hours PRN Nausea and/or Vomiting  oxyCODONE    IR 5 milliGRAM(s) Oral every 6 hours PRN Moderate Pain (4 - 6)      Physical Exam    Neuro :  no focal deficits  Respiratory: CTA B/L  CV: RRR, S1S2, no murmurs,   Abdominal: Soft, NT, ND +BS,  Extremities: No edema, + peripheral pulses    ASSESSMENT    syncope pos 2nd to severe cough,   cns patho r/o,   troponinemia,   r/o acs,   right rib pain 2nd to fx,   s/p fall,   sinusitis,   cnidy   h/o htn,   chf,   ppm,   dm,   PAF   aortic valve replacement (? mechanical)      PLAN    d/c tele,   statin,   neuro cons noted   Syncope in patient with dizziness and +jose orthostatics cw orthostatic syncope.  orthostatic bp q shift   trop x 4 positive noted above   cont coumadin to 7mg qhs  f/u inr   goal inr 2.5 to 3.5   cont hep drip as per hospital normogram  cardio f/u   cont Entresto   AICD interrogated with Episodes: Multiple episodes of NSVT and PMT at rate of 110bmp.  No events on the day of admission. noChanges made: Normal device function.  cont statin, lopressor.  echo with the left ventricular systolic function appears grossly normal.   There is mild (grade 1) left ventricular diastolic dysfunction noted above.    f/u stress test    pulm f/u    robitussin, albuterol,   flonase nasal spray,   levaquin, 500mg daily, to complete 7 days   serum potassium wnl   lispro ss,   hgba1c 6.2 noted    phys tx eval noted and rec No skilled PT needs  cont current meds        Patient is a 78y old  Male who presents with a chief complaint of Fall (04 Jan 2024 11:46)    pt seen in tele [ x ], reg med floor [   ], bed [ x ], chair at bedside [   ], a+ o x3 [ x ],   lethargic [  ],  nad [ x ]        Allergies    No Known Allergies        Vitals    T(F): 98.2 (01-05-24 @ 05:12), Max: 99 (01-05-24 @ 00:08)  HR: 65 (01-05-24 @ 05:12) (64 - 86)  BP: 112/45 (01-05-24 @ 05:12) (107/64 - 124/61)  RR: 18 (01-05-24 @ 05:12) (18 - 18)  SpO2: 94% (01-05-24 @ 05:12) (92% - 95%)  Wt(kg): --  CAPILLARY BLOOD GLUCOSE      POCT Blood Glucose.: 95 mg/dL (04 Jan 2024 21:16)      Labs                          14.0   9.36  )-----------( 246      ( 05 Jan 2024 05:21 )             42.0       01-05    135  |  103  |  20<H>  ----------------------------<  97  3.6   |  28  |  1.26    Ca    9.2      05 Jan 2024 05:21  Mg     1.4     01-04      Prothrombin Time and INR, Plasma in AM (01.05.24 @ 05:21)   Prothrombin Time, Plasma: 18.5 sec  INR: 1.65:     < from: Nuclear Stress Test-Pharmacologic.. (01.04.24 @ 08:14) >  Conclusions:   1. Normal myocardial perfusion scan, with no evidence of infarction or inducible ischemia.   2. Electrocardiogram ischemic changes: Non-diagnostic EKG portion of Lexiscan stress test.   3. Qualitative Perfusion:      - medium-sized, moderate to severe defect(s) in the inferior and inferolateral wall that is fixed suggestive of diaphragmatic attenuation artifact.   4. The left ventricle is normal in function and normal in size. The left ventricular EF% during stress is 56 %. The stress end diastolic volume is 50 ml and systolic volume is 22 ml.    < end of copied text >          Radiology Results      Meds    MEDICATIONS  (STANDING):  atorvastatin 20 milliGRAM(s) Oral at bedtime  fluticasone propionate 50 MICROgram(s)/spray Nasal Spray 1 Spray(s) Both Nostrils two times a day  guaiFENesin ER 1200 milliGRAM(s) Oral every 12 hours  heparin  Infusion. 1000 Unit(s)/Hr (10 mL/Hr) IV Continuous <Continuous>  insulin lispro (ADMELOG) corrective regimen sliding scale   SubCutaneous Before meals and at bedtime  levoFLOXacin  Tablet 500 milliGRAM(s) Oral once  metoprolol tartrate 12.5 milliGRAM(s) Oral two times a day  pantoprazole    Tablet 40 milliGRAM(s) Oral before breakfast  polyethylene glycol 3350 17 Gram(s) Oral daily  sacubitril 24 mG/valsartan 26 mG 1 Tablet(s) Oral two times a day  senna 2 Tablet(s) Oral at bedtime  warfarin 7 milliGRAM(s) Oral at bedtime      MEDICATIONS  (PRN):  acetaminophen     Tablet .. 650 milliGRAM(s) Oral every 6 hours PRN Temp greater or equal to 38C (100.4F), Mild Pain (1 - 3)  albuterol/ipratropium for Nebulization 3 milliLiter(s) Nebulizer every 6 hours PRN Shortness of Breath and/or Wheezing  ondansetron Injectable 4 milliGRAM(s) IV Push every 8 hours PRN Nausea and/or Vomiting  oxyCODONE    IR 5 milliGRAM(s) Oral every 6 hours PRN Moderate Pain (4 - 6)      Physical Exam    Neuro :  no focal deficits  Respiratory: CTA B/L  CV: RRR, S1S2, no murmurs,   Abdominal: Soft, NT, ND +BS,  Extremities: No edema, + peripheral pulses    ASSESSMENT    syncope pos 2nd to severe cough,   cns patho r/o,   troponinemia,   r/o acs,   right rib pain 2nd to fx,   s/p fall,   sinusitis,   cindy   h/o htn,   chf,   ppm,   dm,   PAF   aortic valve replacement (? mechanical)      PLAN    d/c tele,   statin,   neuro cons noted   Syncope in patient with dizziness and +jose orthostatics cw orthostatic syncope.  orthostatic bp q shift   trop x 4 positive noted above   cont coumadin to 7mg qhs  inr subtherapeutic noted above   goal inr 2.5 to 3.5   cont hep drip as per hospital normogram  cardio f/u   cont Entresto   AICD interrogated with Episodes: Multiple episodes of NSVT and PMT at rate of 110bmp.  No events on the day of admission. noChanges made: Normal device function.  cont statin, lopressor.  echo with the left ventricular systolic function appears grossly normal.   There is mild (grade 1) left ventricular diastolic dysfunction noted    stress test neg noted above    pulm f/u    robitussin, albuterol,   flonase nasal spray,   completed course of levaquin,   serum potassium wnl   lispro ss,   hgba1c 6.2 noted    phys tx eval noted and rec No skilled PT needs  cont current meds   d/c plan pending goal inr

## 2024-01-05 NOTE — PROGRESS NOTE ADULT - PROBLEM SELECTOR PLAN 12
On heparin bridging to coumadin  GI PPX: PPI    Discharge:  INR no therapeutic on coumadin
Pt on heparin drip bridge to coumadin   GI ppx: PPI    Dispo: to home pending therapeutic INR (2.5-3.5).
On heparin bridging to coumadin

## 2024-01-05 NOTE — PROGRESS NOTE ADULT - ASSESSMENT
79 y/o M with PMH of DM, HTN, HLD, PAF, CHF, S/P defibrillator,S/P CABG and AVR  who presented after an episode of syncope,acute lt sided rib fx,sinusitis,BEVERLY.  1.D/C Tele monitoring.  2.Interrogation of  AICD q3mo.  3.Echocardiogram as above..  4.PAF,mech AVR-coumadin and heparin drip until INR >2.5.  5.Borderline troponins-Stress test -no ischemia.  6.CHF by history-b blocker.Entresto.  7.CAD-statin,lopressor.   77 y/o M with PMH of DM, HTN, HLD, PAF, CHF, S/P defibrillator,S/P CABG and AVR  who presented after an episode of syncope,acute lt sided rib fx,sinusitis,BEVERLY.  1.D/C Tele monitoring.  2.Interrogation of  AICD q3mo.  3.Echocardiogram as above..  4.PAF,mech AVR-coumadin and heparin drip until INR >2.5.  5.Borderline troponins-Stress test -no ischemia.  6.CHF by history-b blocker.Entresto.  7.CAD-statin,lopressor.

## 2024-01-05 NOTE — PROGRESS NOTE ADULT - PROBLEM SELECTOR PLAN 1
P/w syncope   RVP negative x 2   CT HEAD as above  Echo noted  s/p IV hydration for orthostatic hypotension -->> resolved.   ICD interrogated 1/2/24 with normal device function  s/p tele.   PT endorses  NO SKILLED NEEDS  stress test 1/4 findings normal myocardial perfusion scan, with no evidence of infarction or inducible ischemia.  Cardiology  Dr. Toure. following   Neurology  Dr. Coffey consulted, input appreciated.

## 2024-01-05 NOTE — PROGRESS NOTE ADULT - PROBLEM SELECTOR PLAN 5
Ventricular rate controlled  Continue metoprolol 12.5mg po BID  On warfarin at home  Continue heparin drip with bridge to coumadin .   Coumadin 8mg po tonight.   Monitor INR , goal 2.5-3.5 for mechanical valve.   F/u with Card if okay to switch to Lovenox in setting of improved CrCL.  Card Dr. Toure following

## 2024-01-06 LAB
ANION GAP SERPL CALC-SCNC: 4 MMOL/L — LOW (ref 5–17)
ANION GAP SERPL CALC-SCNC: 4 MMOL/L — LOW (ref 5–17)
APTT BLD: 115.4 SEC — HIGH (ref 24.5–35.6)
APTT BLD: 115.4 SEC — HIGH (ref 24.5–35.6)
APTT BLD: 160.8 SEC — CRITICAL HIGH (ref 24.5–35.6)
APTT BLD: 160.8 SEC — CRITICAL HIGH (ref 24.5–35.6)
APTT BLD: 56.9 SEC — HIGH (ref 24.5–35.6)
APTT BLD: 56.9 SEC — HIGH (ref 24.5–35.6)
BUN SERPL-MCNC: 18 MG/DL — SIGNIFICANT CHANGE UP (ref 7–18)
BUN SERPL-MCNC: 18 MG/DL — SIGNIFICANT CHANGE UP (ref 7–18)
CALCIUM SERPL-MCNC: 9.2 MG/DL — SIGNIFICANT CHANGE UP (ref 8.4–10.5)
CALCIUM SERPL-MCNC: 9.2 MG/DL — SIGNIFICANT CHANGE UP (ref 8.4–10.5)
CHLORIDE SERPL-SCNC: 104 MMOL/L — SIGNIFICANT CHANGE UP (ref 96–108)
CHLORIDE SERPL-SCNC: 104 MMOL/L — SIGNIFICANT CHANGE UP (ref 96–108)
CO2 SERPL-SCNC: 27 MMOL/L — SIGNIFICANT CHANGE UP (ref 22–31)
CO2 SERPL-SCNC: 27 MMOL/L — SIGNIFICANT CHANGE UP (ref 22–31)
CREAT SERPL-MCNC: 1.15 MG/DL — SIGNIFICANT CHANGE UP (ref 0.5–1.3)
CREAT SERPL-MCNC: 1.15 MG/DL — SIGNIFICANT CHANGE UP (ref 0.5–1.3)
EGFR: 66 ML/MIN/1.73M2 — SIGNIFICANT CHANGE UP
EGFR: 66 ML/MIN/1.73M2 — SIGNIFICANT CHANGE UP
GLUCOSE BLDC GLUCOMTR-MCNC: 105 MG/DL — HIGH (ref 70–99)
GLUCOSE BLDC GLUCOMTR-MCNC: 105 MG/DL — HIGH (ref 70–99)
GLUCOSE BLDC GLUCOMTR-MCNC: 107 MG/DL — HIGH (ref 70–99)
GLUCOSE BLDC GLUCOMTR-MCNC: 107 MG/DL — HIGH (ref 70–99)
GLUCOSE BLDC GLUCOMTR-MCNC: 110 MG/DL — HIGH (ref 70–99)
GLUCOSE BLDC GLUCOMTR-MCNC: 110 MG/DL — HIGH (ref 70–99)
GLUCOSE BLDC GLUCOMTR-MCNC: 91 MG/DL — SIGNIFICANT CHANGE UP (ref 70–99)
GLUCOSE BLDC GLUCOMTR-MCNC: 91 MG/DL — SIGNIFICANT CHANGE UP (ref 70–99)
GLUCOSE SERPL-MCNC: 96 MG/DL — SIGNIFICANT CHANGE UP (ref 70–99)
GLUCOSE SERPL-MCNC: 96 MG/DL — SIGNIFICANT CHANGE UP (ref 70–99)
HCT VFR BLD CALC: 43.5 % — SIGNIFICANT CHANGE UP (ref 39–50)
HCT VFR BLD CALC: 43.5 % — SIGNIFICANT CHANGE UP (ref 39–50)
HGB BLD-MCNC: 14.6 G/DL — SIGNIFICANT CHANGE UP (ref 13–17)
HGB BLD-MCNC: 14.6 G/DL — SIGNIFICANT CHANGE UP (ref 13–17)
INR BLD: 1.99 RATIO — HIGH (ref 0.85–1.18)
INR BLD: 1.99 RATIO — HIGH (ref 0.85–1.18)
INR BLD: 2.14 RATIO — HIGH (ref 0.85–1.18)
INR BLD: 2.14 RATIO — HIGH (ref 0.85–1.18)
MAGNESIUM SERPL-MCNC: 1.5 MG/DL — LOW (ref 1.6–2.6)
MAGNESIUM SERPL-MCNC: 1.5 MG/DL — LOW (ref 1.6–2.6)
MCHC RBC-ENTMCNC: 31.1 PG — SIGNIFICANT CHANGE UP (ref 27–34)
MCHC RBC-ENTMCNC: 31.1 PG — SIGNIFICANT CHANGE UP (ref 27–34)
MCHC RBC-ENTMCNC: 33.6 GM/DL — SIGNIFICANT CHANGE UP (ref 32–36)
MCHC RBC-ENTMCNC: 33.6 GM/DL — SIGNIFICANT CHANGE UP (ref 32–36)
MCV RBC AUTO: 92.8 FL — SIGNIFICANT CHANGE UP (ref 80–100)
MCV RBC AUTO: 92.8 FL — SIGNIFICANT CHANGE UP (ref 80–100)
NRBC # BLD: 0 /100 WBCS — SIGNIFICANT CHANGE UP (ref 0–0)
NRBC # BLD: 0 /100 WBCS — SIGNIFICANT CHANGE UP (ref 0–0)
PLATELET # BLD AUTO: 247 K/UL — SIGNIFICANT CHANGE UP (ref 150–400)
PLATELET # BLD AUTO: 247 K/UL — SIGNIFICANT CHANGE UP (ref 150–400)
POTASSIUM SERPL-MCNC: 4.1 MMOL/L — SIGNIFICANT CHANGE UP (ref 3.5–5.3)
POTASSIUM SERPL-MCNC: 4.1 MMOL/L — SIGNIFICANT CHANGE UP (ref 3.5–5.3)
POTASSIUM SERPL-SCNC: 4.1 MMOL/L — SIGNIFICANT CHANGE UP (ref 3.5–5.3)
POTASSIUM SERPL-SCNC: 4.1 MMOL/L — SIGNIFICANT CHANGE UP (ref 3.5–5.3)
PROTHROM AB SERPL-ACNC: 22.2 SEC — HIGH (ref 9.5–13)
PROTHROM AB SERPL-ACNC: 22.2 SEC — HIGH (ref 9.5–13)
PROTHROM AB SERPL-ACNC: 23.9 SEC — HIGH (ref 9.5–13)
PROTHROM AB SERPL-ACNC: 23.9 SEC — HIGH (ref 9.5–13)
RBC # BLD: 4.69 M/UL — SIGNIFICANT CHANGE UP (ref 4.2–5.8)
RBC # BLD: 4.69 M/UL — SIGNIFICANT CHANGE UP (ref 4.2–5.8)
RBC # FLD: 13.8 % — SIGNIFICANT CHANGE UP (ref 10.3–14.5)
RBC # FLD: 13.8 % — SIGNIFICANT CHANGE UP (ref 10.3–14.5)
SODIUM SERPL-SCNC: 135 MMOL/L — SIGNIFICANT CHANGE UP (ref 135–145)
SODIUM SERPL-SCNC: 135 MMOL/L — SIGNIFICANT CHANGE UP (ref 135–145)
WBC # BLD: 8.29 K/UL — SIGNIFICANT CHANGE UP (ref 3.8–10.5)
WBC # BLD: 8.29 K/UL — SIGNIFICANT CHANGE UP (ref 3.8–10.5)
WBC # FLD AUTO: 8.29 K/UL — SIGNIFICANT CHANGE UP (ref 3.8–10.5)
WBC # FLD AUTO: 8.29 K/UL — SIGNIFICANT CHANGE UP (ref 3.8–10.5)

## 2024-01-06 RX ORDER — MAGNESIUM SULFATE 500 MG/ML
1 VIAL (ML) INJECTION ONCE
Refills: 0 | Status: COMPLETED | OUTPATIENT
Start: 2024-01-06 | End: 2024-01-06

## 2024-01-06 RX ORDER — MAGNESIUM OXIDE 400 MG ORAL TABLET 241.3 MG
400 TABLET ORAL
Refills: 0 | Status: COMPLETED | OUTPATIENT
Start: 2024-01-06 | End: 2024-01-06

## 2024-01-06 RX ORDER — WARFARIN SODIUM 2.5 MG/1
7 TABLET ORAL AT BEDTIME
Refills: 0 | Status: DISCONTINUED | OUTPATIENT
Start: 2024-01-06 | End: 2024-01-07

## 2024-01-06 RX ADMIN — HEPARIN SODIUM 600 UNIT(S)/HR: 5000 INJECTION INTRAVENOUS; SUBCUTANEOUS at 22:03

## 2024-01-06 RX ADMIN — MAGNESIUM OXIDE 400 MG ORAL TABLET 400 MILLIGRAM(S): 241.3 TABLET ORAL at 12:07

## 2024-01-06 RX ADMIN — Medication 1200 MILLIGRAM(S): at 06:07

## 2024-01-06 RX ADMIN — SACUBITRIL AND VALSARTAN 1 TABLET(S): 24; 26 TABLET, FILM COATED ORAL at 06:05

## 2024-01-06 RX ADMIN — Medication 1200 MILLIGRAM(S): at 17:34

## 2024-01-06 RX ADMIN — Medication 1 SPRAY(S): at 17:34

## 2024-01-06 RX ADMIN — PANTOPRAZOLE SODIUM 40 MILLIGRAM(S): 20 TABLET, DELAYED RELEASE ORAL at 06:08

## 2024-01-06 RX ADMIN — Medication 12.5 MILLIGRAM(S): at 17:35

## 2024-01-06 RX ADMIN — Medication 100 GRAM(S): at 09:58

## 2024-01-06 RX ADMIN — HEPARIN SODIUM 1000 UNIT(S)/HR: 5000 INJECTION INTRAVENOUS; SUBCUTANEOUS at 07:42

## 2024-01-06 RX ADMIN — HEPARIN SODIUM 800 UNIT(S)/HR: 5000 INJECTION INTRAVENOUS; SUBCUTANEOUS at 07:21

## 2024-01-06 RX ADMIN — HEPARIN SODIUM 800 UNIT(S)/HR: 5000 INJECTION INTRAVENOUS; SUBCUTANEOUS at 17:52

## 2024-01-06 RX ADMIN — SACUBITRIL AND VALSARTAN 1 TABLET(S): 24; 26 TABLET, FILM COATED ORAL at 17:34

## 2024-01-06 RX ADMIN — SENNA PLUS 2 TABLET(S): 8.6 TABLET ORAL at 21:35

## 2024-01-06 RX ADMIN — Medication 12.5 MILLIGRAM(S): at 06:05

## 2024-01-06 RX ADMIN — HEPARIN SODIUM 800 UNIT(S)/HR: 5000 INJECTION INTRAVENOUS; SUBCUTANEOUS at 15:17

## 2024-01-06 RX ADMIN — HEPARIN SODIUM 800 UNIT(S)/HR: 5000 INJECTION INTRAVENOUS; SUBCUTANEOUS at 19:37

## 2024-01-06 RX ADMIN — Medication 1 SPRAY(S): at 06:08

## 2024-01-06 RX ADMIN — WARFARIN SODIUM 7 MILLIGRAM(S): 2.5 TABLET ORAL at 21:37

## 2024-01-06 RX ADMIN — MAGNESIUM OXIDE 400 MG ORAL TABLET 400 MILLIGRAM(S): 241.3 TABLET ORAL at 09:58

## 2024-01-06 RX ADMIN — HEPARIN SODIUM 0 UNIT(S)/HR: 5000 INJECTION INTRAVENOUS; SUBCUTANEOUS at 14:16

## 2024-01-06 RX ADMIN — ATORVASTATIN CALCIUM 20 MILLIGRAM(S): 80 TABLET, FILM COATED ORAL at 21:36

## 2024-01-06 NOTE — PROGRESS NOTE ADULT - SUBJECTIVE AND OBJECTIVE BOX
Date of Service 01-06-24 @ 10:20    CHIEF COMPLAINT:Patient is a 78y old  Male who presents with a chief complaint of syncope.Pt appears comfortable.    	  REVIEW OF SYSTEMS:  CONSTITUTIONAL: No fever, weight loss, or fatigue  EYES: No eye pain, visual disturbances, or discharge  ENT:  No difficulty hearing, tinnitus, vertigo; No sinus or throat pain  NECK: No pain or stiffness  RESPIRATORY: No cough, wheezing, chills or hemoptysis; No Shortness of Breath  CARDIOVASCULAR: No chest pain, palpitations, passing out, dizziness, or leg swelling  GASTROINTESTINAL: No abdominal or epigastric pain. No nausea, vomiting, or hematemesis; No diarrhea or constipation. No melena or hematochezia.  GENITOURINARY: No dysuria, frequency, hematuria, or incontinence  NEUROLOGICAL: No headaches, memory loss, loss of strength, numbness, or tremors  SKIN: No itching, burning, rashes, or lesions   LYMPH Nodes: No enlarged glands  ENDOCRINE: No heat or cold intolerance; No hair loss  MUSCULOSKELETAL: No joint pain or swelling; No muscle, back, or extremity pain  PSYCHIATRIC: No depression, anxiety, mood swings, or difficulty sleeping  HEME/LYMPH: No easy bruising, or bleeding gums  ALLERGY AND IMMUNOLOGIC: No hives or eczema	      PHYSICAL EXAM:  T(C): 36.5 (01-06-24 @ 08:15), Max: 37.1 (01-05-24 @ 15:44)  HR: 63 (01-06-24 @ 08:15) (50 - 63)  BP: 105/69 (01-06-24 @ 08:15) (102/58 - 124/62)  RR: 18 (01-06-24 @ 08:15) (18 - 18)  SpO2: 94% (01-06-24 @ 08:15) (93% - 98%)  Wt(kg): --  I&O's Summary      Appearance: Normal	  HEENT:   Normal oral mucosa, PERRL, EOMI	  Lymphatic: No lymphadenopathy  Cardiovascular: Normal S1 S2, +click  Respiratory: Lungs clear to auscultation	  Psychiatry: A & O x 3, Mood & affect appropriate  Gastrointestinal:  Soft, Non-tender, + BS	  Skin: No rashes, No ecchymoses, No cyanosis	  Neurologic: Non-focal  Extremities: Normal range of motion, No clubbing, cyanosis or edema  Vascular: Peripheral pulses palpable 2+ bilaterally    MEDICATIONS  (STANDING):  atorvastatin 20 milliGRAM(s) Oral at bedtime  fluticasone propionate 50 MICROgram(s)/spray Nasal Spray 1 Spray(s) Both Nostrils two times a day  guaiFENesin ER 1200 milliGRAM(s) Oral every 12 hours  heparin  Infusion. 1000 Unit(s)/Hr (10 mL/Hr) IV Continuous <Continuous>  insulin lispro (ADMELOG) corrective regimen sliding scale   SubCutaneous Before meals and at bedtime  magnesium oxide 400 milliGRAM(s) Oral three times a day with meals  metoprolol tartrate 12.5 milliGRAM(s) Oral two times a day  pantoprazole    Tablet 40 milliGRAM(s) Oral before breakfast  polyethylene glycol 3350 17 Gram(s) Oral daily  sacubitril 24 mG/valsartan 26 mG 1 Tablet(s) Oral two times a day  senna 2 Tablet(s) Oral at bedtime  warfarin 7 milliGRAM(s) Oral at bedtime      	  	  LABS:	 	                     14.6   8.29  )-----------( 247      ( 06 Jan 2024 06:39 )             43.5     01-06    135  |  104  |  18  ----------------------------<  96  4.1   |  27  |  1.15    Ca    9.2      06 Jan 2024 06:39  Mg     1.5     01-06      proBNP:   Lipid Profile: Cholesterol 132  LDL --  HDL 47    Ldl calc 59  Ratio --    PT/INR - ( 06 Jan 2024 06:39 )   PT: 22.2 sec;   INR: 1.99 ratio         PTT - ( 06 Jan 2024 06:39 )  PTT:56.9 sec

## 2024-01-06 NOTE — PROGRESS NOTE ADULT - PROBLEM SELECTOR PLAN 1
Tele monitoring  Cardio follow up  neuro follow up  AICD interrogation Q3 months  stress test negative

## 2024-01-06 NOTE — PROGRESS NOTE ADULT - PROBLEM SELECTOR PLAN 2
Tele monitoring  Cardio follow up  Echo noted  ACS protocol  Stress test neg for ischemia DC Tele monitoring  Cardio follow up  Echo noted  ACS protocol  Stress test neg for ischemia

## 2024-01-06 NOTE — PROGRESS NOTE ADULT - SUBJECTIVE AND OBJECTIVE BOX
Patient is a 78y old  Male who presents with a chief complaint of syncope (05 Jan 2024 12:56)    pt seen in tele [ x ], reg med floor [   ], bed [ x ], chair at bedside [   ], a+ o x3 [ x ],   lethargic [  ],  nad [ x ]      Allergies    No Known Allergies        Vitals    T(F): 98.1 (01-06-24 @ 05:39), Max: 98.8 (01-05-24 @ 15:44)  HR: 63 (01-06-24 @ 05:39) (50 - 69)  BP: 109/74 (01-06-24 @ 05:39) (102/58 - 124/62)  RR: 18 (01-06-24 @ 05:39) (18 - 19)  SpO2: 98% (01-06-24 @ 05:39) (93% - 98%)  Wt(kg): --  CAPILLARY BLOOD GLUCOSE      POCT Blood Glucose.: 109 mg/dL (05 Jan 2024 20:55)      Labs                          14.0   9.36  )-----------( 246      ( 05 Jan 2024 05:21 )             42.0       01-05    135  |  103  |  20<H>  ----------------------------<  97  3.6   |  28  |  1.26    Ca    9.2      05 Jan 2024 05:21  Mg     1.4     01-04                  Radiology Results      Meds    MEDICATIONS  (STANDING):  atorvastatin 20 milliGRAM(s) Oral at bedtime  fluticasone propionate 50 MICROgram(s)/spray Nasal Spray 1 Spray(s) Both Nostrils two times a day  guaiFENesin ER 1200 milliGRAM(s) Oral every 12 hours  heparin  Infusion. 1000 Unit(s)/Hr (10 mL/Hr) IV Continuous <Continuous>  insulin lispro (ADMELOG) corrective regimen sliding scale   SubCutaneous Before meals and at bedtime  metoprolol tartrate 12.5 milliGRAM(s) Oral two times a day  pantoprazole    Tablet 40 milliGRAM(s) Oral before breakfast  polyethylene glycol 3350 17 Gram(s) Oral daily  sacubitril 24 mG/valsartan 26 mG 1 Tablet(s) Oral two times a day  senna 2 Tablet(s) Oral at bedtime      MEDICATIONS  (PRN):  acetaminophen     Tablet .. 650 milliGRAM(s) Oral every 6 hours PRN Temp greater or equal to 38C (100.4F), Mild Pain (1 - 3)  albuterol/ipratropium for Nebulization 3 milliLiter(s) Nebulizer every 6 hours PRN Shortness of Breath and/or Wheezing  ondansetron Injectable 4 milliGRAM(s) IV Push every 8 hours PRN Nausea and/or Vomiting  oxyCODONE    IR 5 milliGRAM(s) Oral every 6 hours PRN Moderate Pain (4 - 6)      Physical Exam    Neuro :  no focal deficits  Respiratory: CTA B/L  CV: RRR, S1S2, no murmurs,   Abdominal: Soft, NT, ND +BS,  Extremities: No edema, + peripheral pulses    ASSESSMENT    syncope pos 2nd to severe cough,   cns patho r/o,   troponinemia,   r/o acs,   right rib pain 2nd to fx,   s/p fall,   sinusitis,   cindy   h/o htn,   chf,   ppm,   dm,   PAF   aortic valve replacement (? mechanical)      PLAN    d/c tele,   statin,   neuro cons noted   Syncope in patient with dizziness and +jose orthostatics cw orthostatic syncope.  orthostatic bp q shift   trop x 4 positive noted above   cont coumadin to 7mg qhs  inr subtherapeutic noted above   goal inr 2.5 to 3.5   cont hep drip as per hospital normogram  cardio f/u   cont Entresto   AICD interrogated with Episodes: Multiple episodes of NSVT and PMT at rate of 110bmp.  No events on the day of admission. noChanges made: Normal device function.  cont statin, lopressor.  echo with the left ventricular systolic function appears grossly normal.   There is mild (grade 1) left ventricular diastolic dysfunction noted    stress test neg noted above    pulm f/u    robitussin, albuterol,   flonase nasal spray,   completed course of levaquin,   serum potassium wnl   lispro ss,   hgba1c 6.2 noted    phys tx eval noted and rec No skilled PT needs  cont current meds   d/c plan pending goal inr        Patient is a 78y old  Male who presents with a chief complaint of syncope (05 Jan 2024 12:56)    pt seen in tele [ x ], reg med floor [   ], bed [ x ], chair at bedside [   ], a+ o x3 [ x ],   lethargic [  ],  nad [ x ]      Allergies    No Known Allergies        Vitals    T(F): 98.1 (01-06-24 @ 05:39), Max: 98.8 (01-05-24 @ 15:44)  HR: 63 (01-06-24 @ 05:39) (50 - 69)  BP: 109/74 (01-06-24 @ 05:39) (102/58 - 124/62)  RR: 18 (01-06-24 @ 05:39) (18 - 19)  SpO2: 98% (01-06-24 @ 05:39) (93% - 98%)  Wt(kg): --  CAPILLARY BLOOD GLUCOSE      POCT Blood Glucose.: 109 mg/dL (05 Jan 2024 20:55)      Labs                          14.0   9.36  )-----------( 246      ( 05 Jan 2024 05:21 )             42.0       01-05    135  |  103  |  20<H>  ----------------------------<  97  3.6   |  28  |  1.26    Ca    9.2      05 Jan 2024 05:21  Mg     1.4     01-04                  Radiology Results      Meds    MEDICATIONS  (STANDING):  atorvastatin 20 milliGRAM(s) Oral at bedtime  fluticasone propionate 50 MICROgram(s)/spray Nasal Spray 1 Spray(s) Both Nostrils two times a day  guaiFENesin ER 1200 milliGRAM(s) Oral every 12 hours  heparin  Infusion. 1000 Unit(s)/Hr (10 mL/Hr) IV Continuous <Continuous>  insulin lispro (ADMELOG) corrective regimen sliding scale   SubCutaneous Before meals and at bedtime  metoprolol tartrate 12.5 milliGRAM(s) Oral two times a day  pantoprazole    Tablet 40 milliGRAM(s) Oral before breakfast  polyethylene glycol 3350 17 Gram(s) Oral daily  sacubitril 24 mG/valsartan 26 mG 1 Tablet(s) Oral two times a day  senna 2 Tablet(s) Oral at bedtime      MEDICATIONS  (PRN):  acetaminophen     Tablet .. 650 milliGRAM(s) Oral every 6 hours PRN Temp greater or equal to 38C (100.4F), Mild Pain (1 - 3)  albuterol/ipratropium for Nebulization 3 milliLiter(s) Nebulizer every 6 hours PRN Shortness of Breath and/or Wheezing  ondansetron Injectable 4 milliGRAM(s) IV Push every 8 hours PRN Nausea and/or Vomiting  oxyCODONE    IR 5 milliGRAM(s) Oral every 6 hours PRN Moderate Pain (4 - 6)      Physical Exam    Neuro :  no focal deficits  Respiratory: CTA B/L  CV: RRR, S1S2, no murmurs,   Abdominal: Soft, NT, ND +BS,  Extremities: No edema, + peripheral pulses    ASSESSMENT    syncope pos 2nd to severe cough,   cns patho r/o,   troponinemia,   r/o acs,   right rib pain 2nd to fx,   s/p fall,   sinusitis,   cindy   h/o htn,   chf,   ppm,   dm,   PAF   aortic valve replacement (? mechanical)      PLAN    d/c tele,   statin,   neuro cons noted   Syncope in patient with dizziness and +jose orthostatics cw orthostatic syncope.  orthostatic bp q shift   trop x 4 positive noted above   cont coumadin 8 mg qhs  inr subtherapeutic noted     goal inr 2.5 to 3.5   cont hep drip as per hospital normogram  cardio f/u   cont Entresto   AICD interrogated with Episodes: Multiple episodes of NSVT and PMT at rate of 110bmp.  No events on the day of admission. noChanges made: Normal device function.  cont statin, lopressor.  echo with the left ventricular systolic function appears grossly normal.   There is mild (grade 1) left ventricular diastolic dysfunction noted    stress test neg noted      pulm f/u    robitussin, albuterol,   flonase nasal spray,   completed course of levaquin,   serum potassium wnl   lispro ss,   hgba1c 6.2 noted    phys tx eval noted and rec No skilled PT needs  cont current meds   d/c plan pending goal inr        Patient is a 78y old  Male who presents with a chief complaint of syncope (05 Jan 2024 12:56)    pt seen in tele [ x ], reg med floor [   ], bed [ x ], chair at bedside [   ], a+ o x3 [ x ],   lethargic [  ],  nad [ x ]      Allergies    No Known Allergies        Vitals    T(F): 98.1 (01-06-24 @ 05:39), Max: 98.8 (01-05-24 @ 15:44)  HR: 63 (01-06-24 @ 05:39) (50 - 69)  BP: 109/74 (01-06-24 @ 05:39) (102/58 - 124/62)  RR: 18 (01-06-24 @ 05:39) (18 - 19)  SpO2: 98% (01-06-24 @ 05:39) (93% - 98%)  Wt(kg): --  CAPILLARY BLOOD GLUCOSE      POCT Blood Glucose.: 109 mg/dL (05 Jan 2024 20:55)      Labs                          14.0   9.36  )-----------( 246      ( 05 Jan 2024 05:21 )             42.0       01-05    135  |  103  |  20<H>  ----------------------------<  97  3.6   |  28  |  1.26    Ca    9.2      05 Jan 2024 05:21  Mg     1.4     01-04                  Radiology Results      Meds    MEDICATIONS  (STANDING):  atorvastatin 20 milliGRAM(s) Oral at bedtime  fluticasone propionate 50 MICROgram(s)/spray Nasal Spray 1 Spray(s) Both Nostrils two times a day  guaiFENesin ER 1200 milliGRAM(s) Oral every 12 hours  heparin  Infusion. 1000 Unit(s)/Hr (10 mL/Hr) IV Continuous <Continuous>  insulin lispro (ADMELOG) corrective regimen sliding scale   SubCutaneous Before meals and at bedtime  metoprolol tartrate 12.5 milliGRAM(s) Oral two times a day  pantoprazole    Tablet 40 milliGRAM(s) Oral before breakfast  polyethylene glycol 3350 17 Gram(s) Oral daily  sacubitril 24 mG/valsartan 26 mG 1 Tablet(s) Oral two times a day  senna 2 Tablet(s) Oral at bedtime      MEDICATIONS  (PRN):  acetaminophen     Tablet .. 650 milliGRAM(s) Oral every 6 hours PRN Temp greater or equal to 38C (100.4F), Mild Pain (1 - 3)  albuterol/ipratropium for Nebulization 3 milliLiter(s) Nebulizer every 6 hours PRN Shortness of Breath and/or Wheezing  ondansetron Injectable 4 milliGRAM(s) IV Push every 8 hours PRN Nausea and/or Vomiting  oxyCODONE    IR 5 milliGRAM(s) Oral every 6 hours PRN Moderate Pain (4 - 6)      Physical Exam    Neuro :  no focal deficits  Respiratory: CTA B/L  CV: RRR, S1S2, no murmurs,   Abdominal: Soft, NT, ND +BS,  Extremities: No edema, + peripheral pulses    ASSESSMENT    syncope pos 2nd to severe cough,   cns patho r/o,   troponinemia,   r/o acs,   right rib pain 2nd to fx,   s/p fall,   sinusitis,   cindy   h/o htn,   chf,   ppm,   dm,   PAF   aortic valve replacement (? mechanical)      PLAN    d/c tele,   statin,   neuro cons noted   Syncope in patient with dizziness and +jose orthostatics cw orthostatic syncope.  orthostatic bp q shift   trop x 4 positive noted above   cont coumadin 7 mg qhs  inr subtherapeutic noted     goal inr 2.5 to 3.5   cont hep drip as per hospital normogram  cardio f/u   cont Entresto   AICD interrogated with Episodes: Multiple episodes of NSVT and PMT at rate of 110bmp.  No events on the day of admission. noChanges made: Normal device function.  cont statin, lopressor.  echo with the left ventricular systolic function appears grossly normal.   There is mild (grade 1) left ventricular diastolic dysfunction noted    stress test neg noted      pulm f/u    robitussin, albuterol,   flonase nasal spray,   completed course of levaquin,   serum potassium wnl   lispro ss,   hgba1c 6.2 noted    phys tx eval noted and rec No skilled PT needs  cont current meds   d/c plan pending goal inr

## 2024-01-06 NOTE — PROGRESS NOTE ADULT - ASSESSMENT
77 y/o M with PMH of DM, HTN, HLD, PAF, CHF, S/P defibrillator,S/P CABG and AVR  who presented after an episode of syncope,acute lt sided rib fx,sinusitis,BEVERLY.  1.Interrogation of  AICD q3mo.  2.PAF,mech AVR-coumadin and heparin drip until INR >2.5.  3.Borderline troponins-Stress test -no ischemia.  4.CHF by history-b blocker.Entresto.  5.CAD-statin,lopressor.   79 y/o M with PMH of DM, HTN, HLD, PAF, CHF, S/P defibrillator,S/P CABG and AVR  who presented after an episode of syncope,acute lt sided rib fx,sinusitis,BEVERLY.  1.Interrogation of  AICD q3mo.  2.PAF,mech AVR-coumadin and heparin drip until INR >2.5.  3.Borderline troponins-Stress test -no ischemia.  4.CHF by history-b blocker.Entresto.  5.CAD-statin,lopressor.

## 2024-01-06 NOTE — PROGRESS NOTE ADULT - SUBJECTIVE AND OBJECTIVE BOX
Patient is a 78y old  Male who presents with a chief complaint of Syncope (06 Jan 2024 10:20)  Awake, alert and comfortable lying in bed in NAD.    INTERVAL HPI/OVERNIGHT EVENTS:      VITAL SIGNS:  T(F): 97.7 (01-06-24 @ 08:15)  HR: 63 (01-06-24 @ 08:15)  BP: 105/69 (01-06-24 @ 08:15)  RR: 18 (01-06-24 @ 08:15)  SpO2: 94% (01-06-24 @ 08:15)  Wt(kg): --  I&O's Detail          REVIEW OF SYSTEMS:    CONSTITUTIONAL:  No fevers, chills, sweats    HEENT:  Eyes:  No diplopia or blurred vision. ENT:  No earache, sore throat or runny nose.    CARDIOVASCULAR:  No pressure, squeezing, tightness, or heaviness about the chest; no palpitations.    RESPIRATORY:  Per HPI    GASTROINTESTINAL:  No abdominal pain, nausea, vomiting or diarrhea.    GENITOURINARY:  No dysuria, frequency or urgency.    NEUROLOGIC:  No paresthesias, fasciculations, seizures or weakness.    PSYCHIATRIC:  No disorder of thought or mood.      PHYSICAL EXAM:    Constitutional: Well developed and nourished  Eyes:Perrla  ENMT: normal  Neck:supple  Respiratory: good air entry  Cardiovascular: S1 S2 regular  Gastrointestinal: Soft, Non tender  Extremities: No edema  Vascular:normal  Neurological:Awake, alert,Ox3  Musculoskeletal:Normal      MEDICATIONS  (STANDING):  atorvastatin 20 milliGRAM(s) Oral at bedtime  fluticasone propionate 50 MICROgram(s)/spray Nasal Spray 1 Spray(s) Both Nostrils two times a day  guaiFENesin ER 1200 milliGRAM(s) Oral every 12 hours  heparin  Infusion. 1000 Unit(s)/Hr (10 mL/Hr) IV Continuous <Continuous>  insulin lispro (ADMELOG) corrective regimen sliding scale   SubCutaneous Before meals and at bedtime  magnesium oxide 400 milliGRAM(s) Oral three times a day with meals  metoprolol tartrate 12.5 milliGRAM(s) Oral two times a day  pantoprazole    Tablet 40 milliGRAM(s) Oral before breakfast  polyethylene glycol 3350 17 Gram(s) Oral daily  sacubitril 24 mG/valsartan 26 mG 1 Tablet(s) Oral two times a day  senna 2 Tablet(s) Oral at bedtime  warfarin 7 milliGRAM(s) Oral at bedtime    MEDICATIONS  (PRN):  acetaminophen     Tablet .. 650 milliGRAM(s) Oral every 6 hours PRN Temp greater or equal to 38C (100.4F), Mild Pain (1 - 3)  albuterol/ipratropium for Nebulization 3 milliLiter(s) Nebulizer every 6 hours PRN Shortness of Breath and/or Wheezing  ondansetron Injectable 4 milliGRAM(s) IV Push every 8 hours PRN Nausea and/or Vomiting  oxyCODONE    IR 5 milliGRAM(s) Oral every 6 hours PRN Moderate Pain (4 - 6)      Allergies    No Known Allergies    Intolerances        LABS:                        14.6   8.29  )-----------( 247      ( 06 Jan 2024 06:39 )             43.5     01-06    135  |  104  |  18  ----------------------------<  96  4.1   |  27  |  1.15    Ca    9.2      06 Jan 2024 06:39  Mg     1.5     01-06      PT/INR - ( 06 Jan 2024 06:39 )   PT: 22.2 sec;   INR: 1.99 ratio         PTT - ( 06 Jan 2024 06:39 )  PTT:56.9 sec  Urinalysis Basic - ( 06 Jan 2024 06:39 )    Color: x / Appearance: x / SG: x / pH: x  Gluc: 96 mg/dL / Ketone: x  / Bili: x / Urobili: x   Blood: x / Protein: x / Nitrite: x   Leuk Esterase: x / RBC: x / WBC x   Sq Epi: x / Non Sq Epi: x / Bacteria: x            CAPILLARY BLOOD GLUCOSE      POCT Blood Glucose.: 91 mg/dL (06 Jan 2024 07:46)  POCT Blood Glucose.: 109 mg/dL (05 Jan 2024 20:55)  POCT Blood Glucose.: 141 mg/dL (05 Jan 2024 16:30)  POCT Blood Glucose.: 85 mg/dL (05 Jan 2024 11:30)        RADIOLOGY & ADDITIONAL TESTS:    CXR:    Ct scan chest:    ekg;    echo: Patient is a 78y old  Male who presents with a chief complaint of Syncope (06 Jan 2024 10:20)  Awake, alert and comfortable lying in bed in NAD. Awaiting for therapeutic INR    INTERVAL HPI/OVERNIGHT EVENTS:      VITAL SIGNS:  T(F): 97.7 (01-06-24 @ 08:15)  HR: 63 (01-06-24 @ 08:15)  BP: 105/69 (01-06-24 @ 08:15)  RR: 18 (01-06-24 @ 08:15)  SpO2: 94% (01-06-24 @ 08:15)  Wt(kg): --  I&O's Detail          REVIEW OF SYSTEMS:    CONSTITUTIONAL:  No fevers, chills, sweats    HEENT:  Eyes:  No diplopia or blurred vision. ENT:  No earache, sore throat or runny nose.    CARDIOVASCULAR:  No pressure, squeezing, tightness, or heaviness about the chest; no palpitations.    RESPIRATORY:  Per HPI    GASTROINTESTINAL:  No abdominal pain, nausea, vomiting or diarrhea.    GENITOURINARY:  No dysuria, frequency or urgency.    NEUROLOGIC:  No paresthesias, fasciculations, seizures or weakness.    PSYCHIATRIC:  No disorder of thought or mood.      PHYSICAL EXAM:    Constitutional: Well developed and nourished  Eyes:Perrla  ENMT: normal  Neck:supple  Respiratory: good air entry  Cardiovascular: S1 S2 regular  Gastrointestinal: Soft, Non tender  Extremities: No edema  Vascular:normal  Neurological:Awake, alert,Ox3  Musculoskeletal:Normal      MEDICATIONS  (STANDING):  atorvastatin 20 milliGRAM(s) Oral at bedtime  fluticasone propionate 50 MICROgram(s)/spray Nasal Spray 1 Spray(s) Both Nostrils two times a day  guaiFENesin ER 1200 milliGRAM(s) Oral every 12 hours  heparin  Infusion. 1000 Unit(s)/Hr (10 mL/Hr) IV Continuous <Continuous>  insulin lispro (ADMELOG) corrective regimen sliding scale   SubCutaneous Before meals and at bedtime  magnesium oxide 400 milliGRAM(s) Oral three times a day with meals  metoprolol tartrate 12.5 milliGRAM(s) Oral two times a day  pantoprazole    Tablet 40 milliGRAM(s) Oral before breakfast  polyethylene glycol 3350 17 Gram(s) Oral daily  sacubitril 24 mG/valsartan 26 mG 1 Tablet(s) Oral two times a day  senna 2 Tablet(s) Oral at bedtime  warfarin 7 milliGRAM(s) Oral at bedtime    MEDICATIONS  (PRN):  acetaminophen     Tablet .. 650 milliGRAM(s) Oral every 6 hours PRN Temp greater or equal to 38C (100.4F), Mild Pain (1 - 3)  albuterol/ipratropium for Nebulization 3 milliLiter(s) Nebulizer every 6 hours PRN Shortness of Breath and/or Wheezing  ondansetron Injectable 4 milliGRAM(s) IV Push every 8 hours PRN Nausea and/or Vomiting  oxyCODONE    IR 5 milliGRAM(s) Oral every 6 hours PRN Moderate Pain (4 - 6)      Allergies    No Known Allergies    Intolerances        LABS:                        14.6   8.29  )-----------( 247      ( 06 Jan 2024 06:39 )             43.5     01-06    135  |  104  |  18  ----------------------------<  96  4.1   |  27  |  1.15    Ca    9.2      06 Jan 2024 06:39  Mg     1.5     01-06      PT/INR - ( 06 Jan 2024 06:39 )   PT: 22.2 sec;   INR: 1.99 ratio         PTT - ( 06 Jan 2024 06:39 )  PTT:56.9 sec  Urinalysis Basic - ( 06 Jan 2024 06:39 )    Color: x / Appearance: x / SG: x / pH: x  Gluc: 96 mg/dL / Ketone: x  / Bili: x / Urobili: x   Blood: x / Protein: x / Nitrite: x   Leuk Esterase: x / RBC: x / WBC x   Sq Epi: x / Non Sq Epi: x / Bacteria: x            CAPILLARY BLOOD GLUCOSE      POCT Blood Glucose.: 91 mg/dL (06 Jan 2024 07:46)  POCT Blood Glucose.: 109 mg/dL (05 Jan 2024 20:55)  POCT Blood Glucose.: 141 mg/dL (05 Jan 2024 16:30)  POCT Blood Glucose.: 85 mg/dL (05 Jan 2024 11:30)        RADIOLOGY & ADDITIONAL TESTS:    CXR:    Ct scan chest:    ekg;    echo:

## 2024-01-07 LAB
ANION GAP SERPL CALC-SCNC: 4 MMOL/L — LOW (ref 5–17)
ANION GAP SERPL CALC-SCNC: 4 MMOL/L — LOW (ref 5–17)
APTT BLD: 126.9 SEC — CRITICAL HIGH (ref 24.5–35.6)
APTT BLD: 126.9 SEC — CRITICAL HIGH (ref 24.5–35.6)
APTT BLD: 53.1 SEC — HIGH (ref 24.5–35.6)
APTT BLD: 53.1 SEC — HIGH (ref 24.5–35.6)
APTT BLD: 58.2 SEC — HIGH (ref 24.5–35.6)
APTT BLD: 58.2 SEC — HIGH (ref 24.5–35.6)
APTT BLD: 82.4 SEC — HIGH (ref 24.5–35.6)
APTT BLD: 82.4 SEC — HIGH (ref 24.5–35.6)
BUN SERPL-MCNC: 21 MG/DL — HIGH (ref 7–18)
BUN SERPL-MCNC: 21 MG/DL — HIGH (ref 7–18)
CALCIUM SERPL-MCNC: 9.3 MG/DL — SIGNIFICANT CHANGE UP (ref 8.4–10.5)
CALCIUM SERPL-MCNC: 9.3 MG/DL — SIGNIFICANT CHANGE UP (ref 8.4–10.5)
CHLORIDE SERPL-SCNC: 104 MMOL/L — SIGNIFICANT CHANGE UP (ref 96–108)
CHLORIDE SERPL-SCNC: 104 MMOL/L — SIGNIFICANT CHANGE UP (ref 96–108)
CO2 SERPL-SCNC: 27 MMOL/L — SIGNIFICANT CHANGE UP (ref 22–31)
CO2 SERPL-SCNC: 27 MMOL/L — SIGNIFICANT CHANGE UP (ref 22–31)
CREAT SERPL-MCNC: 1.14 MG/DL — SIGNIFICANT CHANGE UP (ref 0.5–1.3)
CREAT SERPL-MCNC: 1.14 MG/DL — SIGNIFICANT CHANGE UP (ref 0.5–1.3)
EGFR: 67 ML/MIN/1.73M2 — SIGNIFICANT CHANGE UP
EGFR: 67 ML/MIN/1.73M2 — SIGNIFICANT CHANGE UP
GLUCOSE BLDC GLUCOMTR-MCNC: 118 MG/DL — HIGH (ref 70–99)
GLUCOSE BLDC GLUCOMTR-MCNC: 118 MG/DL — HIGH (ref 70–99)
GLUCOSE BLDC GLUCOMTR-MCNC: 125 MG/DL — HIGH (ref 70–99)
GLUCOSE BLDC GLUCOMTR-MCNC: 125 MG/DL — HIGH (ref 70–99)
GLUCOSE BLDC GLUCOMTR-MCNC: 89 MG/DL — SIGNIFICANT CHANGE UP (ref 70–99)
GLUCOSE BLDC GLUCOMTR-MCNC: 89 MG/DL — SIGNIFICANT CHANGE UP (ref 70–99)
GLUCOSE BLDC GLUCOMTR-MCNC: 91 MG/DL — SIGNIFICANT CHANGE UP (ref 70–99)
GLUCOSE BLDC GLUCOMTR-MCNC: 91 MG/DL — SIGNIFICANT CHANGE UP (ref 70–99)
GLUCOSE SERPL-MCNC: 97 MG/DL — SIGNIFICANT CHANGE UP (ref 70–99)
GLUCOSE SERPL-MCNC: 97 MG/DL — SIGNIFICANT CHANGE UP (ref 70–99)
HCT VFR BLD CALC: 42.6 % — SIGNIFICANT CHANGE UP (ref 39–50)
HCT VFR BLD CALC: 42.6 % — SIGNIFICANT CHANGE UP (ref 39–50)
HGB BLD-MCNC: 14.2 G/DL — SIGNIFICANT CHANGE UP (ref 13–17)
HGB BLD-MCNC: 14.2 G/DL — SIGNIFICANT CHANGE UP (ref 13–17)
INR BLD: 2 RATIO — HIGH (ref 0.85–1.18)
INR BLD: 2 RATIO — HIGH (ref 0.85–1.18)
MAGNESIUM SERPL-MCNC: 1.6 MG/DL — SIGNIFICANT CHANGE UP (ref 1.6–2.6)
MAGNESIUM SERPL-MCNC: 1.6 MG/DL — SIGNIFICANT CHANGE UP (ref 1.6–2.6)
MCHC RBC-ENTMCNC: 30.7 PG — SIGNIFICANT CHANGE UP (ref 27–34)
MCHC RBC-ENTMCNC: 30.7 PG — SIGNIFICANT CHANGE UP (ref 27–34)
MCHC RBC-ENTMCNC: 33.3 GM/DL — SIGNIFICANT CHANGE UP (ref 32–36)
MCHC RBC-ENTMCNC: 33.3 GM/DL — SIGNIFICANT CHANGE UP (ref 32–36)
MCV RBC AUTO: 92.2 FL — SIGNIFICANT CHANGE UP (ref 80–100)
MCV RBC AUTO: 92.2 FL — SIGNIFICANT CHANGE UP (ref 80–100)
NRBC # BLD: 0 /100 WBCS — SIGNIFICANT CHANGE UP (ref 0–0)
NRBC # BLD: 0 /100 WBCS — SIGNIFICANT CHANGE UP (ref 0–0)
PLATELET # BLD AUTO: 269 K/UL — SIGNIFICANT CHANGE UP (ref 150–400)
PLATELET # BLD AUTO: 269 K/UL — SIGNIFICANT CHANGE UP (ref 150–400)
POTASSIUM SERPL-MCNC: 4.1 MMOL/L — SIGNIFICANT CHANGE UP (ref 3.5–5.3)
POTASSIUM SERPL-MCNC: 4.1 MMOL/L — SIGNIFICANT CHANGE UP (ref 3.5–5.3)
POTASSIUM SERPL-SCNC: 4.1 MMOL/L — SIGNIFICANT CHANGE UP (ref 3.5–5.3)
POTASSIUM SERPL-SCNC: 4.1 MMOL/L — SIGNIFICANT CHANGE UP (ref 3.5–5.3)
PROTHROM AB SERPL-ACNC: 22.3 SEC — HIGH (ref 9.5–13)
PROTHROM AB SERPL-ACNC: 22.3 SEC — HIGH (ref 9.5–13)
RBC # BLD: 4.62 M/UL — SIGNIFICANT CHANGE UP (ref 4.2–5.8)
RBC # BLD: 4.62 M/UL — SIGNIFICANT CHANGE UP (ref 4.2–5.8)
RBC # FLD: 13.9 % — SIGNIFICANT CHANGE UP (ref 10.3–14.5)
RBC # FLD: 13.9 % — SIGNIFICANT CHANGE UP (ref 10.3–14.5)
SODIUM SERPL-SCNC: 135 MMOL/L — SIGNIFICANT CHANGE UP (ref 135–145)
SODIUM SERPL-SCNC: 135 MMOL/L — SIGNIFICANT CHANGE UP (ref 135–145)
WBC # BLD: 8.74 K/UL — SIGNIFICANT CHANGE UP (ref 3.8–10.5)
WBC # BLD: 8.74 K/UL — SIGNIFICANT CHANGE UP (ref 3.8–10.5)
WBC # FLD AUTO: 8.74 K/UL — SIGNIFICANT CHANGE UP (ref 3.8–10.5)
WBC # FLD AUTO: 8.74 K/UL — SIGNIFICANT CHANGE UP (ref 3.8–10.5)

## 2024-01-07 RX ORDER — WARFARIN SODIUM 2.5 MG/1
7.5 TABLET ORAL AT BEDTIME
Refills: 0 | Status: DISCONTINUED | OUTPATIENT
Start: 2024-01-07 | End: 2024-01-08

## 2024-01-07 RX ADMIN — Medication 1200 MILLIGRAM(S): at 17:32

## 2024-01-07 RX ADMIN — PANTOPRAZOLE SODIUM 40 MILLIGRAM(S): 20 TABLET, DELAYED RELEASE ORAL at 06:27

## 2024-01-07 RX ADMIN — HEPARIN SODIUM 600 UNIT(S)/HR: 5000 INJECTION INTRAVENOUS; SUBCUTANEOUS at 18:51

## 2024-01-07 RX ADMIN — Medication 1200 MILLIGRAM(S): at 06:27

## 2024-01-07 RX ADMIN — HEPARIN SODIUM 600 UNIT(S)/HR: 5000 INJECTION INTRAVENOUS; SUBCUTANEOUS at 19:44

## 2024-01-07 RX ADMIN — Medication 1 SPRAY(S): at 17:53

## 2024-01-07 RX ADMIN — HEPARIN SODIUM 600 UNIT(S)/HR: 5000 INJECTION INTRAVENOUS; SUBCUTANEOUS at 03:56

## 2024-01-07 RX ADMIN — WARFARIN SODIUM 7.5 MILLIGRAM(S): 2.5 TABLET ORAL at 22:43

## 2024-01-07 RX ADMIN — HEPARIN SODIUM 600 UNIT(S)/HR: 5000 INJECTION INTRAVENOUS; SUBCUTANEOUS at 07:14

## 2024-01-07 RX ADMIN — SACUBITRIL AND VALSARTAN 1 TABLET(S): 24; 26 TABLET, FILM COATED ORAL at 17:33

## 2024-01-07 RX ADMIN — HEPARIN SODIUM 600 UNIT(S)/HR: 5000 INJECTION INTRAVENOUS; SUBCUTANEOUS at 07:54

## 2024-01-07 RX ADMIN — SENNA PLUS 2 TABLET(S): 8.6 TABLET ORAL at 22:38

## 2024-01-07 RX ADMIN — Medication 12.5 MILLIGRAM(S): at 17:33

## 2024-01-07 RX ADMIN — HEPARIN SODIUM 800 UNIT(S)/HR: 5000 INJECTION INTRAVENOUS; SUBCUTANEOUS at 11:47

## 2024-01-07 RX ADMIN — Medication 1 SPRAY(S): at 06:26

## 2024-01-07 RX ADMIN — ATORVASTATIN CALCIUM 20 MILLIGRAM(S): 80 TABLET, FILM COATED ORAL at 22:38

## 2024-01-07 RX ADMIN — SACUBITRIL AND VALSARTAN 1 TABLET(S): 24; 26 TABLET, FILM COATED ORAL at 06:27

## 2024-01-07 RX ADMIN — Medication 12.5 MILLIGRAM(S): at 06:27

## 2024-01-07 NOTE — PROGRESS NOTE ADULT - PROBLEM SELECTOR PLAN 10
On warfarin.
pain control  incentive spirometry
Controlled  Continue metoprolol, and sacubitril
C/w atorvastatin.   lipid profile wnl.
pain control  incentive spirometry
pain control  incentive spirometry
C/w atorvastatin.   lipid profile wnl.
pain control  incentive spirometry

## 2024-01-07 NOTE — PROGRESS NOTE ADULT - PROBLEM SELECTOR PLAN 7
monitor BP  cont meds
On Farxiga and metformin at home.   Hold oral antihypoglycemic.  c/w sliding scale.   a1c 6.2%  controlled
monitor BP  cont meds
On Farxiga and metformin at home.   Hold oral antihypoglycemic.  c/w sliding scale.   a1c 6.2%  controlled
monitor BP  cont meds
No chest pain.   Trop 368>406>377   EKG - AV dual paced rhythm.   echo noted  stress test done  Cards Tejal.
On Farxiga and metformin at home.   Hold oral antihypoglycemic.  Start sliding scale.   F/U A1C
monitor BP  cont meds
monitor BP  cont meds

## 2024-01-07 NOTE — PROGRESS NOTE ADULT - SUBJECTIVE AND OBJECTIVE BOX
Date of Service 01-07-24 @ 11:44    CHIEF COMPLAINT:Patient is a 78y old  Male who presents with a chief complaint of Syncope,rib fx.Pt appears comfortable.    	  REVIEW OF SYSTEMS:  CONSTITUTIONAL: No fever, weight loss, or fatigue  EYES: No eye pain, visual disturbances, or discharge  ENT:  No difficulty hearing, tinnitus, vertigo; No sinus or throat pain  NECK: No pain or stiffness  RESPIRATORY: No cough, wheezing, chills or hemoptysis; No Shortness of Breath  CARDIOVASCULAR: No chest pain, palpitations, passing out, dizziness, or leg swelling  GASTROINTESTINAL: No abdominal or epigastric pain. No nausea, vomiting, or hematemesis; No diarrhea or constipation. No melena or hematochezia.  GENITOURINARY: No dysuria, frequency, hematuria, or incontinence  NEUROLOGICAL: No headaches, memory loss, loss of strength, numbness, or tremors  SKIN: No itching, burning, rashes, or lesions   LYMPH Nodes: No enlarged glands  ENDOCRINE: No heat or cold intolerance; No hair loss  MUSCULOSKELETAL: No joint pain or swelling; No muscle, back, or extremity pain  PSYCHIATRIC: No depression, anxiety, mood swings, or difficulty sleeping  HEME/LYMPH: No easy bruising, or bleeding gums  ALLERGY AND IMMUNOLOGIC: No hives or eczema	        PHYSICAL EXAM:  T(C): 36.7 (01-07-24 @ 05:30), Max: 36.9 (01-06-24 @ 20:30)  HR: 60 (01-07-24 @ 05:30) (60 - 64)  BP: 117/66 (01-07-24 @ 05:30) (117/66 - 147/73)  RR: 18 (01-07-24 @ 05:30) (18 - 18)  SpO2: 93% (01-07-24 @ 05:30) (93% - 95%)  Wt(kg): --  I&O's Summary      Appearance: Normal	  HEENT:   Normal oral mucosa, PERRL, EOMI	  Lymphatic: No lymphadenopathy  Cardiovascular: Normal S1 S2, +click  Respiratory: Lungs clear to auscultation	  Psychiatry: A & O x 3, Mood & affect appropriate  Gastrointestinal:  Soft, Non-tender, + BS	  Skin: No rashes, No ecchymoses, No cyanosis	  Neurologic: Non-focal  Extremities: Normal range of motion, No clubbing, cyanosis or edema  Vascular: Peripheral pulses palpable 2+ bilaterally    MEDICATIONS  (STANDING):  atorvastatin 20 milliGRAM(s) Oral at bedtime  fluticasone propionate 50 MICROgram(s)/spray Nasal Spray 1 Spray(s) Both Nostrils two times a day  guaiFENesin ER 1200 milliGRAM(s) Oral every 12 hours  heparin  Infusion. 1000 Unit(s)/Hr (10 mL/Hr) IV Continuous <Continuous>  insulin lispro (ADMELOG) corrective regimen sliding scale   SubCutaneous Before meals and at bedtime  metoprolol tartrate 12.5 milliGRAM(s) Oral two times a day  pantoprazole    Tablet 40 milliGRAM(s) Oral before breakfast  polyethylene glycol 3350 17 Gram(s) Oral daily  sacubitril 24 mG/valsartan 26 mG 1 Tablet(s) Oral two times a day  senna 2 Tablet(s) Oral at bedtime  warfarin 7.5 milliGRAM(s) Oral at bedtime      LABS:	 	                         14.2   8.74  )-----------( 269      ( 07 Jan 2024 06:41 )             42.6     01-07    135  |  104  |  21<H>  ----------------------------<  97  4.1   |  27  |  1.14    Ca    9.3      07 Jan 2024 06:41  Mg     1.6     01-07      proBNP:   Lipid Profile: Cholesterol 132  LDL --  HDL 47    Ldl calc 59  Ratio --    PT/INR - ( 07 Jan 2024 06:41 )   PT: 22.3 sec;   INR: 2.00 ratio         PTT - ( 07 Jan 2024 09:48 )  PTT:53.1 sec

## 2024-01-07 NOTE — PROGRESS NOTE ADULT - PROBLEM SELECTOR PROBLEM 10
Rib fracture
Prophylactic measure
Rib fracture
Rib fracture
Hyperlipidemia
Hypertension
Rib fracture
Rib fracture
Hyperlipidemia

## 2024-01-07 NOTE — PROGRESS NOTE ADULT - PROBLEM SELECTOR PROBLEM 9
Chronic CHF
Chronic CHF
Hyperlipidemia
Chronic CHF
Chronic CHF
Diabetes mellitus
Hypertension
Chronic CHF
Chronic CHF
Hypertension
Chronic CHF

## 2024-01-07 NOTE — PROGRESS NOTE ADULT - PROBLEM SELECTOR PLAN 2
DC Tele monitoring  Cardio follow up  Echo noted  ACS protocol completed  Stress test neg for ischemia

## 2024-01-07 NOTE — DIETITIAN INITIAL EVALUATION ADULT - PERTINENT MEDS FT
MEDICATIONS  (STANDING):  atorvastatin 20 milliGRAM(s) Oral at bedtime  fluticasone propionate 50 MICROgram(s)/spray Nasal Spray 1 Spray(s) Both Nostrils two times a day  guaiFENesin ER 1200 milliGRAM(s) Oral every 12 hours  heparin  Infusion. 1000 Unit(s)/Hr (10 mL/Hr) IV Continuous <Continuous>  insulin lispro (ADMELOG) corrective regimen sliding scale   SubCutaneous Before meals and at bedtime  metoprolol tartrate 12.5 milliGRAM(s) Oral two times a day  pantoprazole    Tablet 40 milliGRAM(s) Oral before breakfast  polyethylene glycol 3350 17 Gram(s) Oral daily  sacubitril 24 mG/valsartan 26 mG 1 Tablet(s) Oral two times a day  senna 2 Tablet(s) Oral at bedtime  warfarin 7.5 milliGRAM(s) Oral at bedtime    MEDICATIONS  (PRN):  acetaminophen     Tablet .. 650 milliGRAM(s) Oral every 6 hours PRN Temp greater or equal to 38C (100.4F), Mild Pain (1 - 3)  albuterol/ipratropium for Nebulization 3 milliLiter(s) Nebulizer every 6 hours PRN Shortness of Breath and/or Wheezing  ondansetron Injectable 4 milliGRAM(s) IV Push every 8 hours PRN Nausea and/or Vomiting

## 2024-01-07 NOTE — PROGRESS NOTE ADULT - PROBLEM SELECTOR PLAN 8
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 Written HEP initiated  for symptom management  Needs progression for comprehensive program development. [] yes  [x] no   Short term goal 2: Pain-free R shoulder flex, ext, abd and forearm pronation/supination AROM to increase 10-20deg allowing an increase in ADL tolerance. AROM RUE (degrees)  RUE AROM : WFL  R Shoulder Flexion 0-180: 105  R Shoulder Extension 0-45: 40  R Shoulder ABduction 0-180: 75    R Forearm Pron 0-90: 70  R Forearm Supination  0-90: 48 [] yes  [x] no   Short term goal 3: Decrease R wrist pain </=3/10 to assist with improved functional gains during ADL's. Pain Location: Wrist, Shoulder, Elbow    Pain Level: 5 (Increases with activity)    Pain Descriptors: Pins and needles, Sharp [] yes  [x] no     Long Term Goals - Time Frame for Long term goals : 4-6 weeks  Goals Current/ Discharge status Met   Long term goal 1: Pain-free R wrist AROM to increase 10-20 deg allowing an increase in ADL tolerance.  R Wrist Flexion 0-80: 40  R Wrist Extension 0-70: 50  R Wrist Radial
Deviation 0-20: 10  R Wrist Ulnar Deviation 0-45: 20 [] yes  [x] no   Long term goal 2: Improve R mid scap strength 4-/5 to allow patient to improve poture awareness. Strength Other  Other: Able to make a fist however arthritic changes limiting motion. , Mid/low scap R 3+/5 R, 4/5 L   [] yes  [x] no   Long term goal 3: Pt demo improved overall function by reporting greater than 60% per functional survey score Exam: UEFI 31/80=39% functional   [] yes  [x] no   Long term goal 4: Improve R UE strength 4/5 to 4+/5 to allow patient to improve functional tolerance during higher level activity Strength RUE: WFL  R Shoulder ABduction: 3+/5  R Shoulder Internal Rotation: 3+/5, 4-/5  R Shoulder External Rotation: 3+/5, 4-/5  R Elbow Flexion: 4-/5  R Elbow Extension: 4-/5  R Forearm Pron: 4-/5 [] yes  [x] no       Body structures, Functions, Activity limitations: Increased pain, Decreased ADL status, Decreased posture, Decreased ROM, Decreased strength  Assessment: The pt's impairments currently limit functional abilities by 61% including his abilities to  reach and lift, perform recreational actvities, and perform household/work related duties without pain or limitations. Skilled PT required to address above deficits to improve overall function and return to improved level of function. Prognosis: Good  Discharge Recommendations: Continue to assess pending progress      PT Education: Goals;PT Role;Plan of Care;Home Exercise Program  Patient Education: posture and written HEP provided. PLAN: [x] Evaluate and Treat  Frequency/Duration:  Plan  Times per week: 2  Plan weeks: 4-6  Current Treatment Recommendations: Strengthening, ROM, Home Exercise Program, Integrated Dry Needling, Manual Therapy - Soft Tissue Mobilization, Modalities  Plan Comment: Transfer pt care Aníbal Leigh DPT     Precautions:Several (cervical and low back) spine surgeries.                         Patient Status:[x] Continue/ Initiate plan of Care    []
Discharge PT. Recommend pt continue with HEP. [] Additional visits requested, Please re-certify for additional visits:          Signature: Electronically signed by Ghada Montalvo PT on 5/18/21 at 4:06 PM EDT      If you have any questions or concerns, please don't hesitate to call. Thank you for your referral.    I have reviewed this plan of care and certify a need for medically necessary rehabilitation services.     Physician Signature:__________________________________________________________  Date:  Please sign and return
lipid panel  statin
C/w metoprolol.
lipid panel  statin
Resolved.
c/w statin and bb
lipid panel  statin
c/w statin and bb

## 2024-01-07 NOTE — DIETITIAN INITIAL EVALUATION ADULT - NSFNSGIIOFT_GEN_A_CORE
EBQ=956 lb   Wts in [place] EMR reviewed, downward trend with a bit fluctuation, may due to scale/fluid variance UZN=131 lb   Wts in [place] EMR reviewed, downward trend with a bit fluctuation, may due to scale/fluid variance

## 2024-01-07 NOTE — DIETITIAN INITIAL EVALUATION ADULT - OTHER INFO
Pt lives home with family PTA, alert, oriented, Stateless speaking, contacted via [employer]  ID # [id number], well-communicated; Reported appetite good, denied recent wt changes, denied GI distress, chewing or swallowing problem; Unknown food allergies, no specific food choices; h/o DM, MxrK7K=8.2, on finger stick range=85 to 141 x past 3d, not on DM modified diet per MD at present  Pt lives home with family PTA, alert, oriented, Icelandic speaking, contacted via [employer]  ID # [id number], well-communicated; Reported appetite good, denied recent wt changes, denied GI distress, chewing or swallowing problem; Unknown food allergies, no specific food choices; h/o DM, MruT4J=3.2, on finger stick range=85 to 141 x past 3d, not on DM modified diet per MD at present

## 2024-01-07 NOTE — PROGRESS NOTE ADULT - ASSESSMENT
79 y/o M with PMH of DM, HTN, HLD, PAF, CHF, S/P defibrillator,S/P CABG and AVR  who presented after an episode of syncope,acute lt sided rib fx,sinusitis,BEVERLY.  1.Interrogation of  AICD q3mo.  2.PAF,mech AVR-coumadin and heparin drip until INR >2.5.  3.Borderline troponins-Stress test -no ischemia.  4.CHF by history-b blocker.Entresto.  5.CAD-statin,lopressor.

## 2024-01-07 NOTE — PROGRESS NOTE ADULT - SUBJECTIVE AND OBJECTIVE BOX
Patient is a 78y old  Male who presents with a chief complaint of Syncope (06 Jan 2024 10:34)    pt seen in tele [ x ], reg med floor [   ], bed [ x ], chair at bedside [   ], a+ o x3 [ x ],   lethargic [  ],  nad [ x ]        Allergies    No Known Allergies        Vitals    T(F): 98 (01-07-24 @ 05:30), Max: 98.4 (01-06-24 @ 20:30)  HR: 60 (01-07-24 @ 05:30) (59 - 64)  BP: 117/66 (01-07-24 @ 05:30) (99/65 - 147/73)  RR: 18 (01-07-24 @ 05:30) (18 - 18)  SpO2: 93% (01-07-24 @ 05:30) (93% - 95%)  Wt(kg): --  CAPILLARY BLOOD GLUCOSE      POCT Blood Glucose.: 107 mg/dL (06 Jan 2024 21:18)      Labs                          14.6   8.29  )-----------( 247      ( 06 Jan 2024 06:39 )             43.5       01-06    135  |  104  |  18  ----------------------------<  96  4.1   |  27  |  1.15    Ca    9.2      06 Jan 2024 06:39  Mg     1.5     01-06                  Radiology Results      Meds    MEDICATIONS  (STANDING):  atorvastatin 20 milliGRAM(s) Oral at bedtime  fluticasone propionate 50 MICROgram(s)/spray Nasal Spray 1 Spray(s) Both Nostrils two times a day  guaiFENesin ER 1200 milliGRAM(s) Oral every 12 hours  heparin  Infusion. 1000 Unit(s)/Hr (10 mL/Hr) IV Continuous <Continuous>  insulin lispro (ADMELOG) corrective regimen sliding scale   SubCutaneous Before meals and at bedtime  metoprolol tartrate 12.5 milliGRAM(s) Oral two times a day  pantoprazole    Tablet 40 milliGRAM(s) Oral before breakfast  polyethylene glycol 3350 17 Gram(s) Oral daily  sacubitril 24 mG/valsartan 26 mG 1 Tablet(s) Oral two times a day  senna 2 Tablet(s) Oral at bedtime  warfarin 7 milliGRAM(s) Oral at bedtime      MEDICATIONS  (PRN):  acetaminophen     Tablet .. 650 milliGRAM(s) Oral every 6 hours PRN Temp greater or equal to 38C (100.4F), Mild Pain (1 - 3)  albuterol/ipratropium for Nebulization 3 milliLiter(s) Nebulizer every 6 hours PRN Shortness of Breath and/or Wheezing  ondansetron Injectable 4 milliGRAM(s) IV Push every 8 hours PRN Nausea and/or Vomiting      Physical Exam    Neuro :  no focal deficits  Respiratory: CTA B/L  CV: RRR, S1S2, no murmurs,   Abdominal: Soft, NT, ND +BS,  Extremities: No edema, + peripheral pulses    ASSESSMENT    syncope pos 2nd to severe cough,   cns patho r/o,   troponinemia,   r/o acs,   right rib pain 2nd to fx,   s/p fall,   sinusitis,   cindy   h/o htn,   chf,   ppm,   dm,   PAF   aortic valve replacement (? mechanical)      PLAN    d/c tele,   statin,   neuro cons noted   Syncope in patient with dizziness and +jose orthostatics cw orthostatic syncope.  orthostatic bp q shift   trop x 4 positive noted above   cont coumadin 7 mg qhs  inr subtherapeutic noted     goal inr 2.5 to 3.5   cont hep drip as per hospital normogram  cardio f/u   cont Entresto   AICD interrogated with Episodes: Multiple episodes of NSVT and PMT at rate of 110bmp.  No events on the day of admission. noChanges made: Normal device function.  cont statin, lopressor.  echo with the left ventricular systolic function appears grossly normal.   There is mild (grade 1) left ventricular diastolic dysfunction noted    stress test neg noted      pulm f/u    robitussin, albuterol,   flonase nasal spray,   completed course of levaquin,   serum potassium wnl   lispro ss,   hgba1c 6.2 noted    phys tx eval noted and rec No skilled PT needs  cont current meds   d/c plan pending goal inr            Patient is a 78y old  Male who presents with a chief complaint of Syncope (06 Jan 2024 10:34)    pt seen in tele [ x ], reg med floor [   ], bed [ x ], chair at bedside [   ], a+ o x3 [ x ],   lethargic [  ],  nad [ x ]        Allergies    No Known Allergies        Vitals    T(F): 98 (01-07-24 @ 05:30), Max: 98.4 (01-06-24 @ 20:30)  HR: 60 (01-07-24 @ 05:30) (59 - 64)  BP: 117/66 (01-07-24 @ 05:30) (99/65 - 147/73)  RR: 18 (01-07-24 @ 05:30) (18 - 18)  SpO2: 93% (01-07-24 @ 05:30) (93% - 95%)  Wt(kg): --  CAPILLARY BLOOD GLUCOSE      POCT Blood Glucose.: 107 mg/dL (06 Jan 2024 21:18)      Labs                          14.6   8.29  )-----------( 247      ( 06 Jan 2024 06:39 )             43.5       01-06    135  |  104  |  18  ----------------------------<  96  4.1   |  27  |  1.15    Ca    9.2      06 Jan 2024 06:39  Mg     1.5     01-06                  Radiology Results      Meds    MEDICATIONS  (STANDING):  atorvastatin 20 milliGRAM(s) Oral at bedtime  fluticasone propionate 50 MICROgram(s)/spray Nasal Spray 1 Spray(s) Both Nostrils two times a day  guaiFENesin ER 1200 milliGRAM(s) Oral every 12 hours  heparin  Infusion. 1000 Unit(s)/Hr (10 mL/Hr) IV Continuous <Continuous>  insulin lispro (ADMELOG) corrective regimen sliding scale   SubCutaneous Before meals and at bedtime  metoprolol tartrate 12.5 milliGRAM(s) Oral two times a day  pantoprazole    Tablet 40 milliGRAM(s) Oral before breakfast  polyethylene glycol 3350 17 Gram(s) Oral daily  sacubitril 24 mG/valsartan 26 mG 1 Tablet(s) Oral two times a day  senna 2 Tablet(s) Oral at bedtime  warfarin 7 milliGRAM(s) Oral at bedtime      MEDICATIONS  (PRN):  acetaminophen     Tablet .. 650 milliGRAM(s) Oral every 6 hours PRN Temp greater or equal to 38C (100.4F), Mild Pain (1 - 3)  albuterol/ipratropium for Nebulization 3 milliLiter(s) Nebulizer every 6 hours PRN Shortness of Breath and/or Wheezing  ondansetron Injectable 4 milliGRAM(s) IV Push every 8 hours PRN Nausea and/or Vomiting      Physical Exam    Neuro :  no focal deficits  Respiratory: CTA B/L  CV: RRR, S1S2, no murmurs,   Abdominal: Soft, NT, ND +BS,  Extremities: No edema, + peripheral pulses    ASSESSMENT    syncope pos 2nd to severe cough,   cns patho r/o,   troponinemia,   r/o acs,   right rib pain 2nd to fx,   s/p fall,   sinusitis,   cindy   h/o htn,   chf,   ppm,   dm,   PAF   aortic valve replacement (? mechanical)      PLAN    d/c tele,   statin,   neuro cons noted   Syncope in patient with dizziness and +jose orthostatics cw orthostatic syncope.  orthostatic bp q shift   trop x 4 positive noted above   cont coumadin 7.5 mg qhs  f/u inr    goal inr 2.5 to 3.5   cont hep drip as per hospital normogram  cardio f/u   cont Entresto   AICD interrogated with Episodes: Multiple episodes of NSVT and PMT at rate of 110bmp.  No events on the day of admission. noChanges made: Normal device function.  cont statin, lopressor.  echo with the left ventricular systolic function appears grossly normal.   There is mild (grade 1) left ventricular diastolic dysfunction noted    stress test neg noted      pulm f/u    robitussin, albuterol,   flonase nasal spray,   completed course of levaquin,   serum potassium wnl   lispro ss,   hgba1c 6.2 noted    phys tx eval noted and rec No skilled PT needs  cont current meds   d/c plan pending goal inr

## 2024-01-07 NOTE — PROGRESS NOTE ADULT - PROBLEM SELECTOR PLAN 9
cont meds  cardio follow up
On Farxiga and metformin at home.   Hold oral antihypoglycemic.  c/w sliding scale.   a1c 6.2%  controlled.
cont meds  cardio follow up
C/w metoprolol w/ parameters  controlled
C/w metoprolol w/ parameters  controlled
cont meds  cardio follow up
cont meds  cardio follow up
C/w atorvastatin.   F/U lipid profile.
cont meds  cardio follow up

## 2024-01-07 NOTE — PROGRESS NOTE ADULT - PROBLEM SELECTOR PROBLEM 4
Elevated troponin
Atrial fibrillation
Elevated troponin
Atrial fibrillation
Elevated troponin
Chronic CHF
Atrial fibrillation

## 2024-01-07 NOTE — DIETITIAN INITIAL EVALUATION ADULT - PERTINENT LABORATORY DATA
01-07    135  |  104  |  21<H>  ----------------------------<  97  4.1   |  27  |  1.14    Ca    9.3      07 Jan 2024 06:41  Mg     1.6     01-07    POCT Blood Glucose.: 89 mg/dL (01-07-24 @ 07:54)  A1C with Estimated Average Glucose Result: 6.2 % (12-31-23 @ 05:45)

## 2024-01-07 NOTE — PROGRESS NOTE ADULT - PROBLEM SELECTOR PROBLEM 7
Hypertension
Diabetes mellitus
Elevated troponin
Hypertension
Diabetes mellitus
Diabetes mellitus

## 2024-01-07 NOTE — PROGRESS NOTE ADULT - PROBLEM SELECTOR PROBLEM 2
Sinusitis
Sinusitis
Elevated troponin
Sinusitis
Elevated troponin
Sinusitis
Elevated troponin

## 2024-01-07 NOTE — PROGRESS NOTE ADULT - PROBLEM SELECTOR PROBLEM 8
CAD (coronary artery disease)
Hypertension
Hyperlipidemia
Hyperlipidemia
CAD (coronary artery disease)
Hyperlipidemia
BEVERLY (acute kidney injury)
Hyperlipidemia
Hyperlipidemia

## 2024-01-07 NOTE — PROGRESS NOTE ADULT - PROBLEM SELECTOR PROBLEM 5
BEVERLY (acute kidney injury)
Atrial fibrillation
BEVERLY (acute kidney injury)
BEVERLY (acute kidney injury)
Atrial fibrillation
BEVERLY (acute kidney injury)
Atrial fibrillation
BEVERLY (acute kidney injury)
Atrial fibrillation

## 2024-01-07 NOTE — PROGRESS NOTE ADULT - SUBJECTIVE AND OBJECTIVE BOX
Patient is a 78y old  Male who presents with a chief complaint of Non-ST elevation myocardial infarction (NSTEMI)   (07 Jan 2024 09:58)  Awake, alert, comfortable in bed in NAD. Out of telemetry. Awaiting for therapeutic INR    INTERVAL HPI/OVERNIGHT EVENTS:      VITAL SIGNS:  T(F): 98 (01-07-24 @ 05:30)  HR: 60 (01-07-24 @ 05:30)  BP: 117/66 (01-07-24 @ 05:30)  RR: 18 (01-07-24 @ 05:30)  SpO2: 93% (01-07-24 @ 05:30)  Wt(kg): --  I&O's Detail          REVIEW OF SYSTEMS:    CONSTITUTIONAL:  No fevers, chills, sweats    HEENT:  Eyes:  No diplopia or blurred vision. ENT:  No earache, sore throat or runny nose.    CARDIOVASCULAR:  No pressure, squeezing, tightness, or heaviness about the chest; no palpitations.    RESPIRATORY:  Per HPI    GASTROINTESTINAL:  No abdominal pain, nausea, vomiting or diarrhea.    GENITOURINARY:  No dysuria, frequency or urgency.    NEUROLOGIC:  No paresthesias, fasciculations, seizures or weakness.    PSYCHIATRIC:  No disorder of thought or mood.      PHYSICAL EXAM:    Constitutional: Well developed and nourished  Eyes:Perrla  ENMT: normal  Neck:supple  Respiratory: good air entry  Cardiovascular: S1 S2 regular  Gastrointestinal: Soft, Non tender  Extremities: No edema  Vascular:normal  Neurological:Awake, alert,Ox3  Musculoskeletal:Normal      MEDICATIONS  (STANDING):  atorvastatin 20 milliGRAM(s) Oral at bedtime  fluticasone propionate 50 MICROgram(s)/spray Nasal Spray 1 Spray(s) Both Nostrils two times a day  guaiFENesin ER 1200 milliGRAM(s) Oral every 12 hours  heparin  Infusion. 1000 Unit(s)/Hr (10 mL/Hr) IV Continuous <Continuous>  insulin lispro (ADMELOG) corrective regimen sliding scale   SubCutaneous Before meals and at bedtime  metoprolol tartrate 12.5 milliGRAM(s) Oral two times a day  pantoprazole    Tablet 40 milliGRAM(s) Oral before breakfast  polyethylene glycol 3350 17 Gram(s) Oral daily  sacubitril 24 mG/valsartan 26 mG 1 Tablet(s) Oral two times a day  senna 2 Tablet(s) Oral at bedtime  warfarin 7.5 milliGRAM(s) Oral at bedtime    MEDICATIONS  (PRN):  acetaminophen     Tablet .. 650 milliGRAM(s) Oral every 6 hours PRN Temp greater or equal to 38C (100.4F), Mild Pain (1 - 3)  albuterol/ipratropium for Nebulization 3 milliLiter(s) Nebulizer every 6 hours PRN Shortness of Breath and/or Wheezing  ondansetron Injectable 4 milliGRAM(s) IV Push every 8 hours PRN Nausea and/or Vomiting      Allergies    No Known Allergies    Intolerances        LABS:                        14.2   8.74  )-----------( 269      ( 07 Jan 2024 06:41 )             42.6     01-07    135  |  104  |  21<H>  ----------------------------<  97  4.1   |  27  |  1.14    Ca    9.3      07 Jan 2024 06:41  Mg     1.6     01-07      PT/INR - ( 07 Jan 2024 06:41 )   PT: 22.3 sec;   INR: 2.00 ratio         PTT - ( 07 Jan 2024 06:41 )  PTT:58.2 sec  Urinalysis Basic - ( 07 Jan 2024 06:41 )    Color: x / Appearance: x / SG: x / pH: x  Gluc: 97 mg/dL / Ketone: x  / Bili: x / Urobili: x   Blood: x / Protein: x / Nitrite: x   Leuk Esterase: x / RBC: x / WBC x   Sq Epi: x / Non Sq Epi: x / Bacteria: x            CAPILLARY BLOOD GLUCOSE      POCT Blood Glucose.: 89 mg/dL (07 Jan 2024 07:54)  POCT Blood Glucose.: 107 mg/dL (06 Jan 2024 21:18)  POCT Blood Glucose.: 105 mg/dL (06 Jan 2024 16:23)  POCT Blood Glucose.: 110 mg/dL (06 Jan 2024 11:06)        RADIOLOGY & ADDITIONAL TESTS:    CXR:    Ct scan chest:    ekg;    echo:

## 2024-01-07 NOTE — PROGRESS NOTE ADULT - PROBLEM SELECTOR PROBLEM 6
Diabetes mellitus
CAD (coronary artery disease)
Diabetes mellitus
Chronic CHF
Chronic CHF
Diabetes mellitus
Diabetes mellitus
Chronic CHF

## 2024-01-07 NOTE — DIETITIAN INITIAL EVALUATION ADULT - FACTORS AFF FOOD INTAKE
acute on chronic comorbidities/Gnosticism/ethnic/cultural/personal food preferences acute on chronic comorbidities/Episcopal/ethnic/cultural/personal food preferences

## 2024-01-07 NOTE — PROGRESS NOTE ADULT - PROBLEM SELECTOR PLAN 1
Off Tele monitoring  Cardio follow up  neuro follow up  AICD interrogation Q3 months  stress test negative

## 2024-01-08 VITALS
TEMPERATURE: 98 F | SYSTOLIC BLOOD PRESSURE: 137 MMHG | OXYGEN SATURATION: 94 % | DIASTOLIC BLOOD PRESSURE: 62 MMHG | RESPIRATION RATE: 16 BRPM | HEART RATE: 67 BPM

## 2024-01-08 LAB
ANION GAP SERPL CALC-SCNC: 5 MMOL/L — SIGNIFICANT CHANGE UP (ref 5–17)
ANION GAP SERPL CALC-SCNC: 5 MMOL/L — SIGNIFICANT CHANGE UP (ref 5–17)
APTT BLD: 56.7 SEC — HIGH (ref 24.5–35.6)
APTT BLD: 56.7 SEC — HIGH (ref 24.5–35.6)
APTT BLD: 87.1 SEC — HIGH (ref 24.5–35.6)
APTT BLD: 87.1 SEC — HIGH (ref 24.5–35.6)
BUN SERPL-MCNC: 18 MG/DL — SIGNIFICANT CHANGE UP (ref 7–18)
BUN SERPL-MCNC: 18 MG/DL — SIGNIFICANT CHANGE UP (ref 7–18)
CALCIUM SERPL-MCNC: 9.3 MG/DL — SIGNIFICANT CHANGE UP (ref 8.4–10.5)
CALCIUM SERPL-MCNC: 9.3 MG/DL — SIGNIFICANT CHANGE UP (ref 8.4–10.5)
CHLORIDE SERPL-SCNC: 104 MMOL/L — SIGNIFICANT CHANGE UP (ref 96–108)
CHLORIDE SERPL-SCNC: 104 MMOL/L — SIGNIFICANT CHANGE UP (ref 96–108)
CO2 SERPL-SCNC: 27 MMOL/L — SIGNIFICANT CHANGE UP (ref 22–31)
CO2 SERPL-SCNC: 27 MMOL/L — SIGNIFICANT CHANGE UP (ref 22–31)
CREAT SERPL-MCNC: 1.22 MG/DL — SIGNIFICANT CHANGE UP (ref 0.5–1.3)
CREAT SERPL-MCNC: 1.22 MG/DL — SIGNIFICANT CHANGE UP (ref 0.5–1.3)
EGFR: 61 ML/MIN/1.73M2 — SIGNIFICANT CHANGE UP
EGFR: 61 ML/MIN/1.73M2 — SIGNIFICANT CHANGE UP
GLUCOSE BLDC GLUCOMTR-MCNC: 79 MG/DL — SIGNIFICANT CHANGE UP (ref 70–99)
GLUCOSE BLDC GLUCOMTR-MCNC: 79 MG/DL — SIGNIFICANT CHANGE UP (ref 70–99)
GLUCOSE BLDC GLUCOMTR-MCNC: 97 MG/DL — SIGNIFICANT CHANGE UP (ref 70–99)
GLUCOSE BLDC GLUCOMTR-MCNC: 97 MG/DL — SIGNIFICANT CHANGE UP (ref 70–99)
GLUCOSE SERPL-MCNC: 95 MG/DL — SIGNIFICANT CHANGE UP (ref 70–99)
GLUCOSE SERPL-MCNC: 95 MG/DL — SIGNIFICANT CHANGE UP (ref 70–99)
HCT VFR BLD CALC: 42.3 % — SIGNIFICANT CHANGE UP (ref 39–50)
HCT VFR BLD CALC: 42.3 % — SIGNIFICANT CHANGE UP (ref 39–50)
HGB BLD-MCNC: 14.1 G/DL — SIGNIFICANT CHANGE UP (ref 13–17)
HGB BLD-MCNC: 14.1 G/DL — SIGNIFICANT CHANGE UP (ref 13–17)
INR BLD: 2.17 RATIO — HIGH (ref 0.85–1.18)
INR BLD: 2.17 RATIO — HIGH (ref 0.85–1.18)
MAGNESIUM SERPL-MCNC: 1.6 MG/DL — SIGNIFICANT CHANGE UP (ref 1.6–2.6)
MAGNESIUM SERPL-MCNC: 1.6 MG/DL — SIGNIFICANT CHANGE UP (ref 1.6–2.6)
MCHC RBC-ENTMCNC: 30.9 PG — SIGNIFICANT CHANGE UP (ref 27–34)
MCHC RBC-ENTMCNC: 30.9 PG — SIGNIFICANT CHANGE UP (ref 27–34)
MCHC RBC-ENTMCNC: 33.3 GM/DL — SIGNIFICANT CHANGE UP (ref 32–36)
MCHC RBC-ENTMCNC: 33.3 GM/DL — SIGNIFICANT CHANGE UP (ref 32–36)
MCV RBC AUTO: 92.6 FL — SIGNIFICANT CHANGE UP (ref 80–100)
MCV RBC AUTO: 92.6 FL — SIGNIFICANT CHANGE UP (ref 80–100)
NRBC # BLD: 0 /100 WBCS — SIGNIFICANT CHANGE UP (ref 0–0)
NRBC # BLD: 0 /100 WBCS — SIGNIFICANT CHANGE UP (ref 0–0)
PLATELET # BLD AUTO: 268 K/UL — SIGNIFICANT CHANGE UP (ref 150–400)
PLATELET # BLD AUTO: 268 K/UL — SIGNIFICANT CHANGE UP (ref 150–400)
POTASSIUM SERPL-MCNC: 3.8 MMOL/L — SIGNIFICANT CHANGE UP (ref 3.5–5.3)
POTASSIUM SERPL-MCNC: 3.8 MMOL/L — SIGNIFICANT CHANGE UP (ref 3.5–5.3)
POTASSIUM SERPL-SCNC: 3.8 MMOL/L — SIGNIFICANT CHANGE UP (ref 3.5–5.3)
POTASSIUM SERPL-SCNC: 3.8 MMOL/L — SIGNIFICANT CHANGE UP (ref 3.5–5.3)
PROTHROM AB SERPL-ACNC: 24.2 SEC — HIGH (ref 9.5–13)
PROTHROM AB SERPL-ACNC: 24.2 SEC — HIGH (ref 9.5–13)
RBC # BLD: 4.57 M/UL — SIGNIFICANT CHANGE UP (ref 4.2–5.8)
RBC # BLD: 4.57 M/UL — SIGNIFICANT CHANGE UP (ref 4.2–5.8)
RBC # FLD: 13.9 % — SIGNIFICANT CHANGE UP (ref 10.3–14.5)
RBC # FLD: 13.9 % — SIGNIFICANT CHANGE UP (ref 10.3–14.5)
SODIUM SERPL-SCNC: 136 MMOL/L — SIGNIFICANT CHANGE UP (ref 135–145)
SODIUM SERPL-SCNC: 136 MMOL/L — SIGNIFICANT CHANGE UP (ref 135–145)
WBC # BLD: 9.83 K/UL — SIGNIFICANT CHANGE UP (ref 3.8–10.5)
WBC # BLD: 9.83 K/UL — SIGNIFICANT CHANGE UP (ref 3.8–10.5)
WBC # FLD AUTO: 9.83 K/UL — SIGNIFICANT CHANGE UP (ref 3.8–10.5)
WBC # FLD AUTO: 9.83 K/UL — SIGNIFICANT CHANGE UP (ref 3.8–10.5)

## 2024-01-08 PROCEDURE — 94640 AIRWAY INHALATION TREATMENT: CPT

## 2024-01-08 PROCEDURE — 85027 COMPLETE CBC AUTOMATED: CPT

## 2024-01-08 PROCEDURE — A9502: CPT

## 2024-01-08 PROCEDURE — 93017 CV STRESS TEST TRACING ONLY: CPT

## 2024-01-08 PROCEDURE — 93005 ELECTROCARDIOGRAM TRACING: CPT

## 2024-01-08 PROCEDURE — 36415 COLL VENOUS BLD VENIPUNCTURE: CPT

## 2024-01-08 PROCEDURE — 71045 X-RAY EXAM CHEST 1 VIEW: CPT

## 2024-01-08 PROCEDURE — 85025 COMPLETE CBC W/AUTO DIFF WBC: CPT

## 2024-01-08 PROCEDURE — 97161 PT EVAL LOW COMPLEX 20 MIN: CPT

## 2024-01-08 PROCEDURE — 87635 SARS-COV-2 COVID-19 AMP PRB: CPT

## 2024-01-08 PROCEDURE — 82962 GLUCOSE BLOOD TEST: CPT

## 2024-01-08 PROCEDURE — 72125 CT NECK SPINE W/O DYE: CPT | Mod: MA

## 2024-01-08 PROCEDURE — 80053 COMPREHEN METABOLIC PANEL: CPT

## 2024-01-08 PROCEDURE — 83036 HEMOGLOBIN GLYCOSYLATED A1C: CPT

## 2024-01-08 PROCEDURE — 86803 HEPATITIS C AB TEST: CPT

## 2024-01-08 PROCEDURE — 83735 ASSAY OF MAGNESIUM: CPT

## 2024-01-08 PROCEDURE — 82550 ASSAY OF CK (CPK): CPT

## 2024-01-08 PROCEDURE — 70450 CT HEAD/BRAIN W/O DYE: CPT | Mod: MA

## 2024-01-08 PROCEDURE — 99285 EMERGENCY DEPT VISIT HI MDM: CPT

## 2024-01-08 PROCEDURE — 80061 LIPID PANEL: CPT

## 2024-01-08 PROCEDURE — 83880 ASSAY OF NATRIURETIC PEPTIDE: CPT

## 2024-01-08 PROCEDURE — 85610 PROTHROMBIN TIME: CPT

## 2024-01-08 PROCEDURE — 84100 ASSAY OF PHOSPHORUS: CPT

## 2024-01-08 PROCEDURE — 71250 CT THORAX DX C-: CPT | Mod: MA

## 2024-01-08 PROCEDURE — 85730 THROMBOPLASTIN TIME PARTIAL: CPT

## 2024-01-08 PROCEDURE — 93306 TTE W/DOPPLER COMPLETE: CPT

## 2024-01-08 PROCEDURE — 0225U NFCT DS DNA&RNA 21 SARSCOV2: CPT

## 2024-01-08 PROCEDURE — 80048 BASIC METABOLIC PNL TOTAL CA: CPT

## 2024-01-08 PROCEDURE — 78452 HT MUSCLE IMAGE SPECT MULT: CPT

## 2024-01-08 PROCEDURE — 84484 ASSAY OF TROPONIN QUANT: CPT

## 2024-01-08 RX ORDER — WARFARIN SODIUM 2.5 MG/1
1 TABLET ORAL
Qty: 2 | Refills: 0
Start: 2024-01-08 | End: 2024-01-09

## 2024-01-08 RX ORDER — SACUBITRIL AND VALSARTAN 24; 26 MG/1; MG/1
1 TABLET, FILM COATED ORAL
Refills: 0 | DISCHARGE

## 2024-01-08 RX ORDER — WARFARIN SODIUM 2.5 MG/1
1 TABLET ORAL
Refills: 0 | DISCHARGE

## 2024-01-08 RX ORDER — SACUBITRIL AND VALSARTAN 24; 26 MG/1; MG/1
1 TABLET, FILM COATED ORAL
Qty: 0 | Refills: 0 | DISCHARGE
Start: 2024-01-08

## 2024-01-08 RX ORDER — INDAPAMIDE 1.25 MG
1 TABLET ORAL
Refills: 0 | DISCHARGE

## 2024-01-08 RX ORDER — FINERENONE 20 MG/1
1 TABLET, FILM COATED ORAL
Refills: 0 | DISCHARGE

## 2024-01-08 RX ADMIN — SACUBITRIL AND VALSARTAN 1 TABLET(S): 24; 26 TABLET, FILM COATED ORAL at 06:08

## 2024-01-08 RX ADMIN — PANTOPRAZOLE SODIUM 40 MILLIGRAM(S): 20 TABLET, DELAYED RELEASE ORAL at 06:07

## 2024-01-08 RX ADMIN — Medication 1 SPRAY(S): at 06:08

## 2024-01-08 RX ADMIN — POLYETHYLENE GLYCOL 3350 17 GRAM(S): 17 POWDER, FOR SOLUTION ORAL at 11:29

## 2024-01-08 RX ADMIN — Medication 12.5 MILLIGRAM(S): at 06:07

## 2024-01-08 RX ADMIN — HEPARIN SODIUM 800 UNIT(S)/HR: 5000 INJECTION INTRAVENOUS; SUBCUTANEOUS at 00:54

## 2024-01-08 RX ADMIN — HEPARIN SODIUM 800 UNIT(S)/HR: 5000 INJECTION INTRAVENOUS; SUBCUTANEOUS at 07:07

## 2024-01-08 RX ADMIN — Medication 1200 MILLIGRAM(S): at 06:07

## 2024-01-08 RX ADMIN — HEPARIN SODIUM 800 UNIT(S)/HR: 5000 INJECTION INTRAVENOUS; SUBCUTANEOUS at 07:37

## 2024-01-08 NOTE — DISCHARGE NOTE NURSING/CASE MANAGEMENT/SOCIAL WORK - NSDCPEPTCOWAFU_GEN_ALL_CORE
Patient scheduled for an establish care visit with Dr. Mathur on 5/3. Last WCE done on 9/12/2022, won't be due until after 9/12/2023.     Left message on moms phone to return call and verify if insurance has change or if there are concerns that can be address at 5/3 visit.      The patient is a 8y9m Female complaining of eye redness. Go for blood tests as directed. Because your dose is based on the PT/INR blood test, it is very important that you get your blood tested on the scheduled date and time and to keep your health care provider appointments.   Please follow up with your doctor within 3 days of discharge to schedule your next blood test.

## 2024-01-08 NOTE — PROGRESS NOTE ADULT - SUBJECTIVE AND OBJECTIVE BOX
Patient is a 78y old  Male who presents with a chief complaint of Syncope,Rib fx (07 Jan 2024 11:44)    pt seen in tele [ x ], reg med floor [   ], bed [ x ], chair at bedside [   ], a+ o x3 [ x ],   lethargic [  ],  nad [ x ]        Allergies    No Known Allergies        Vitals    T(F): 97.7 (01-08-24 @ 05:58), Max: 98.6 (01-07-24 @ 14:01)  HR: 60 (01-08-24 @ 05:58) (60 - 64)  BP: 127/65 (01-08-24 @ 05:58) (115/69 - 147/76)  RR: 17 (01-08-24 @ 05:58) (17 - 18)  SpO2: 94% (01-08-24 @ 05:58) (91% - 96%)  Wt(kg): --  CAPILLARY BLOOD GLUCOSE      POCT Blood Glucose.: 125 mg/dL (07 Jan 2024 21:06)      Labs                          14.2   8.74  )-----------( 269      ( 07 Jan 2024 06:41 )             42.6       01-07    135  |  104  |  21<H>  ----------------------------<  97  4.1   |  27  |  1.14    Ca    9.3      07 Jan 2024 06:41  Mg     1.6     01-07                  Radiology Results      Meds    MEDICATIONS  (STANDING):  atorvastatin 20 milliGRAM(s) Oral at bedtime  fluticasone propionate 50 MICROgram(s)/spray Nasal Spray 1 Spray(s) Both Nostrils two times a day  guaiFENesin ER 1200 milliGRAM(s) Oral every 12 hours  heparin  Infusion. 1000 Unit(s)/Hr (10 mL/Hr) IV Continuous <Continuous>  insulin lispro (ADMELOG) corrective regimen sliding scale   SubCutaneous Before meals and at bedtime  metoprolol tartrate 12.5 milliGRAM(s) Oral two times a day  pantoprazole    Tablet 40 milliGRAM(s) Oral before breakfast  polyethylene glycol 3350 17 Gram(s) Oral daily  sacubitril 24 mG/valsartan 26 mG 1 Tablet(s) Oral two times a day  senna 2 Tablet(s) Oral at bedtime  warfarin 7.5 milliGRAM(s) Oral at bedtime      MEDICATIONS  (PRN):  acetaminophen     Tablet .. 650 milliGRAM(s) Oral every 6 hours PRN Temp greater or equal to 38C (100.4F), Mild Pain (1 - 3)  albuterol/ipratropium for Nebulization 3 milliLiter(s) Nebulizer every 6 hours PRN Shortness of Breath and/or Wheezing  ondansetron Injectable 4 milliGRAM(s) IV Push every 8 hours PRN Nausea and/or Vomiting      Physical Exam    Neuro :  no focal deficits  Respiratory: CTA B/L  CV: RRR, S1S2, no murmurs,   Abdominal: Soft, NT, ND +BS,  Extremities: No edema, + peripheral pulses    ASSESSMENT    syncope pos 2nd to severe cough,   cns patho r/o,   troponinemia,   r/o acs,   right rib pain 2nd to fx,   s/p fall,   sinusitis,   cindy   h/o htn,   chf,   ppm,   dm,   PAF   aortic valve replacement (? mechanical)      PLAN    d/c tele,   statin,   neuro cons noted   Syncope in patient with dizziness and +jose orthostatics cw orthostatic syncope.  orthostatic bp q shift   trop x 4 positive noted above   cont coumadin 7 mg qhs  inr subtherapeutic noted     goal inr 2.5 to 3.5   cont hep drip as per hospital normogram  cardio f/u   cont Entresto   AICD interrogated with Episodes: Multiple episodes of NSVT and PMT at rate of 110bmp.  No events on the day of admission. noChanges made: Normal device function.  cont statin, lopressor.  echo with the left ventricular systolic function appears grossly normal.   There is mild (grade 1) left ventricular diastolic dysfunction noted    stress test neg noted      pulm f/u    robitussin, albuterol,   flonase nasal spray,   completed course of levaquin,   serum potassium wnl   lispro ss,   hgba1c 6.2 noted    phys tx eval noted and rec No skilled PT needs  cont current meds   d/c plan pending goal inr            Patient is a 78y old  Male who presents with a chief complaint of Syncope,Rib fx (07 Jan 2024 11:44)    pt seen in tele [  ], reg med floor [ x  ], bed [ x ], chair at bedside [   ], a+ o x3 [ x ],   lethargic [  ],  nad [ x ]        Allergies    No Known Allergies        Vitals    T(F): 97.7 (01-08-24 @ 05:58), Max: 98.6 (01-07-24 @ 14:01)  HR: 60 (01-08-24 @ 05:58) (60 - 64)  BP: 127/65 (01-08-24 @ 05:58) (115/69 - 147/76)  RR: 17 (01-08-24 @ 05:58) (17 - 18)  SpO2: 94% (01-08-24 @ 05:58) (91% - 96%)  Wt(kg): --  CAPILLARY BLOOD GLUCOSE      POCT Blood Glucose.: 125 mg/dL (07 Jan 2024 21:06)      Labs                          14.2   8.74  )-----------( 269      ( 07 Jan 2024 06:41 )             42.6       01-07    135  |  104  |  21<H>  ----------------------------<  97  4.1   |  27  |  1.14    Ca    9.3      07 Jan 2024 06:41  Mg     1.6     01-07      Prothrombin Time and INR, Plasma in AM (01.08.24 @ 06:55)   Prothrombin Time, Plasma: 24.2 sec  INR: 2.17:            Radiology Results      Meds    MEDICATIONS  (STANDING):  atorvastatin 20 milliGRAM(s) Oral at bedtime  fluticasone propionate 50 MICROgram(s)/spray Nasal Spray 1 Spray(s) Both Nostrils two times a day  guaiFENesin ER 1200 milliGRAM(s) Oral every 12 hours  heparin  Infusion. 1000 Unit(s)/Hr (10 mL/Hr) IV Continuous <Continuous>  insulin lispro (ADMELOG) corrective regimen sliding scale   SubCutaneous Before meals and at bedtime  metoprolol tartrate 12.5 milliGRAM(s) Oral two times a day  pantoprazole    Tablet 40 milliGRAM(s) Oral before breakfast  polyethylene glycol 3350 17 Gram(s) Oral daily  sacubitril 24 mG/valsartan 26 mG 1 Tablet(s) Oral two times a day  senna 2 Tablet(s) Oral at bedtime  warfarin 7.5 milliGRAM(s) Oral at bedtime      MEDICATIONS  (PRN):  acetaminophen     Tablet .. 650 milliGRAM(s) Oral every 6 hours PRN Temp greater or equal to 38C (100.4F), Mild Pain (1 - 3)  albuterol/ipratropium for Nebulization 3 milliLiter(s) Nebulizer every 6 hours PRN Shortness of Breath and/or Wheezing  ondansetron Injectable 4 milliGRAM(s) IV Push every 8 hours PRN Nausea and/or Vomiting      Physical Exam    Neuro :  no focal deficits  Respiratory: CTA B/L  CV: RRR, S1S2, no murmurs,   Abdominal: Soft, NT, ND +BS,  Extremities: No edema, + peripheral pulses    ASSESSMENT    syncope pos 2nd to severe cough,   cns patho r/o,   troponinemia,   r/o acs,   right rib pain 2nd to fx,   s/p fall,   sinusitis,   cindy   h/o htn,   chf,   ppm,   dm,   PAF   aortic valve replacement (? mechanical)      PLAN    d/c tele,   statin,   neuro cons noted   Syncope in patient with dizziness and +jose orthostatics cw orthostatic syncope.  orthostatic bp q shift   trop x 4 positive noted above   cont coumadin 7.5 mg qhs  goal inr 2.5 to 3.5   d/w cardio d/c hep drip    cardio f/u   cont Entresto   AICD interrogated with Episodes: Multiple episodes of NSVT and PMT at rate of 110bmp.  No events on the day of admission. noChanges made: Normal device function.  cont statin, lopressor.  echo with the left ventricular systolic function appears grossly normal.   There is mild (grade 1) left ventricular diastolic dysfunction noted    stress test neg noted      pulm f/u    robitussin, albuterol,   flonase nasal spray,   completed course of levaquin,   serum potassium wnl   lispro ss,   hgba1c 6.2 noted    phys tx eval noted and rec No skilled PT needs  cont current meds   pt may be d/c home as per cardio

## 2024-01-08 NOTE — PROGRESS NOTE ADULT - SUBJECTIVE AND OBJECTIVE BOX
Time of Visit:  Patient seen and examined.     MEDICATIONS  (STANDING):  atorvastatin 20 milliGRAM(s) Oral at bedtime  fluticasone propionate 50 MICROgram(s)/spray Nasal Spray 1 Spray(s) Both Nostrils two times a day  guaiFENesin ER 1200 milliGRAM(s) Oral every 12 hours  insulin lispro (ADMELOG) corrective regimen sliding scale   SubCutaneous Before meals and at bedtime  metoprolol tartrate 12.5 milliGRAM(s) Oral two times a day  pantoprazole    Tablet 40 milliGRAM(s) Oral before breakfast  polyethylene glycol 3350 17 Gram(s) Oral daily  sacubitril 24 mG/valsartan 26 mG 1 Tablet(s) Oral two times a day  senna 2 Tablet(s) Oral at bedtime  warfarin 7.5 milliGRAM(s) Oral at bedtime      MEDICATIONS  (PRN):  acetaminophen     Tablet .. 650 milliGRAM(s) Oral every 6 hours PRN Temp greater or equal to 38C (100.4F), Mild Pain (1 - 3)  albuterol/ipratropium for Nebulization 3 milliLiter(s) Nebulizer every 6 hours PRN Shortness of Breath and/or Wheezing  ondansetron Injectable 4 milliGRAM(s) IV Push every 8 hours PRN Nausea and/or Vomiting       Medications up to date at time of exam.    ROS; No fever, chills, cough, congestion on exam. Denies SOB on exam.   PHYSICAL EXAMINATION:  Vital Signs Last 24 Hrs  T(C): 36.9 (08 Jan 2024 12:31), Max: 36.9 (07 Jan 2024 20:56)  T(F): 98.4 (08 Jan 2024 12:31), Max: 98.4 (07 Jan 2024 20:56)  HR: 67 (08 Jan 2024 12:31) (60 - 67)  BP: 137/62 (08 Jan 2024 12:31) (115/69 - 147/76)  BP(mean): 100 (07 Jan 2024 17:30) (100 - 100)  RR: 16 (08 Jan 2024 12:31) (16 - 18)  SpO2: 94% (08 Jan 2024 12:31) (93% - 96%)    Parameters below as of 08 Jan 2024 12:31  Patient On (Oxygen Delivery Method): room air       (if applicable)    General : Alert and oriented. Able to answer question in [place] language with no SOB. No acute distress .     HEENT: Head is normocephalic and atraumatic. No nasal tenderness. Extraocular muscles are intact. Mucous membranes are moist.     NECK: Supple, no palpable adenopathy.    LUNGS: Clear to auscultation bilaterally with no wheezing, rales, or rhonchi. No use of accessory muscle.     HEART: S1 S2 Regular rate and no click / rub.     ABDOMEN: Soft, nontender, and nondistended. Active bowel sounds.     EXTREMITIES: Without any cyanosis, clubbing, rash, lesions or edema.    NEUROLOGIC: Awake, alert, oriented.     SKIN: Warm and moist . Non diaphoretic.       LABS:                        14.1   9.83  )-----------( 268      ( 08 Jan 2024 06:55 )             42.3     01-08    136  |  104  |  18  ----------------------------<  95  3.8   |  27  |  1.22    Ca    9.3      08 Jan 2024 06:55  Mg     1.6     01-08      PT/INR - ( 08 Jan 2024 06:55 )   PT: 24.2 sec;   INR: 2.17 ratio         PTT - ( 08 Jan 2024 06:55 )  PTT:87.1 sec  Urinalysis Basic - ( 08 Jan 2024 06:55 )    Color: x / Appearance: x / SG: x / pH: x  Gluc: 95 mg/dL / Ketone: x  / Bili: x / Urobili: x   Blood: x / Protein: x / Nitrite: x   Leuk Esterase: x / RBC: x / WBC x   Sq Epi: x / Non Sq Epi: x / Bacteria: x      MICROBIOLOGY: (if applicable)    RADIOLOGY & ADDITIONAL STUDIES:  EKG:   CXR:  ECHO:    IMPRESSION: 78y Male PAST MEDICAL & SURGICAL HISTORY:  Pacemaker      Diabetes mellitus      Hypertension      Hyperlipidemia    Covering Dr Llanos for Pulmonary .     Impression: This is a 77 Y/O male presented on admission with Dizziness with +ve Orthostatic Syncope. Stress Test Negative and s/p AICD Interrogated . For pulmonary follow up due to episode of Acute Cough due to Viral etiology  underlying Bronchospasm . Negative RVP and Covid 19.     Suggestion:  O2 saturation 98% room air. So far saturating good room air.   On PRN Duoneb via nebulization Q 6 Hours.  On Fluticasone 50 mcg nasal spray Daily.   Outpatient PFT .   Time of Visit:  Patient seen and examined.     MEDICATIONS  (STANDING):  atorvastatin 20 milliGRAM(s) Oral at bedtime  fluticasone propionate 50 MICROgram(s)/spray Nasal Spray 1 Spray(s) Both Nostrils two times a day  guaiFENesin ER 1200 milliGRAM(s) Oral every 12 hours  insulin lispro (ADMELOG) corrective regimen sliding scale   SubCutaneous Before meals and at bedtime  metoprolol tartrate 12.5 milliGRAM(s) Oral two times a day  pantoprazole    Tablet 40 milliGRAM(s) Oral before breakfast  polyethylene glycol 3350 17 Gram(s) Oral daily  sacubitril 24 mG/valsartan 26 mG 1 Tablet(s) Oral two times a day  senna 2 Tablet(s) Oral at bedtime  warfarin 7.5 milliGRAM(s) Oral at bedtime      MEDICATIONS  (PRN):  acetaminophen     Tablet .. 650 milliGRAM(s) Oral every 6 hours PRN Temp greater or equal to 38C (100.4F), Mild Pain (1 - 3)  albuterol/ipratropium for Nebulization 3 milliLiter(s) Nebulizer every 6 hours PRN Shortness of Breath and/or Wheezing  ondansetron Injectable 4 milliGRAM(s) IV Push every 8 hours PRN Nausea and/or Vomiting       Medications up to date at time of exam.    ROS; No fever, chills, cough, congestion on exam. Denies SOB on exam.   PHYSICAL EXAMINATION:  Vital Signs Last 24 Hrs  T(C): 36.9 (08 Jan 2024 12:31), Max: 36.9 (07 Jan 2024 20:56)  T(F): 98.4 (08 Jan 2024 12:31), Max: 98.4 (07 Jan 2024 20:56)  HR: 67 (08 Jan 2024 12:31) (60 - 67)  BP: 137/62 (08 Jan 2024 12:31) (115/69 - 147/76)  BP(mean): 100 (07 Jan 2024 17:30) (100 - 100)  RR: 16 (08 Jan 2024 12:31) (16 - 18)  SpO2: 94% (08 Jan 2024 12:31) (93% - 96%)    Parameters below as of 08 Jan 2024 12:31  Patient On (Oxygen Delivery Method): room air       (if applicable)    General : Alert and oriented. Able to answer question in [place] language with no SOB. No acute distress .     HEENT: Head is normocephalic and atraumatic. No nasal tenderness. Extraocular muscles are intact. Mucous membranes are moist.     NECK: Supple, no palpable adenopathy.    LUNGS: Clear to auscultation bilaterally with no wheezing, rales, or rhonchi. No use of accessory muscle.     HEART: S1 S2 Regular rate and no click / rub.     ABDOMEN: Soft, nontender, and nondistended. Active bowel sounds.     EXTREMITIES: Without any cyanosis, clubbing, rash, lesions or edema.    NEUROLOGIC: Awake, alert, oriented.     SKIN: Warm and moist . Non diaphoretic.       LABS:                        14.1   9.83  )-----------( 268      ( 08 Jan 2024 06:55 )             42.3     01-08    136  |  104  |  18  ----------------------------<  95  3.8   |  27  |  1.22    Ca    9.3      08 Jan 2024 06:55  Mg     1.6     01-08      PT/INR - ( 08 Jan 2024 06:55 )   PT: 24.2 sec;   INR: 2.17 ratio         PTT - ( 08 Jan 2024 06:55 )  PTT:87.1 sec  Urinalysis Basic - ( 08 Jan 2024 06:55 )    Color: x / Appearance: x / SG: x / pH: x  Gluc: 95 mg/dL / Ketone: x  / Bili: x / Urobili: x   Blood: x / Protein: x / Nitrite: x   Leuk Esterase: x / RBC: x / WBC x   Sq Epi: x / Non Sq Epi: x / Bacteria: x      MICROBIOLOGY: (if applicable)    RADIOLOGY & ADDITIONAL STUDIES:  EKG:   CXR:  ECHO:    IMPRESSION: 78y Male PAST MEDICAL & SURGICAL HISTORY:  Pacemaker      Diabetes mellitus      Hypertension      Hyperlipidemia    Covering Dr Llanos for Pulmonary .     Impression: This is a 77 Y/O male presented on admission with Dizziness with +ve Orthostatic Syncope. Stress Test Negative and s/p AICD Interrogated . For pulmonary follow up due to episode of Acute Cough due to Viral etiology  underlying Bronchospasm . Negative RVP and Covid 19.     Suggestion:  O2 saturation 98% room air. So far saturating good room air.   On PRN Duoneb via nebulization Q 6 Hours.  On Fluticasone 50 mcg nasal spray Daily.   Outpatient PFT .

## 2024-01-08 NOTE — PROGRESS NOTE ADULT - PROVIDER SPECIALTY LIST ADULT
Cardiology
Internal Medicine
Cardiology
Internal Medicine
Pulmonology
Cardiology
Internal Medicine
Pulmonology
Internal Medicine

## 2024-01-08 NOTE — DISCHARGE NOTE NURSING/CASE MANAGEMENT/SOCIAL WORK - NSDCPEFALRISK_GEN_ALL_CORE
For information on Fall & Injury Prevention, visit: [email or web address] OR  [email or web address] OR  https://www.cdc.gov/steadi/patient.html For information on Fall & Injury Prevention, visit: [email or web address] OR  [email or web address] OR  https://www.cdc.gov/steadi/patient.html

## 2024-01-08 NOTE — PROGRESS NOTE ADULT - NS ATTEND AMEND GEN_ALL_CORE FT
Impression: This is a 79 Y/O male presented on admission with Dizziness with +ve Orthostatic Syncope. Stress Test Negative and s/p AICD Interrogated . For pulmonary follow up due to episode of Acute Cough due to Viral etiology  underlying Bronchospasm . Negative RVP and Covid 19.     Suggestion:  O2 saturation 98% room air. So far saturating good room air.   On PRN Duoneb via nebulization Q 6 Hours.  On Fluticasone 50 mcg nasal spray Daily.   Outpatient PFT . Impression: This is a 77 Y/O male presented on admission with Dizziness with +ve Orthostatic Syncope. Stress Test Negative and s/p AICD Interrogated . For pulmonary follow up due to episode of Acute Cough due to Viral etiology  underlying Bronchospasm . Negative RVP and Covid 19.     Suggestion:  O2 saturation 98% room air. So far saturating good room air.   On PRN Duoneb via nebulization Q 6 Hours.  On Fluticasone 50 mcg nasal spray Daily.   Outpatient PFT .

## 2024-01-08 NOTE — DISCHARGE NOTE NURSING/CASE MANAGEMENT/SOCIAL WORK - PATIENT PORTAL LINK FT
You can access the FollowMyHealth Patient Portal offered by [facility] by registering at the following website: [email or web address]. By joining [facility]’s FollowMyHealth portal, you will also be able to view your health information using other applications (apps) compatible with our system. You can access the FollowMyHealth Patient Portal offered by [facility] by registering at the following website: [email or web address]. By joining [employer]’s FollowMyHealth portal, you will also be able to view your health information using other applications (apps) compatible with our system.

## 2024-01-08 NOTE — PROGRESS NOTE ADULT - SUBJECTIVE AND OBJECTIVE BOX
Date of Service 01-08-24 @ 10:11    CHIEF COMPLAINT:Patient is a 78y old  Male who presents with a chief complaint of Syncope,Rib fx.Pt appears comfortable.    	  REVIEW OF SYSTEMS:  CONSTITUTIONAL: No fever, weight loss, or fatigue  EYES: No eye pain, visual disturbances, or discharge  ENT:  No difficulty hearing, tinnitus, vertigo; No sinus or throat pain  NECK: No pain or stiffness  RESPIRATORY: No cough, wheezing, chills or hemoptysis; No Shortness of Breath  CARDIOVASCULAR: No chest pain, palpitations, passing out, dizziness, or leg swelling  GASTROINTESTINAL: No abdominal or epigastric pain. No nausea, vomiting, or hematemesis; No diarrhea or constipation. No melena or hematochezia.  GENITOURINARY: No dysuria, frequency, hematuria, or incontinence  NEUROLOGICAL: No headaches, memory loss, loss of strength, numbness, or tremors  SKIN: No itching, burning, rashes, or lesions   LYMPH Nodes: No enlarged glands  ENDOCRINE: No heat or cold intolerance; No hair loss  MUSCULOSKELETAL: No joint pain or swelling; No muscle, back, or extremity pain  PSYCHIATRIC: No depression, anxiety, mood swings, or difficulty sleeping  HEME/LYMPH: No easy bruising, or bleeding gums  ALLERGY AND IMMUNOLOGIC: No hives or eczema	        PHYSICAL EXAM:  T(C): 36.5 (01-08-24 @ 05:58), Max: 37 (01-07-24 @ 14:01)  HR: 60 (01-08-24 @ 05:58) (60 - 64)  BP: 127/65 (01-08-24 @ 05:58) (115/69 - 147/76)  RR: 17 (01-08-24 @ 05:58) (17 - 18)  SpO2: 94% (01-08-24 @ 05:58) (91% - 96%)  Wt(kg): --  I&O's Summary      Appearance: Normal	  HEENT:   Normal oral mucosa, PERRL, EOMI	  Lymphatic: No lymphadenopathy  Cardiovascular: Normal S1 S2, +click  Respiratory: Lungs clear to auscultation	  Psychiatry: A & O x 3, Mood & affect appropriate  Gastrointestinal:  Soft, Non-tender, + BS	  Skin: No rashes, No ecchymoses, No cyanosis	  Neurologic: Non-focal  Extremities: Normal range of motion, No clubbing, cyanosis or edema  Vascular: Peripheral pulses palpable 2+ bilaterally    MEDICATIONS  (STANDING):  atorvastatin 20 milliGRAM(s) Oral at bedtime  fluticasone propionate 50 MICROgram(s)/spray Nasal Spray 1 Spray(s) Both Nostrils two times a day  guaiFENesin ER 1200 milliGRAM(s) Oral every 12 hours  heparin  Infusion. 1000 Unit(s)/Hr (10 mL/Hr) IV Continuous <Continuous>  insulin lispro (ADMELOG) corrective regimen sliding scale   SubCutaneous Before meals and at bedtime  metoprolol tartrate 12.5 milliGRAM(s) Oral two times a day  pantoprazole    Tablet 40 milliGRAM(s) Oral before breakfast  polyethylene glycol 3350 17 Gram(s) Oral daily  sacubitril 24 mG/valsartan 26 mG 1 Tablet(s) Oral two times a day  senna 2 Tablet(s) Oral at bedtime  warfarin 7.5 milliGRAM(s) Oral at bedtime        LABS:	 	                                    14.1   9.83  )-----------( 268      ( 08 Jan 2024 06:55 )             42.3     01-08    136  |  104  |  18  ----------------------------<  95  3.8   |  27  |  1.22    Ca    9.3      08 Jan 2024 06:55  Mg     1.6     01-08        Lipid Profile: Cholesterol 132  LDL --  HDL 47    Ldl calc 59  Ratio --    PT/INR - ( 08 Jan 2024 06:55 )   PT: 24.2 sec;   INR: 2.17 ratio         PTT - ( 08 Jan 2024 06:55 )  PTT:87.1 sec

## 2024-01-08 NOTE — PROGRESS NOTE ADULT - ASSESSMENT
77 y/o M with PMH of DM, HTN, HLD, PAF, CHF, S/P defibrillator,S/P CABG and AVR  who presented after an episode of syncope,acute lt sided rib fx,sinusitis,BEVERLY.  1.Interrogation of  AICD q3mo.  2.PAF,mech AVR-coumadin inr 2.5-3.5D/c  heparin drip.  3.Borderline troponins-Stress test -no ischemia.  4.CHF by history-b blocker.Entresto.  5.CAD-statin,lopressor.   79 y/o M with PMH of DM, HTN, HLD, PAF, CHF, S/P defibrillator,S/P CABG and AVR  who presented after an episode of syncope,acute lt sided rib fx,sinusitis,BEVERLY.  1.Interrogation of  AICD q3mo.  2.PAF,mech AVR-coumadin inr 2.5-3.5D/c  heparin drip.  3.Borderline troponins-Stress test -no ischemia.  4.CHF by history-b blocker.Entresto.  5.CAD-statin,lopressor.

## 2024-01-08 NOTE — PROGRESS NOTE ADULT - REASON FOR ADMISSION
Syncope
Syncope
Syncope,Rib fx
syncope
Syncope,Rib fx
syncope
syncope
NSTEMI
Fall
Syncope/Fall
Syncope
Fall
